# Patient Record
Sex: FEMALE | Race: WHITE | NOT HISPANIC OR LATINO | Employment: OTHER | URBAN - METROPOLITAN AREA
[De-identification: names, ages, dates, MRNs, and addresses within clinical notes are randomized per-mention and may not be internally consistent; named-entity substitution may affect disease eponyms.]

---

## 2019-10-14 LAB
BUPRENORPHINE GLUCURONIDE URINE: <5 NG/ML
BUPRENORPHINE URINE: <2 NG/ML
NALOXONE URINE: <100 NG/ML
NORBUPRENORPHINE GLUCURONIDE URINE: <5 NG/ML
NORBUPRENORPHINE, URINE: <2 NG/ML

## 2023-04-19 ENCOUNTER — OFFICE VISIT (OUTPATIENT)
Dept: PRIMARY CARE | Facility: CLINIC | Age: 73
End: 2023-04-19
Payer: MEDICARE

## 2023-04-19 VITALS
DIASTOLIC BLOOD PRESSURE: 58 MMHG | BODY MASS INDEX: 43.99 KG/M2 | WEIGHT: 233 LBS | HEIGHT: 61 IN | RESPIRATION RATE: 18 BRPM | OXYGEN SATURATION: 91 % | SYSTOLIC BLOOD PRESSURE: 118 MMHG | TEMPERATURE: 98.1 F | HEART RATE: 72 BPM

## 2023-04-19 DIAGNOSIS — J43.9 PULMONARY EMPHYSEMA, UNSPECIFIED EMPHYSEMA TYPE (MULTI): Primary | ICD-10-CM

## 2023-04-19 DIAGNOSIS — G47.00 INSOMNIA, UNSPECIFIED TYPE: ICD-10-CM

## 2023-04-19 DIAGNOSIS — S39.012A ACUTE MYOFASCIAL STRAIN OF LUMBAR REGION, INITIAL ENCOUNTER: ICD-10-CM

## 2023-04-19 PROBLEM — I25.84 CORONARY ARTERY CALCIFICATION: Status: ACTIVE | Noted: 2023-04-19

## 2023-04-19 PROBLEM — Z86.010 HISTORY OF COLON POLYPS: Status: ACTIVE | Noted: 2023-04-19

## 2023-04-19 PROBLEM — E11.40 DIABETIC NEUROPATHY (MULTI): Status: ACTIVE | Noted: 2023-04-19

## 2023-04-19 PROBLEM — C91.10 CLL (CHRONIC LYMPHOCYTIC LEUKEMIA) (MULTI): Status: ACTIVE | Noted: 2023-04-19

## 2023-04-19 PROBLEM — E66.01 MORBID OBESITY WITH BODY MASS INDEX (BMI) OF 40.0 OR HIGHER (MULTI): Status: ACTIVE | Noted: 2023-04-19

## 2023-04-19 PROBLEM — M53.9 MULTILEVEL DEGENERATIVE DISC DISEASE: Status: ACTIVE | Noted: 2023-04-19

## 2023-04-19 PROBLEM — R13.10 DYSPHAGIA: Status: ACTIVE | Noted: 2023-04-19

## 2023-04-19 PROBLEM — L29.9 PRURITUS: Status: ACTIVE | Noted: 2023-04-19

## 2023-04-19 PROBLEM — M79.602 PAIN OF LEFT UPPER EXTREMITY: Status: ACTIVE | Noted: 2023-04-19

## 2023-04-19 PROBLEM — M54.9 BACK PAIN: Status: ACTIVE | Noted: 2023-04-19

## 2023-04-19 PROBLEM — J44.9 COPD (CHRONIC OBSTRUCTIVE PULMONARY DISEASE) (MULTI): Status: ACTIVE | Noted: 2023-04-19

## 2023-04-19 PROBLEM — I51.9 LV DYSFUNCTION: Status: ACTIVE | Noted: 2023-04-19

## 2023-04-19 PROBLEM — M25.50 POLYARTHRALGIA: Status: ACTIVE | Noted: 2023-04-19

## 2023-04-19 PROBLEM — L98.9 LESION OF FINGER: Status: ACTIVE | Noted: 2023-04-19

## 2023-04-19 PROBLEM — R91.8 LUNG INFILTRATE: Status: ACTIVE | Noted: 2023-04-19

## 2023-04-19 PROBLEM — H40.219 ACUTE ANGLE-CLOSURE GLAUCOMA: Status: ACTIVE | Noted: 2023-04-19

## 2023-04-19 PROBLEM — F17.200 SMOKING: Status: ACTIVE | Noted: 2023-04-19

## 2023-04-19 PROBLEM — R07.81 RIB PAIN: Status: ACTIVE | Noted: 2023-04-19

## 2023-04-19 PROBLEM — M25.551 PAIN OF RIGHT HIP JOINT: Status: ACTIVE | Noted: 2023-04-19

## 2023-04-19 PROBLEM — M19.90 ARTHRITIS: Status: ACTIVE | Noted: 2023-04-19

## 2023-04-19 PROBLEM — K58.9 IRRITABLE BOWEL SYNDROME: Status: ACTIVE | Noted: 2023-04-19

## 2023-04-19 PROBLEM — J45.909 REACTIVE AIRWAY DISEASE (HHS-HCC): Status: ACTIVE | Noted: 2023-04-19

## 2023-04-19 PROBLEM — R10.9 ABDOMINAL PAIN: Status: ACTIVE | Noted: 2023-04-19

## 2023-04-19 PROBLEM — M05.9 RHEUMATOID ARTHRITIS WITH RHEUMATOID FACTOR (MULTI): Status: ACTIVE | Noted: 2023-04-19

## 2023-04-19 PROBLEM — M53.3 PAIN OF BOTH SACROILIAC JOINTS: Status: ACTIVE | Noted: 2023-04-19

## 2023-04-19 PROBLEM — R06.02 SOB (SHORTNESS OF BREATH): Status: ACTIVE | Noted: 2023-04-19

## 2023-04-19 PROBLEM — I10 BENIGN ESSENTIAL HYPERTENSION: Status: ACTIVE | Noted: 2023-04-19

## 2023-04-19 PROBLEM — I50.9 CHF (CONGESTIVE HEART FAILURE) (MULTI): Status: ACTIVE | Noted: 2023-04-19

## 2023-04-19 PROBLEM — M79.605 LEFT LEG PAIN: Status: ACTIVE | Noted: 2023-04-19

## 2023-04-19 PROBLEM — H10.30 CONJUNCTIVITIS, ACUTE: Status: ACTIVE | Noted: 2023-04-19

## 2023-04-19 PROBLEM — K64.9 HEMORRHOIDS: Status: ACTIVE | Noted: 2023-04-19

## 2023-04-19 PROBLEM — G89.29 CHRONIC NECK PAIN: Status: ACTIVE | Noted: 2023-04-19

## 2023-04-19 PROBLEM — R91.8 ABNORMAL CT LUNG SCREENING: Status: ACTIVE | Noted: 2023-04-19

## 2023-04-19 PROBLEM — K21.9 GERD WITHOUT ESOPHAGITIS: Status: ACTIVE | Noted: 2023-04-19

## 2023-04-19 PROBLEM — I25.10 CORONARY ARTERY CALCIFICATION: Status: ACTIVE | Noted: 2023-04-19

## 2023-04-19 PROBLEM — M17.0 OSTEOARTHRITIS OF BOTH KNEES: Status: ACTIVE | Noted: 2023-04-19

## 2023-04-19 PROBLEM — K63.5 COLON POLYPS: Status: ACTIVE | Noted: 2023-04-19

## 2023-04-19 PROBLEM — G25.81 RESTLESS LEGS SYNDROME: Status: ACTIVE | Noted: 2023-04-19

## 2023-04-19 PROBLEM — M54.2 CHRONIC NECK PAIN: Status: ACTIVE | Noted: 2023-04-19

## 2023-04-19 PROBLEM — M79.18 MYOFASCIAL PAIN SYNDROME: Status: ACTIVE | Noted: 2023-04-19

## 2023-04-19 PROBLEM — R91.8 LUNG NODULES: Status: ACTIVE | Noted: 2023-04-19

## 2023-04-19 PROBLEM — I48.91 ATRIAL FIBRILLATION BY ELECTROCARDIOGRAM (MULTI): Status: ACTIVE | Noted: 2023-04-19

## 2023-04-19 PROBLEM — R10.11 RUQ PAIN: Status: ACTIVE | Noted: 2023-04-19

## 2023-04-19 PROBLEM — R52 PAIN MANAGEMENT: Status: ACTIVE | Noted: 2023-04-19

## 2023-04-19 PROBLEM — I48.0 PAF (PAROXYSMAL ATRIAL FIBRILLATION) (MULTI): Status: ACTIVE | Noted: 2023-04-19

## 2023-04-19 PROBLEM — F32.A DEPRESSION: Status: ACTIVE | Noted: 2023-04-19

## 2023-04-19 PROBLEM — J02.9 PHARYNGITIS: Status: ACTIVE | Noted: 2023-04-19

## 2023-04-19 PROBLEM — R73.9 HYPERGLYCEMIA: Status: ACTIVE | Noted: 2023-04-19

## 2023-04-19 PROBLEM — K58.0 IRRITABLE BOWEL SYNDROME WITH DIARRHEA: Status: ACTIVE | Noted: 2023-04-19

## 2023-04-19 PROBLEM — G89.29 CHRONIC PAIN: Status: ACTIVE | Noted: 2023-04-19

## 2023-04-19 PROBLEM — M15.9 POLYARTICULAR OSTEOARTHRITIS: Status: ACTIVE | Noted: 2023-04-19

## 2023-04-19 PROBLEM — K86.81 EXOCRINE PANCREATIC INSUFFICIENCY (HHS-HCC): Status: ACTIVE | Noted: 2023-04-19

## 2023-04-19 PROBLEM — L23.9 ALLERGIC DERMATITIS: Status: ACTIVE | Noted: 2023-04-19

## 2023-04-19 PROBLEM — M16.11 OSTEOARTHRITIS OF RIGHT HIP: Status: ACTIVE | Noted: 2023-04-19

## 2023-04-19 PROBLEM — E78.5 HYPERLIPIDEMIA: Status: ACTIVE | Noted: 2023-04-19

## 2023-04-19 PROBLEM — H92.03 OTALGIA OF BOTH EARS: Status: ACTIVE | Noted: 2023-04-19

## 2023-04-19 PROBLEM — S16.1XXA CERVICAL MYOFASCIAL STRAIN: Status: ACTIVE | Noted: 2023-04-19

## 2023-04-19 PROBLEM — I50.32 CHRONIC DIASTOLIC HEART FAILURE (MULTI): Status: ACTIVE | Noted: 2023-04-19

## 2023-04-19 PROBLEM — K59.04 CHRONIC IDIOPATHIC CONSTIPATION: Status: ACTIVE | Noted: 2023-04-19

## 2023-04-19 PROBLEM — K04.7 DENTAL INFECTION: Status: ACTIVE | Noted: 2023-04-19

## 2023-04-19 PROBLEM — G47.30 BREATHING-RELATED SLEEP DISORDER: Status: ACTIVE | Noted: 2023-04-19

## 2023-04-19 PROBLEM — Z86.0100 HISTORY OF COLON POLYPS: Status: ACTIVE | Noted: 2023-04-19

## 2023-04-19 PROBLEM — M43.10 ACQUIRED SPONDYLOLISTHESIS: Status: ACTIVE | Noted: 2023-04-19

## 2023-04-19 PROBLEM — L98.9 CHANGING SKIN LESION: Status: ACTIVE | Noted: 2023-04-19

## 2023-04-19 PROBLEM — I48.3 TYPICAL ATRIAL FLUTTER (MULTI): Status: ACTIVE | Noted: 2023-04-19

## 2023-04-19 PROCEDURE — 1036F TOBACCO NON-USER: CPT | Performed by: FAMILY MEDICINE

## 2023-04-19 PROCEDURE — 3078F DIAST BP <80 MM HG: CPT | Performed by: FAMILY MEDICINE

## 2023-04-19 PROCEDURE — 1159F MED LIST DOCD IN RCRD: CPT | Performed by: FAMILY MEDICINE

## 2023-04-19 PROCEDURE — 99214 OFFICE O/P EST MOD 30 MIN: CPT | Performed by: FAMILY MEDICINE

## 2023-04-19 PROCEDURE — 3074F SYST BP LT 130 MM HG: CPT | Performed by: FAMILY MEDICINE

## 2023-04-19 RX ORDER — METHOTREXATE 2.5 MG/1
6 TABLET ORAL
COMMUNITY
Start: 2019-09-26

## 2023-04-19 RX ORDER — NALOXONE HYDROCHLORIDE 4 MG/.1ML
4 SPRAY NASAL AS NEEDED
COMMUNITY
Start: 2019-02-14 | End: 2024-05-03 | Stop reason: WASHOUT

## 2023-04-19 RX ORDER — GUAIFENESIN 600 MG/1
1200 TABLET, EXTENDED RELEASE ORAL DAILY
COMMUNITY
End: 2024-02-19 | Stop reason: ALTCHOICE

## 2023-04-19 RX ORDER — FOLIC ACID 1 MG/1
1 TABLET ORAL DAILY
COMMUNITY
Start: 2019-09-26

## 2023-04-19 RX ORDER — PREDNISONE 20 MG/1
TABLET ORAL
Qty: 18 TABLET | Refills: 0 | Status: SHIPPED | OUTPATIENT
Start: 2023-04-19 | End: 2023-06-27 | Stop reason: ALTCHOICE

## 2023-04-19 RX ORDER — MONTELUKAST SODIUM 10 MG/1
1 TABLET ORAL DAILY
COMMUNITY
Start: 2016-11-07 | End: 2024-01-30 | Stop reason: ALTCHOICE

## 2023-04-19 RX ORDER — ROFLUMILAST 500 UG/1
1 TABLET ORAL DAILY
COMMUNITY
Start: 2018-05-22 | End: 2023-06-26 | Stop reason: SDUPTHER

## 2023-04-19 RX ORDER — PROPAFENONE HYDROCHLORIDE 225 MG/1
225 TABLET, COATED ORAL EVERY 8 HOURS
COMMUNITY
Start: 2020-02-17 | End: 2024-02-27 | Stop reason: SDUPTHER

## 2023-04-19 RX ORDER — CLONIDINE HYDROCHLORIDE 0.1 MG/1
1 TABLET ORAL EVERY 8 HOURS
COMMUNITY
Start: 2016-06-20 | End: 2023-06-26 | Stop reason: SDUPTHER

## 2023-04-19 RX ORDER — HYDROXYZINE HYDROCHLORIDE 50 MG/1
50-100 TABLET, FILM COATED ORAL 2 TIMES DAILY PRN
Qty: 30 TABLET | Refills: 0 | Status: SHIPPED | OUTPATIENT
Start: 2023-04-19 | End: 2023-05-24

## 2023-04-19 RX ORDER — BUDESONIDE, GLYCOPYRROLATE, AND FORMOTEROL FUMARATE 160; 9; 4.8 UG/1; UG/1; UG/1
2 AEROSOL, METERED RESPIRATORY (INHALATION) 2 TIMES DAILY
COMMUNITY
Start: 2023-02-17 | End: 2024-01-30 | Stop reason: ALTCHOICE

## 2023-04-19 RX ORDER — SULFASALAZINE 500 MG/1
500 TABLET ORAL 2 TIMES DAILY
COMMUNITY
End: 2024-01-22 | Stop reason: SDUPTHER

## 2023-04-19 RX ORDER — ATORVASTATIN CALCIUM 20 MG/1
1 TABLET, FILM COATED ORAL NIGHTLY
COMMUNITY
Start: 2022-09-15 | End: 2024-02-06 | Stop reason: SDUPTHER

## 2023-04-19 RX ORDER — LINACLOTIDE 72 UG/1
1 CAPSULE, GELATIN COATED ORAL DAILY
COMMUNITY
Start: 2022-03-09

## 2023-04-19 RX ORDER — TAPENTADOL HYDROCHLORIDE 75 MG/1
75 TABLET, FILM COATED ORAL
COMMUNITY
Start: 2023-04-11 | End: 2023-06-27

## 2023-04-19 RX ORDER — CREAM BASE NO.31
1 CREAM (GRAM) MISCELLANEOUS SEE ADMIN INSTRUCTIONS
COMMUNITY
End: 2024-01-30 | Stop reason: ALTCHOICE

## 2023-04-19 RX ORDER — ALBUTEROL SULFATE 90 UG/1
2 AEROSOL, METERED RESPIRATORY (INHALATION)
COMMUNITY
Start: 2016-09-05

## 2023-04-19 RX ORDER — GABAPENTIN 300 MG/1
1 CAPSULE ORAL 3 TIMES DAILY
COMMUNITY
End: 2023-11-13 | Stop reason: SDUPTHER

## 2023-04-19 RX ORDER — LORATADINE 10 MG/1
CAPSULE, LIQUID FILLED ORAL
COMMUNITY
End: 2023-12-11 | Stop reason: WASHOUT

## 2023-04-19 RX ORDER — FAMOTIDINE 40 MG/1
40 TABLET, FILM COATED ORAL DAILY
COMMUNITY
End: 2023-06-19

## 2023-04-19 RX ORDER — CIPROFLOXACIN HYDROCHLORIDE 3 MG/ML
SOLUTION/ DROPS OPHTHALMIC
COMMUNITY
Start: 2022-04-25 | End: 2023-06-27 | Stop reason: ALTCHOICE

## 2023-04-19 RX ORDER — DEXLANSOPRAZOLE 60 MG/1
20 CAPSULE, DELAYED RELEASE ORAL
COMMUNITY
Start: 2022-04-28 | End: 2023-05-01 | Stop reason: SDUPTHER

## 2023-04-19 RX ORDER — IPRATROPIUM BROMIDE AND ALBUTEROL SULFATE 2.5; .5 MG/3ML; MG/3ML
3 SOLUTION RESPIRATORY (INHALATION) EVERY 4 HOURS PRN
COMMUNITY
Start: 2019-12-12 | End: 2024-02-06 | Stop reason: WASHOUT

## 2023-04-19 RX ORDER — MUPIROCIN 20 MG/G
OINTMENT TOPICAL
COMMUNITY
Start: 2022-06-22 | End: 2024-02-19 | Stop reason: ALTCHOICE

## 2023-04-19 ASSESSMENT — PATIENT HEALTH QUESTIONNAIRE - PHQ9
2. FEELING DOWN, DEPRESSED OR HOPELESS: NOT AT ALL
1. LITTLE INTEREST OR PLEASURE IN DOING THINGS: NOT AT ALL
SUM OF ALL RESPONSES TO PHQ9 QUESTIONS 1 AND 2: 0

## 2023-04-19 ASSESSMENT — ENCOUNTER SYMPTOMS
LOSS OF SENSATION IN FEET: 1
OCCASIONAL FEELINGS OF UNSTEADINESS: 1
DEPRESSION: 0

## 2023-04-19 NOTE — PROGRESS NOTES
"Subjective   Patient ID: Alysia Magdaleno is a 72 y.o. female who presents for Spasms (Back muscle spasm last week ), Insomnia, and Shortness of Breath.    Difficulty breathing- following with Dr. Solano for pulmonary nodules with PET scan completed 2/14 with findings suggested of low grade neoplasm vs inflammatory/infectious processes, has a CT ordered for follow up 5/2023.    Has difficulty sleeping, has been using OTC sleep aid. Uses oxygen 2 L at night only.    Back pain that started last week after doing a lot of lifting activities is improving but still having pain with increased activity         Review of Systems   All other systems reviewed and are negative.      Objective   /58   Pulse 72   Temp 36.7 °C (98.1 °F)   Resp 18   Ht 1.549 m (5' 1\")   Wt 106 kg (233 lb)   SpO2 91%   BMI 44.02 kg/m²     Physical Exam  Constitutional:       Appearance: Normal appearance. She is morbidly obese.   HENT:      Head: Normocephalic and atraumatic.   Cardiovascular:      Rate and Rhythm: Normal rate and regular rhythm.      Heart sounds: No murmur heard.     No gallop.   Pulmonary:      Effort: Pulmonary effort is normal. No respiratory distress.      Breath sounds: Normal breath sounds.   Abdominal:      General: Bowel sounds are normal. There is no distension.      Tenderness: There is no abdominal tenderness.   Musculoskeletal:      Lumbar back: Tenderness present. Decreased range of motion. Negative right straight leg raise test and negative left straight leg raise test.   Skin:     General: Skin is warm and dry.      Findings: No lesion or rash.   Neurological:      General: No focal deficit present.      Mental Status: She is alert and oriented to person, place, and time. Mental status is at baseline.   Psychiatric:         Mood and Affect: Mood normal.         Behavior: Behavior normal.         Assessment/Plan   Problem List Items Addressed This Visit          Nervous    Insomnia    Relevant Medications "    hydrOXYzine HCL (Atarax) 50 mg tablet       Respiratory    COPD (chronic obstructive pulmonary disease) (CMS/HCC) - Primary    Relevant Medications    predniSONE (Deltasone) 20 mg tablet     Other Visit Diagnoses       Acute myofascial strain of lumbar region, initial encounter

## 2023-05-01 DIAGNOSIS — K21.9 GERD WITHOUT ESOPHAGITIS: Primary | ICD-10-CM

## 2023-05-01 RX ORDER — DEXLANSOPRAZOLE 60 MG/1
60 CAPSULE, DELAYED RELEASE ORAL
Qty: 90 CAPSULE | Refills: 1 | Status: SHIPPED | OUTPATIENT
Start: 2023-05-01 | End: 2023-08-09 | Stop reason: SDUPTHER

## 2023-05-24 ENCOUNTER — OFFICE VISIT (OUTPATIENT)
Dept: PRIMARY CARE | Facility: CLINIC | Age: 73
End: 2023-05-24
Payer: MEDICARE

## 2023-05-24 VITALS
BODY MASS INDEX: 43.99 KG/M2 | HEART RATE: 74 BPM | OXYGEN SATURATION: 91 % | RESPIRATION RATE: 18 BRPM | HEIGHT: 61 IN | SYSTOLIC BLOOD PRESSURE: 120 MMHG | WEIGHT: 233 LBS | TEMPERATURE: 98.1 F | DIASTOLIC BLOOD PRESSURE: 80 MMHG

## 2023-05-24 DIAGNOSIS — R13.10 DYSPHAGIA, UNSPECIFIED: ICD-10-CM

## 2023-05-24 DIAGNOSIS — G47.00 INSOMNIA, UNSPECIFIED TYPE: ICD-10-CM

## 2023-05-24 DIAGNOSIS — R13.10 DYSPHAGIA, UNSPECIFIED TYPE: Primary | ICD-10-CM

## 2023-05-24 PROCEDURE — 3074F SYST BP LT 130 MM HG: CPT | Performed by: FAMILY MEDICINE

## 2023-05-24 PROCEDURE — 99214 OFFICE O/P EST MOD 30 MIN: CPT | Performed by: FAMILY MEDICINE

## 2023-05-24 PROCEDURE — 1160F RVW MEDS BY RX/DR IN RCRD: CPT | Performed by: FAMILY MEDICINE

## 2023-05-24 PROCEDURE — 1036F TOBACCO NON-USER: CPT | Performed by: FAMILY MEDICINE

## 2023-05-24 PROCEDURE — 3079F DIAST BP 80-89 MM HG: CPT | Performed by: FAMILY MEDICINE

## 2023-05-24 PROCEDURE — 1159F MED LIST DOCD IN RCRD: CPT | Performed by: FAMILY MEDICINE

## 2023-05-24 RX ORDER — TRAZODONE HYDROCHLORIDE 50 MG/1
50 TABLET ORAL NIGHTLY PRN
Qty: 30 TABLET | Refills: 0 | Status: SHIPPED | OUTPATIENT
Start: 2023-05-24 | End: 2023-06-21 | Stop reason: SDUPTHER

## 2023-05-24 ASSESSMENT — ENCOUNTER SYMPTOMS
DEPRESSION: 0
LOSS OF SENSATION IN FEET: 0
OCCASIONAL FEELINGS OF UNSTEADINESS: 0

## 2023-05-24 ASSESSMENT — PATIENT HEALTH QUESTIONNAIRE - PHQ9
SUM OF ALL RESPONSES TO PHQ9 QUESTIONS 1 AND 2: 0
1. LITTLE INTEREST OR PLEASURE IN DOING THINGS: NOT AT ALL
2. FEELING DOWN, DEPRESSED OR HOPELESS: NOT AT ALL

## 2023-05-24 NOTE — PROGRESS NOTES
"Subjective   Patient ID: Alysia Magdaleno is a 72 y.o. female who presents for Follow-up (Patient present today for complaint of trouble swallowing. ).    Presents with multiple concerns    Has been having trouble swallowing, reports some coughing after eating and drinking. Denies fever, chills, chest pain.    Has been having difficulty sleeping, has taken OTC sleep aides that help some. Declines sleep study today. Had tried hydroxyzine 100 mg which did not help any more then the OTC medications.          Review of Systems   All other systems reviewed and are negative.      Objective   /80   Pulse 74   Temp 36.7 °C (98.1 °F)   Resp 18   Ht 1.549 m (5' 1\")   Wt 106 kg (233 lb)   SpO2 91%   BMI 44.02 kg/m²     Physical Exam  Constitutional:       Appearance: Normal appearance.   HENT:      Head: Normocephalic and atraumatic.   Neck:      Thyroid: No thyroid mass, thyromegaly or thyroid tenderness.      Trachea: Trachea normal.   Cardiovascular:      Rate and Rhythm: Normal rate and regular rhythm.      Heart sounds: No murmur heard.     No gallop.   Pulmonary:      Effort: Pulmonary effort is normal. No respiratory distress.      Breath sounds: Normal breath sounds.   Abdominal:      General: Bowel sounds are normal. There is no distension.      Tenderness: There is no abdominal tenderness.   Musculoskeletal:         General: Normal range of motion.      Cervical back: Full passive range of motion without pain.   Skin:     General: Skin is warm and dry.      Findings: No lesion or rash.   Neurological:      General: No focal deficit present.      Mental Status: She is alert and oriented to person, place, and time. Mental status is at baseline.   Psychiatric:         Mood and Affect: Mood normal.         Behavior: Behavior normal.         Assessment/Plan   Problem List Items Addressed This Visit          Nervous    Insomnia    Relevant Medications    traZODone (Desyrel) 50 mg tablet       Digestive    " Dysphagia - Primary    Relevant Orders    FL esophagus barium swallow

## 2023-06-05 ENCOUNTER — TELEPHONE (OUTPATIENT)
Dept: PRIMARY CARE | Facility: CLINIC | Age: 73
End: 2023-06-05
Payer: MEDICARE

## 2023-06-15 ENCOUNTER — OFFICE VISIT (OUTPATIENT)
Dept: PRIMARY CARE | Facility: CLINIC | Age: 73
End: 2023-06-15
Payer: MEDICARE

## 2023-06-15 VITALS
BODY MASS INDEX: 44.12 KG/M2 | SYSTOLIC BLOOD PRESSURE: 138 MMHG | DIASTOLIC BLOOD PRESSURE: 82 MMHG | WEIGHT: 233.5 LBS | HEART RATE: 78 BPM

## 2023-06-15 DIAGNOSIS — Z13.220 LIPID SCREENING: ICD-10-CM

## 2023-06-15 DIAGNOSIS — R06.02 SHORTNESS OF BREATH: ICD-10-CM

## 2023-06-15 DIAGNOSIS — D50.9 IRON DEFICIENCY ANEMIA, UNSPECIFIED IRON DEFICIENCY ANEMIA TYPE: ICD-10-CM

## 2023-06-15 DIAGNOSIS — L03.116 CELLULITIS OF LEFT LOWER EXTREMITY: ICD-10-CM

## 2023-06-15 DIAGNOSIS — R06.02 SOB (SHORTNESS OF BREATH): ICD-10-CM

## 2023-06-15 DIAGNOSIS — R60.0 PEDAL EDEMA: Primary | ICD-10-CM

## 2023-06-15 PROCEDURE — 99214 OFFICE O/P EST MOD 30 MIN: CPT | Performed by: FAMILY MEDICINE

## 2023-06-15 PROCEDURE — 3079F DIAST BP 80-89 MM HG: CPT | Performed by: FAMILY MEDICINE

## 2023-06-15 PROCEDURE — 1159F MED LIST DOCD IN RCRD: CPT | Performed by: FAMILY MEDICINE

## 2023-06-15 PROCEDURE — 1160F RVW MEDS BY RX/DR IN RCRD: CPT | Performed by: FAMILY MEDICINE

## 2023-06-15 PROCEDURE — 3075F SYST BP GE 130 - 139MM HG: CPT | Performed by: FAMILY MEDICINE

## 2023-06-15 PROCEDURE — 1036F TOBACCO NON-USER: CPT | Performed by: FAMILY MEDICINE

## 2023-06-15 RX ORDER — FUROSEMIDE 20 MG/1
20 TABLET ORAL DAILY
Qty: 7 TABLET | Refills: 0 | Status: SHIPPED | OUTPATIENT
Start: 2023-06-15 | End: 2023-06-27 | Stop reason: ALTCHOICE

## 2023-06-15 RX ORDER — CEPHALEXIN 250 MG/1
250 CAPSULE ORAL 4 TIMES DAILY
Qty: 40 CAPSULE | Refills: 0 | Status: SHIPPED | OUTPATIENT
Start: 2023-06-15 | End: 2023-06-27 | Stop reason: ALTCHOICE

## 2023-06-15 ASSESSMENT — ENCOUNTER SYMPTOMS
NAUSEA: 0
HEMATOLOGIC/LYMPHATIC NEGATIVE: 1
EYES NEGATIVE: 1
NEUROLOGICAL NEGATIVE: 1
SHORTNESS OF BREATH: 1
DIAPHORESIS: 0
ENDOCRINE NEGATIVE: 1
VOMITING: 0
PALPITATIONS: 0
COLOR CHANGE: 1
ALLERGIC/IMMUNOLOGIC NEGATIVE: 1
MUSCULOSKELETAL NEGATIVE: 1
GASTROINTESTINAL NEGATIVE: 1
PSYCHIATRIC NEGATIVE: 1
ABDOMINAL PAIN: 0
CHILLS: 0
ACTIVITY CHANGE: 1
FATIGUE: 0
FEVER: 0
APPETITE CHANGE: 0
UNEXPECTED WEIGHT CHANGE: 0

## 2023-06-15 NOTE — PROGRESS NOTES
Subjective   Patient ID: Alysia Magdaleno is a 72 y.o. female who presents for Leg Swelling (Right leg redness ).    Patient presents for bilateral leg swelling and left leg redness that started 2 weeks ago with increased shortness of breath on exertion. She denies eating any added salt. She said her leg swelling has made it difficult for her to walk far. She said that she was on a diuretic at one time. She says that she cannot wear compression socks because they hurt and she does elevate her feet when she is able to. She follows with  Dr. Livingston for a fib and will see on 6/21. Also follows with Dr. Rose for CHF and was last seen 4/11/23. Is also following with pulmonology for suspicious lung nodule and is awaiting biopsy.     Nanette Cm P student acting as scribe for Sadie Rao CNP           Review of Systems   Constitutional:  Positive for activity change. Negative for appetite change, chills, diaphoresis, fatigue, fever and unexpected weight change.   HENT: Negative.     Eyes: Negative.    Respiratory:  Positive for shortness of breath.    Cardiovascular:  Positive for leg swelling. Negative for chest pain and palpitations.   Gastrointestinal: Negative.  Negative for abdominal pain, nausea and vomiting.   Endocrine: Negative.    Genitourinary: Negative.    Musculoskeletal: Negative.    Skin:  Positive for color change.   Allergic/Immunologic: Negative.    Neurological: Negative.    Hematological: Negative.    Psychiatric/Behavioral: Negative.         Objective   /82   Pulse 78   Wt 106 kg (233 lb 8 oz)   BMI 44.12 kg/m²     Physical Exam  Constitutional:       Appearance: She is obese.   HENT:      Head: Normocephalic and atraumatic.      Nose: Nose normal.   Cardiovascular:      Rate and Rhythm: Normal rate and regular rhythm.      Pulses: Normal pulses.      Heart sounds: Normal heart sounds.   Pulmonary:      Effort: Accessory muscle usage present.      Breath sounds: Normal  breath sounds.   Abdominal:      General: Bowel sounds are normal.   Musculoskeletal:         General: Tenderness present.      Cervical back: Normal range of motion and neck supple.      Right lower leg: Edema present.      Left lower leg: Edema present.   Skin:     Capillary Refill: Capillary refill takes less than 2 seconds.      Findings: Erythema present.      Comments: Left leg   Neurological:      General: No focal deficit present.      Mental Status: She is alert and oriented to person, place, and time.   Psychiatric:         Mood and Affect: Mood normal.         Behavior: Behavior normal.         Thought Content: Thought content normal.         Judgment: Judgment normal.         Assessment/Plan   Problem List Items Addressed This Visit       SOB (shortness of breath)    Relevant Orders    Comprehensive Metabolic Panel     Other Visit Diagnoses       Pedal edema    -  Primary    Relevant Medications    furosemide (Lasix) 20 mg tablet    Other Relevant Orders    B-type natriuretic peptide    TSH with reflex to Free T4 if abnormal    Shortness of breath        Relevant Orders    B-type natriuretic peptide    Lipid screening        Relevant Orders    Lipid Panel    Iron deficiency anemia, unspecified iron deficiency anemia type        Relevant Orders    CBC and Auto Differential    Iron and TIBC    Vitamin B12    Cellulitis of left lower extremity        Relevant Medications    cephalexin (Keflex) 250 mg capsule

## 2023-06-15 NOTE — PATIENT INSTRUCTIONS
I ordering lab work that I would like you to get done at your earliest convenience.    I am starting you on an antibiotic for cellulitis and a water pill daily for 7 days to help with the swelling in your legs.     Make sure to elevate your legs above the level of your heart for 20 minutes 3-4 times a day. I recommend using compression stockings applying first thing in the morning and taking off before bed. I want you to call me with an update in 1 week or sooner with any worsening symptoms.

## 2023-06-19 ENCOUNTER — TELEPHONE (OUTPATIENT)
Dept: PRIMARY CARE | Facility: CLINIC | Age: 73
End: 2023-06-19

## 2023-06-19 ENCOUNTER — LAB (OUTPATIENT)
Dept: LAB | Facility: LAB | Age: 73
End: 2023-06-19
Payer: MEDICARE

## 2023-06-19 DIAGNOSIS — R60.0 PEDAL EDEMA: ICD-10-CM

## 2023-06-19 DIAGNOSIS — I99.8 VASCULAR INSUFFICIENCY: Primary | ICD-10-CM

## 2023-06-19 DIAGNOSIS — R06.02 SHORTNESS OF BREATH: ICD-10-CM

## 2023-06-19 DIAGNOSIS — Z13.220 LIPID SCREENING: ICD-10-CM

## 2023-06-19 DIAGNOSIS — R06.02 SOB (SHORTNESS OF BREATH): ICD-10-CM

## 2023-06-19 DIAGNOSIS — D50.9 IRON DEFICIENCY ANEMIA, UNSPECIFIED IRON DEFICIENCY ANEMIA TYPE: ICD-10-CM

## 2023-06-19 DIAGNOSIS — D64.9 ANEMIA, UNSPECIFIED TYPE: ICD-10-CM

## 2023-06-19 LAB
ALANINE AMINOTRANSFERASE (SGPT) (U/L) IN SER/PLAS: 11 U/L (ref 7–45)
ALBUMIN (G/DL) IN SER/PLAS: 4.1 G/DL (ref 3.4–5)
ALKALINE PHOSPHATASE (U/L) IN SER/PLAS: 71 U/L (ref 33–136)
ANION GAP IN SER/PLAS: 12 MMOL/L (ref 10–20)
ASPARTATE AMINOTRANSFERASE (SGOT) (U/L) IN SER/PLAS: 14 U/L (ref 9–39)
BASOPHILS (10*3/UL) IN BLOOD BY MANUAL COUNT - WAM: 0 X10E9/L (ref 0–0.1)
BASOPHILS/100 LEUKOCYTES IN BLOOD BY MANUAL COUNT - WAM: 0 %
BILIRUBIN TOTAL (MG/DL) IN SER/PLAS: 0.3 MG/DL (ref 0–1.2)
CALCIUM (MG/DL) IN SER/PLAS: 9.4 MG/DL (ref 8.6–10.3)
CARBON DIOXIDE, TOTAL (MMOL/L) IN SER/PLAS: 31 MMOL/L (ref 21–32)
CHLORIDE (MMOL/L) IN SER/PLAS: 99 MMOL/L (ref 98–107)
CHOLESTEROL (MG/DL) IN SER/PLAS: 151 MG/DL (ref 0–199)
CHOLESTEROL IN HDL (MG/DL) IN SER/PLAS: 71.7 MG/DL
CHOLESTEROL/HDL RATIO: 2.1
COBALAMIN (VITAMIN B12) (PG/ML) IN SER/PLAS: 499 PG/ML (ref 211–911)
CREATININE (MG/DL) IN SER/PLAS: 0.95 MG/DL (ref 0.5–1.05)
EOSINOPHILS (10*3/UL) IN BLOOD BY MANUAL COUNT - WAM: 0.53 X10E9/L (ref 0–0.7)
EOSINOPHILS/100 LEUKOCYTES IN BLOOD BY MANUAL COUNT - WAM: 2 % (ref 0–6)
ERYTHROCYTE DISTRIBUTION WIDTH (RATIO) BY AUTOMATED COUNT: 17 % (ref 11.5–14.5)
ERYTHROCYTE MEAN CORPUSCULAR HEMOGLOBIN CONCENTRATION (G/DL) BY AUTOMATED: 28.8 G/DL (ref 32–36)
ERYTHROCYTE MEAN CORPUSCULAR VOLUME (FL) BY AUTOMATED COUNT: 101 FL (ref 80–100)
ERYTHROCYTES (10*6/UL) IN BLOOD BY AUTOMATED COUNT: 3.59 X10E12/L (ref 4–5.2)
GFR FEMALE: 64 ML/MIN/1.73M2
GLUCOSE (MG/DL) IN SER/PLAS: 136 MG/DL (ref 74–99)
HEMATOCRIT (%) IN BLOOD BY AUTOMATED COUNT: 36.4 % (ref 36–46)
HEMOGLOBIN (G/DL) IN BLOOD: 10.5 G/DL (ref 12–16)
IMMATURE GRANULOCYTES/100 LEUKOCYTES IN BLOOD BY AUTOMATED COUNT: 0.2 % (ref 0–0.9)
IRON (UG/DL) IN SER/PLAS: 38 UG/DL (ref 35–150)
IRON BINDING CAPACITY (UG/DL) IN SER/PLAS: 444 UG/DL (ref 240–445)
IRON SATURATION (%) IN SER/PLAS: 9 % (ref 25–45)
LDL: 57 MG/DL (ref 0–99)
LEUKOCYTES (10*3/UL) IN BLOOD BY AUTOMATED COUNT: 26.5 X10E9/L (ref 4.4–11.3)
LYMPHOCYTES (10*3/UL) IN BLOOD BY MANUAL COUNT - WAM: 23.06 X10E9/L (ref 1.2–4.8)
LYMPHOCYTES/100 LEUKOCYTES IN BLOOD BY MANUAL COUNT - WAM: 87 % (ref 13–44)
MANUAL DIFFERENTIAL Y/N: ABNORMAL
MONOCYTES (10*3/UL) IN BLOOD BY MANUAL COUNT - WAM: 0 X10E9/L (ref 0.1–1)
MONOCYTES/100 LEUKOCYTES IN BLOOD BY MANUAL COUNT - WAM: 0 % (ref 2–10)
NATRIURETIC PEPTIDE B (PG/ML) IN SER/PLAS: 66 PG/ML (ref 0–99)
NEUTROPHILS (SEGS+BANDS) (10*3/UL) MANUAL COUNT - WAM: 2.92 X10E9/L (ref 1.2–7.7)
PLATELETS (10*3/UL) IN BLOOD AUTOMATED COUNT: 275 X10E9/L (ref 150–450)
POTASSIUM (MMOL/L) IN SER/PLAS: 4.2 MMOL/L (ref 3.5–5.3)
PROTEIN TOTAL: 6.5 G/DL (ref 6.4–8.2)
RBC MORPHOLOGY IN BLOOD: NORMAL
SEGMENTED NEUTROPHILS (10*3/UL) BLOOD MANUAL - WAM: 2.92 X10E9/L (ref 1.2–7)
SEGMENTED NEUTROPHILS/100 LEUKOCYTES BY MANUAL COUNT -: 11 % (ref 40–80)
SODIUM (MMOL/L) IN SER/PLAS: 138 MMOL/L (ref 136–145)
THYROTROPIN (MIU/L) IN SER/PLAS BY DETECTION LIMIT <= 0.05 MIU/L: 5.14 MIU/L (ref 0.44–3.98)
THYROXINE (T4) FREE (NG/DL) IN SER/PLAS: 0.9 NG/DL (ref 0.61–1.12)
TRIGLYCERIDE (MG/DL) IN SER/PLAS: 113 MG/DL (ref 0–149)
UREA NITROGEN (MG/DL) IN SER/PLAS: 14 MG/DL (ref 6–23)
VLDL: 23 MG/DL (ref 0–40)

## 2023-06-19 PROCEDURE — 83880 ASSAY OF NATRIURETIC PEPTIDE: CPT

## 2023-06-19 PROCEDURE — 36415 COLL VENOUS BLD VENIPUNCTURE: CPT

## 2023-06-19 PROCEDURE — 84443 ASSAY THYROID STIM HORMONE: CPT

## 2023-06-19 PROCEDURE — 83550 IRON BINDING TEST: CPT

## 2023-06-19 PROCEDURE — 80061 LIPID PANEL: CPT

## 2023-06-19 PROCEDURE — 84439 ASSAY OF FREE THYROXINE: CPT

## 2023-06-19 PROCEDURE — 82607 VITAMIN B-12: CPT

## 2023-06-19 PROCEDURE — 83540 ASSAY OF IRON: CPT

## 2023-06-19 PROCEDURE — 80053 COMPREHEN METABOLIC PANEL: CPT

## 2023-06-19 PROCEDURE — 85025 COMPLETE CBC W/AUTO DIFF WBC: CPT

## 2023-06-20 NOTE — RESULT ENCOUNTER NOTE
Anemia a bit worse, ordering fecal occult to rule out GI source of bleeding. BNP normals level, will order ultrasound of legs for vascular etiology of pedal edema

## 2023-06-21 DIAGNOSIS — G47.00 INSOMNIA, UNSPECIFIED TYPE: ICD-10-CM

## 2023-06-21 RX ORDER — TRAZODONE HYDROCHLORIDE 50 MG/1
50 TABLET ORAL NIGHTLY PRN
Qty: 90 TABLET | Refills: 3 | Status: SHIPPED | OUTPATIENT
Start: 2023-06-21 | End: 2024-06-03

## 2023-06-26 ENCOUNTER — TELEPHONE (OUTPATIENT)
Dept: PRIMARY CARE | Facility: CLINIC | Age: 73
End: 2023-06-26
Payer: MEDICARE

## 2023-06-26 DIAGNOSIS — J44.9 CHRONIC OBSTRUCTIVE PULMONARY DISEASE, UNSPECIFIED COPD TYPE (MULTI): ICD-10-CM

## 2023-06-26 DIAGNOSIS — I50.32 CHRONIC DIASTOLIC HEART FAILURE (MULTI): Primary | ICD-10-CM

## 2023-06-26 DIAGNOSIS — I10 BENIGN ESSENTIAL HYPERTENSION: ICD-10-CM

## 2023-06-26 RX ORDER — ROFLUMILAST 500 UG/1
500 TABLET ORAL DAILY
Qty: 90 TABLET | Refills: 3 | Status: SHIPPED | OUTPATIENT
Start: 2023-06-26 | End: 2023-12-11 | Stop reason: SDUPTHER

## 2023-06-26 RX ORDER — CLONIDINE HYDROCHLORIDE 0.1 MG/1
0.1 TABLET ORAL EVERY 8 HOURS
Qty: 270 TABLET | Refills: 3 | Status: SHIPPED | OUTPATIENT
Start: 2023-06-26 | End: 2024-06-25

## 2023-06-27 ENCOUNTER — OFFICE VISIT (OUTPATIENT)
Dept: PRIMARY CARE | Facility: CLINIC | Age: 73
End: 2023-06-27
Payer: MEDICARE

## 2023-06-27 ENCOUNTER — TELEPHONE (OUTPATIENT)
Dept: PRIMARY CARE | Facility: CLINIC | Age: 73
End: 2023-06-27

## 2023-06-27 VITALS
HEART RATE: 74 BPM | HEIGHT: 60 IN | DIASTOLIC BLOOD PRESSURE: 60 MMHG | BODY MASS INDEX: 44.76 KG/M2 | TEMPERATURE: 98 F | RESPIRATION RATE: 20 BRPM | WEIGHT: 228 LBS | SYSTOLIC BLOOD PRESSURE: 124 MMHG | OXYGEN SATURATION: 92 %

## 2023-06-27 DIAGNOSIS — E66.01 MORBID OBESITY WITH BODY MASS INDEX (BMI) OF 40.0 OR HIGHER (MULTI): ICD-10-CM

## 2023-06-27 DIAGNOSIS — F33.9 DEPRESSION, RECURRENT (CMS-HCC): ICD-10-CM

## 2023-06-27 DIAGNOSIS — E78.2 MIXED HYPERLIPIDEMIA: ICD-10-CM

## 2023-06-27 DIAGNOSIS — R91.8 ABNORMAL CT LUNG SCREENING: ICD-10-CM

## 2023-06-27 DIAGNOSIS — C91.10 CLL (CHRONIC LYMPHOCYTIC LEUKEMIA) (MULTI): ICD-10-CM

## 2023-06-27 DIAGNOSIS — I50.9 CONGESTIVE HEART FAILURE, UNSPECIFIED HF CHRONICITY, UNSPECIFIED HEART FAILURE TYPE (MULTI): ICD-10-CM

## 2023-06-27 DIAGNOSIS — M05.9 RHEUMATOID ARTHRITIS WITH POSITIVE RHEUMATOID FACTOR, INVOLVING UNSPECIFIED SITE (MULTI): ICD-10-CM

## 2023-06-27 DIAGNOSIS — L81.9 DISCOLORATION OF SKIN OF FOOT: ICD-10-CM

## 2023-06-27 DIAGNOSIS — R09.89 DECREASED PULSES IN FEET: ICD-10-CM

## 2023-06-27 DIAGNOSIS — R60.0 BILATERAL LOWER EXTREMITY EDEMA: ICD-10-CM

## 2023-06-27 DIAGNOSIS — R73.9 HYPERGLYCEMIA: ICD-10-CM

## 2023-06-27 DIAGNOSIS — I48.91 ATRIAL FIBRILLATION BY ELECTROCARDIOGRAM (MULTI): Primary | ICD-10-CM

## 2023-06-27 PROBLEM — E11.40 DIABETIC NEUROPATHY (MULTI): Status: RESOLVED | Noted: 2023-04-19 | Resolved: 2023-06-27

## 2023-06-27 PROBLEM — E03.9 ACQUIRED HYPOTHYROIDISM: Status: ACTIVE | Noted: 2023-06-27

## 2023-06-27 LAB — POC HEMOGLOBIN A1C: 7.4 % (ref 4.2–6.5)

## 2023-06-27 PROCEDURE — 3078F DIAST BP <80 MM HG: CPT | Performed by: FAMILY MEDICINE

## 2023-06-27 PROCEDURE — 1036F TOBACCO NON-USER: CPT | Performed by: FAMILY MEDICINE

## 2023-06-27 PROCEDURE — 3074F SYST BP LT 130 MM HG: CPT | Performed by: FAMILY MEDICINE

## 2023-06-27 PROCEDURE — 1159F MED LIST DOCD IN RCRD: CPT | Performed by: FAMILY MEDICINE

## 2023-06-27 PROCEDURE — 99214 OFFICE O/P EST MOD 30 MIN: CPT | Performed by: FAMILY MEDICINE

## 2023-06-27 PROCEDURE — 1160F RVW MEDS BY RX/DR IN RCRD: CPT | Performed by: FAMILY MEDICINE

## 2023-06-27 PROCEDURE — 83036 HEMOGLOBIN GLYCOSYLATED A1C: CPT | Performed by: FAMILY MEDICINE

## 2023-06-27 ASSESSMENT — PATIENT HEALTH QUESTIONNAIRE - PHQ9
5. POOR APPETITE OR OVEREATING: NEARLY EVERY DAY
2. FEELING DOWN, DEPRESSED OR HOPELESS: NEARLY EVERY DAY
4. FEELING TIRED OR HAVING LITTLE ENERGY: NEARLY EVERY DAY
3. TROUBLE FALLING OR STAYING ASLEEP: NEARLY EVERY DAY
1. LITTLE INTEREST OR PLEASURE IN DOING THINGS: NOT AT ALL
7. TROUBLE CONCENTRATING ON THINGS, SUCH AS READING THE NEWSPAPER OR WATCHING TELEVISION: SEVERAL DAYS
SUM OF ALL RESPONSES TO PHQ QUESTIONS 1-9: 16
10. IF YOU CHECKED OFF ANY PROBLEMS, HOW DIFFICULT HAVE THESE PROBLEMS MADE IT FOR YOU TO DO YOUR WORK, TAKE CARE OF THINGS AT HOME, OR GET ALONG WITH OTHER PEOPLE: VERY DIFFICULT
6. FEELING BAD ABOUT YOURSELF - OR THAT YOU ARE A FAILURE OR HAVE LET YOURSELF OR YOUR FAMILY DOWN: NOT AT ALL
9. THOUGHTS THAT YOU WOULD BE BETTER OFF DEAD, OR OF HURTING YOURSELF: NOT AT ALL
8. MOVING OR SPEAKING SO SLOWLY THAT OTHER PEOPLE COULD HAVE NOTICED. OR THE OPPOSITE, BEING SO FIGETY OR RESTLESS THAT YOU HAVE BEEN MOVING AROUND A LOT MORE THAN USUAL: NEARLY EVERY DAY
SUM OF ALL RESPONSES TO PHQ9 QUESTIONS 1 & 2: 3

## 2023-06-27 ASSESSMENT — ENCOUNTER SYMPTOMS
LOSS OF SENSATION IN FEET: 1
LEG PAIN: 1
DEPRESSION: 1
OCCASIONAL FEELINGS OF UNSTEADINESS: 1
COLOR CHANGE: 1

## 2023-06-27 ASSESSMENT — LIFESTYLE VARIABLES
SKIP TO QUESTIONS 9-10: 1
HOW OFTEN DO YOU HAVE A DRINK CONTAINING ALCOHOL: NEVER
HOW MANY STANDARD DRINKS CONTAINING ALCOHOL DO YOU HAVE ON A TYPICAL DAY: PATIENT DOES NOT DRINK
AUDIT-C TOTAL SCORE: 0
HOW OFTEN DO YOU HAVE SIX OR MORE DRINKS ON ONE OCCASION: NEVER

## 2023-06-27 NOTE — PATIENT INSTRUCTIONS
Diagnoses and all orders for this visit:  Atrial fibrillation by electrocardiogram (CMS/[facility])- has seen Miki. But now back with just Rose  -     apixaban (Eliquis) 5 mg tablet; Take 1 tablet (5 mg) by mouth 2 times a day.  CLL (chronic lymphocytic leukemia) (CMS/[facility])- saw Dr. Smallwood, will need referral to new oncologist.   Rheumatoid arthritis with positive rheumatoid factor, involving unspecified site (CMS/[facility])- stable.   Congestive heart failure, unspecified HF chronicity, unspecified heart failure type (CMS/[facility])- sees Rose annually.   Depression, recurrent (CMS/[facility])  Morbid obesity with body mass index (BMI) of 40.0 or higher (CMS/[facility])  Abnormal CT lung screening- multinodular lung mass, to have CT guided biopsy later this month. Will hopefully give answer.   Mixed hyperlipidemia- atorvastatin and lipid profile is excellent. Continue please.   Hyperglycemia  -     POCT glycosylated hemoglobin (Hb A1C) manually resulted  Bilateral lower extremity edema- treated for cellulitis and water pill did not really help.   -     Vascular US lower extremity venous insufficiency bilateral; Future  -     OLIVER without exercise; Future  Decreased pulses in feet- concern for peripheral vascular disease. If postiive testing. Will refer to vascular surgeon (Prefers [facility])   -     Vascular US lower extremity venous insufficiency bilateral; Future  -     OLIVER without exercise; Future  Discoloration of skin of foot  -     Vascular US lower extremity venous insufficiency bilateral; Future  -     OLIVER without exercise; Future

## 2023-06-27 NOTE — PROGRESS NOTES
Subjective   Patient ID: Alysia Magdaleno is a 72 y.o. female.    Leg Pain     72 year old female for follwoup in leg swelling redness and pain, still swollen and in a lot of pain, left is worse  Review of Systems   Cardiovascular:  Positive for leg swelling.   Skin:  Positive for color change and rash.        Redness bilateral lower extremeties, and swelling. Discoloration.    All other systems reviewed and are negative.      Objective   Physical Exam  Constitutional:       Appearance: Normal appearance.   HENT:      Head: Normocephalic and atraumatic.      Right Ear: Tympanic membrane normal.      Left Ear: Tympanic membrane normal.      Nose: Nose normal.      Mouth/Throat:      Mouth: Mucous membranes are moist.      Pharynx: Oropharynx is clear.   Eyes:      Extraocular Movements: Extraocular movements intact.      Conjunctiva/sclera: Conjunctivae normal.      Pupils: Pupils are equal, round, and reactive to light.   Cardiovascular:      Rate and Rhythm: Normal rate and regular rhythm.      Heart sounds: Normal heart sounds.      Comments: Pulse LE 1+ pitting edema bilateral lower extremeties  Pulmonary:      Effort: Pulmonary effort is normal.      Breath sounds: Normal breath sounds.   Abdominal:      General: Abdomen is flat. Bowel sounds are normal.      Palpations: Abdomen is soft.   Musculoskeletal:         General: Swelling present. Normal range of motion.      Cervical back: Normal range of motion and neck supple.      Right lower leg: Edema present.      Left lower leg: Edema present.   Skin:     General: Skin is warm and dry.      Capillary Refill: Capillary refill takes less than 2 seconds.      Findings: Erythema present.      Comments: Bilateral lower extremities     Neurological:      General: No focal deficit present.      Mental Status: She is alert and oriented to person, place, and time.   Psychiatric:         Mood and Affect: Mood normal.         Behavior: Behavior normal.       Assessment/Plan      Diagnoses and all orders for this visit:  Atrial fibrillation by electrocardiogram (CMS/[facility])- has seen Miki. But now back with just Rose  -     apixaban (Eliquis) 5 mg tablet; Take 1 tablet (5 mg) by mouth 2 times a day.  CLL (chronic lymphocytic leukemia) (CMS/[facility])- saw Dr. Smallwood, will need referral to new oncologist.   Rheumatoid arthritis with positive rheumatoid factor, involving unspecified site (CMS/[facility])- stable.   Congestive heart failure, unspecified HF chronicity, unspecified heart failure type (CMS/[facility])- sees Rose annually.   Depression, recurrent (CMS/[facility])  Morbid obesity with body mass index (BMI) of 40.0 or higher (CMS/[facility])  Abnormal CT lung screening- multinodular lung mass, to have CT guided biopsy later this month. Will hopefully give answer.   Mixed hyperlipidemia- atorvastatin and lipid profile is excellent. Continue please.

## 2023-06-27 NOTE — TELEPHONE ENCOUNTER
Pt asking if she should start Eliquis, it arrived today but she is concerned about taking because of her legs.

## 2023-07-10 ENCOUNTER — TELEPHONE (OUTPATIENT)
Dept: PRIMARY CARE | Facility: CLINIC | Age: 73
End: 2023-07-10
Payer: MEDICARE

## 2023-07-10 DIAGNOSIS — J01.00 ACUTE NON-RECURRENT MAXILLARY SINUSITIS: ICD-10-CM

## 2023-07-10 DIAGNOSIS — B37.31 VAGINAL CANDIDIASIS: Primary | ICD-10-CM

## 2023-07-11 RX ORDER — FLUCONAZOLE 150 MG/1
150 TABLET ORAL ONCE
Qty: 2 TABLET | Refills: 0 | Status: SHIPPED | OUTPATIENT
Start: 2023-07-11 | End: 2023-07-11

## 2023-07-13 LAB
C REACTIVE PROTEIN (MG/L) IN SER/PLAS: 1.12 MG/DL
INR IN PPP BY COAGULATION ASSAY: 1.1 (ref 0.9–1.1)
PROTHROMBIN TIME (PT) IN PPP BY COAGULATION ASSAY: 12.5 SEC (ref 9.8–12.8)
SEDIMENTATION RATE, ERYTHROCYTE: 15 MM/H (ref 0–30)
URATE (MG/DL) IN SER/PLAS: 6 MG/DL (ref 2.3–6.7)

## 2023-07-17 ENCOUNTER — HOSPITAL ENCOUNTER (OUTPATIENT)
Dept: DATA CONVERSION | Facility: HOSPITAL | Age: 73
End: 2023-07-17
Attending: THORACIC SURGERY (CARDIOTHORACIC VASCULAR SURGERY)
Payer: MEDICARE

## 2023-07-17 DIAGNOSIS — R91.1 SOLITARY PULMONARY NODULE: ICD-10-CM

## 2023-07-17 DIAGNOSIS — Z53.8 PROCEDURE AND TREATMENT NOT CARRIED OUT FOR OTHER REASONS: ICD-10-CM

## 2023-07-18 LAB
ERYTHROCYTE DISTRIBUTION WIDTH (RATIO) BY AUTOMATED COUNT: 18.3 % (ref 11.5–14.5)
ERYTHROCYTE MEAN CORPUSCULAR HEMOGLOBIN CONCENTRATION (G/DL) BY AUTOMATED: 28.7 G/DL (ref 32–36)
ERYTHROCYTE MEAN CORPUSCULAR VOLUME (FL) BY AUTOMATED COUNT: 100 FL (ref 80–100)
ERYTHROCYTES (10*6/UL) IN BLOOD BY AUTOMATED COUNT: 3.44 X10E12/L (ref 4–5.2)
HEMATOCRIT (%) IN BLOOD BY AUTOMATED COUNT: 34.5 % (ref 36–46)
HEMOGLOBIN (G/DL) IN BLOOD: 9.9 G/DL (ref 12–16)
LEUKOCYTES (10*3/UL) IN BLOOD BY AUTOMATED COUNT: 23.4 X10E9/L (ref 4.4–11.3)
PLATELETS (10*3/UL) IN BLOOD AUTOMATED COUNT: 236 X10E9/L (ref 150–450)

## 2023-07-19 ENCOUNTER — HOSPITAL ENCOUNTER (OUTPATIENT)
Dept: DATA CONVERSION | Facility: HOSPITAL | Age: 73
End: 2023-07-19
Attending: THORACIC SURGERY (CARDIOTHORACIC VASCULAR SURGERY) | Admitting: THORACIC SURGERY (CARDIOTHORACIC VASCULAR SURGERY)
Payer: MEDICARE

## 2023-07-19 DIAGNOSIS — C34.32 MALIGNANT NEOPLASM OF LOWER LOBE, LEFT BRONCHUS OR LUNG (MULTI): ICD-10-CM

## 2023-07-19 DIAGNOSIS — R91.1 SOLITARY PULMONARY NODULE: ICD-10-CM

## 2023-07-21 LAB
COMPLETE PATHOLOGY REPORT: NORMAL
CONVERTED ADDENDUM DIAGNOSIS 2: NORMAL
CONVERTED ADDENDUM DIAGNOSIS 3: NORMAL
CONVERTED CLINICAL DIAGNOSIS-HISTORY: NORMAL
CONVERTED FINAL DIAGNOSIS: NORMAL
CONVERTED FINAL REPORT PDF LINK TO COPY AND PASTE: NORMAL
CONVERTED GROSS DESCRIPTION: NORMAL

## 2023-08-09 DIAGNOSIS — K21.9 GERD WITHOUT ESOPHAGITIS: ICD-10-CM

## 2023-08-09 RX ORDER — DEXLANSOPRAZOLE 60 MG/1
60 CAPSULE, DELAYED RELEASE ORAL
Qty: 90 CAPSULE | Refills: 1 | Status: SHIPPED | OUTPATIENT
Start: 2023-08-09 | End: 2024-04-26

## 2023-09-01 ENCOUNTER — TELEPHONE (OUTPATIENT)
Dept: PRIMARY CARE | Facility: CLINIC | Age: 73
End: 2023-09-01

## 2023-09-01 NOTE — TELEPHONE ENCOUNTER
Patient wants to know if you can send something in for her throat  Says its been itchy about 1 week now  No other symptoms

## 2023-09-07 PROBLEM — R04.0 ANTERIOR EPISTAXIS: Status: ACTIVE | Noted: 2021-03-31

## 2023-09-07 PROBLEM — I10 HYPERTENSION: Status: ACTIVE | Noted: 2023-09-07

## 2023-09-07 PROBLEM — C44.92 SCC (SQUAMOUS CELL CARCINOMA): Status: ACTIVE | Noted: 2023-09-07

## 2023-09-07 PROBLEM — J34.89 PERFORATION OF NASAL SEPTUM: Status: ACTIVE | Noted: 2021-03-31

## 2023-09-07 PROBLEM — I01.1 RHEUMATOID AORTITIS: Status: ACTIVE | Noted: 2023-09-07

## 2023-09-07 PROBLEM — C34.90 ADENOCARCINOMA, LUNG (MULTI): Status: ACTIVE | Noted: 2023-09-07

## 2023-09-07 PROBLEM — E66.01 MORBID OBESITY (MULTI): Status: ACTIVE | Noted: 2021-08-13

## 2023-09-07 PROBLEM — I89.0 LYMPHEDEMA: Status: ACTIVE | Noted: 2023-09-07

## 2023-09-07 PROBLEM — H40.033 ANATOMICAL NARROW ANGLE BORDERLINE GLAUCOMA OF BOTH EYES: Status: ACTIVE | Noted: 2019-06-18

## 2023-09-07 PROBLEM — L40.50 PSORIATIC ARTHRITIS (MULTI): Status: ACTIVE | Noted: 2023-09-07

## 2023-09-07 PROBLEM — Z99.81 ON HOME O2: Status: ACTIVE | Noted: 2021-08-13

## 2023-09-07 PROBLEM — J31.0 CHRONIC RHINITIS: Status: ACTIVE | Noted: 2021-03-31

## 2023-09-07 RX ORDER — DICYCLOMINE HYDROCHLORIDE 10 MG/1
CAPSULE ORAL
COMMUNITY
End: 2024-01-30 | Stop reason: ALTCHOICE

## 2023-09-07 RX ORDER — BROMFENAC SODIUM 0.7 MG/ML
SOLUTION/ DROPS OPHTHALMIC
COMMUNITY
Start: 2021-08-16 | End: 2024-02-06 | Stop reason: WASHOUT

## 2023-09-07 RX ORDER — BESIFLOXACIN 6 MG/ML
SUSPENSION OPHTHALMIC
COMMUNITY
Start: 2021-08-16 | End: 2024-02-06 | Stop reason: WASHOUT

## 2023-09-07 RX ORDER — PREDNISONE 5 MG/1
5 TABLET ORAL DAILY
COMMUNITY
Start: 2019-09-26 | End: 2023-12-11 | Stop reason: WASHOUT

## 2023-09-07 RX ORDER — TIOTROPIUM BROMIDE 18 UG/1
CAPSULE ORAL; RESPIRATORY (INHALATION)
COMMUNITY
End: 2024-01-30 | Stop reason: ALTCHOICE

## 2023-09-07 RX ORDER — METHOCARBAMOL 500 MG/1
500 TABLET, FILM COATED ORAL 2 TIMES DAILY
COMMUNITY
Start: 2019-12-30 | End: 2024-01-30 | Stop reason: ALTCHOICE

## 2023-09-07 RX ORDER — TRIAMCINOLONE ACETONIDE 1 MG/G
PASTE DENTAL
COMMUNITY
Start: 2020-04-30 | End: 2024-02-06 | Stop reason: WASHOUT

## 2023-09-07 RX ORDER — FLUTICASONE PROPIONATE AND SALMETEROL 250; 50 UG/1; UG/1
POWDER RESPIRATORY (INHALATION)
COMMUNITY
Start: 2019-05-14 | End: 2024-01-30 | Stop reason: ALTCHOICE

## 2023-09-07 RX ORDER — LOTEPREDNOL ETABONATE 3.8 MG/G
GEL OPHTHALMIC
COMMUNITY
Start: 2021-08-16 | End: 2024-02-06 | Stop reason: WASHOUT

## 2023-10-02 ENCOUNTER — EVALUATION (OUTPATIENT)
Dept: OCCUPATIONAL THERAPY | Facility: HOSPITAL | Age: 73
End: 2023-10-02
Payer: MEDICARE

## 2023-10-02 DIAGNOSIS — I89.0 LYMPHEDEMA: Primary | ICD-10-CM

## 2023-10-02 PROCEDURE — 97535 SELF CARE MNGMENT TRAINING: CPT | Mod: GO | Performed by: OCCUPATIONAL THERAPIST

## 2023-10-02 PROCEDURE — 97165 OT EVAL LOW COMPLEX 30 MIN: CPT | Mod: GO | Performed by: OCCUPATIONAL THERAPIST

## 2023-10-02 ASSESSMENT — PAIN SCALES - GENERAL: PAINLEVEL_OUTOF10: 3

## 2023-10-02 ASSESSMENT — ENCOUNTER SYMPTOMS
LOSS OF SENSATION IN FEET: 0
DEPRESSION: 0
OCCASIONAL FEELINGS OF UNSTEADINESS: 0

## 2023-10-02 ASSESSMENT — PAIN - FUNCTIONAL ASSESSMENT: PAIN_FUNCTIONAL_ASSESSMENT: 0-10

## 2023-10-02 ASSESSMENT — ACTIVITIES OF DAILY LIVING (ADL): HOME_MANAGEMENT_TIME_ENTRY: 20

## 2023-10-02 NOTE — PROGRESS NOTES
Occupational Therapy    Evaluation/Treatment    Patient Name: Alysia Magdaleno  MRN: [id number]  : 1950  Today's Date: 10/02/23          Assessment: Pt has lymphedema in CYN Les with mild swelling. Pt would benefit from skilled OT to decrease size of LEs and for patient to be independent with home management of the chronic condition of lymphedema.     Prognosis: Good  Evaluation/Treatment Tolerance: Patient tolerated treatment well  Prognosis: Good  Evaluation/Treatment Tolerance: Patient tolerated treatment well  Plan:      Frequency: Pt reporting she does not want to schedule any follow-up visits at this time    Subjective   Current Problem:  1. Lymphedema  Referral to Occupational Therapy        General: Pt states she has been bitten by a cat twice, once in the left leg and once in the right.  Pt state this was 1-1.5 years ago.  Pt had some swelling.  Four months ago both legs were very large.  She does not know why.  She states she hit her leg with a small wagon and caused a wound.  States it took several months to heal.  Pt states she can not wear compression.    Pt lives alone.  She sleeps in a bed.  She is independent.    OT Received On: 10/02/23  General  Reason for Referral: CYN LE lymphedema  Referred By: Dr. Rose  Preferred Learning Style: kinesthetic, verbal, visual, written  Precautions:  STEADI Fall Risk Score (The score of 4 or more indicates an increased risk of falling): 0     Pain:  Pain Assessment  Pain Assessment: 0-10  Pain Score: 3  Pain Location: Leg    Objective     Prior Function: Independent             ADLs:  Pt was fitted with double size E tg  for the right and  left LEs.  She was provided with education on use, care and application and precautions. She was given handouts regarding the lymphatic system, lymphedema and treatment.  She feels her legs are close to normal in size.  She did want to schedule a follow up at this time. Pt said she would call.          Extremities:    Lymphedema Assessment         Right Lower Extremity:  R Metatarsal (cm): 20.5 cm  R Heel Y Angle: 28 cm  R Ankle (cm): 24.5 cm  R 10 cm Above Ankle (cm): 22 cm  R 20 cm Above Ankle (cm): 32 cm  R 30 cm Above Ankle (cm): 34 cm  Left Lower Extremity:  L Metatarsal (cm): 21.5 cm  L Heel Y Angle: 29 cm  L Ankle (cm): 24 cm  L 10 cm Above Ankle (cm): 21.5 cm  L 20 cm Above Ankle (cm): 32.5 cm  L 30 cm Above Ankle (cm): 35 cm  LE Skin Appearance/Condition and Girth:  Dryness: yes  Edema - Pitting: yes  Hemosiderin Staining: yes  +Stemmer Sign: yes       ADL:     Prior Function:     Pain Assessment:  Pain Assessment: 0-10  Pain Score: 3  Pain Location: Leg  Therapy Precautions:  STEADI Fall Risk Score (The score of 4 or more indicates an increased risk of falling): 0      Outcome Measures: LLIS score of 7 which is 10% impaired.         OP EDUCATION:  Education  Individual(s) Educated: Patient  Education Provided: Lymphedema, Edema control  Home Program: Edema control  Equipment: Stockinette  Risk and Benefits Discussed with Patient/Caregiver/Other: yes  Patient/Caregiver Demonstrated Understanding: yes  Plan of Care Discussed and Agreed Upon: yes  Patient Response to Education: Patient/Caregiver Verbalized Understanding of Information    Goals:  STG: Decrease girth in bilateral LEs by 10 cm each for improved mobility and better clothing/shoe fit.    STG: Pt will be independent with self management, including use of garments, pump if indicated, self MLD, ability to recognize signs of infection/cellulitis and exercise/HEP to minimize risk of progression of symptoms and skin infections/cellulitis.   LTG: Pt will show improvement on the LLIS impairment scale to no greater than 3% impaired.

## 2023-10-02 NOTE — LETTER
October 2, 2023     Patient: Alysia Magdaleno   YOB: 1950   Date of Visit: 10/2/2023       To Whom it May Concern:    Alysia Magdaleno was seen in my clinic on 10/2/2023. She {Return to school/sport:18897}.    If you have any questions or concerns, please don't hesitate to call.         Sincerely,          María Osorio, OT        CC: No Recipients

## 2023-10-02 NOTE — Clinical Note
October 2, 2023     Patient: Alysia Magdaleno   YOB: 1950   Date of Visit: 10/2/2023       To Whom It May Concern:    It is my medical opinion that Alysia Magdaleno {Work release (duty restriction):60220}.    If you have any questions or concerns, please don't hesitate to call.         Sincerely,        María Osorio, OT    CC: No Recipients

## 2023-10-12 ENCOUNTER — OFFICE VISIT (OUTPATIENT)
Dept: RHEUMATOLOGY | Facility: CLINIC | Age: 73
End: 2023-10-12
Payer: MEDICARE

## 2023-10-12 DIAGNOSIS — M05.79 RHEUMATOID ARTHRITIS INVOLVING MULTIPLE SITES WITH POSITIVE RHEUMATOID FACTOR (MULTI): Primary | ICD-10-CM

## 2023-10-12 PROCEDURE — 1160F RVW MEDS BY RX/DR IN RCRD: CPT | Performed by: INTERNAL MEDICINE

## 2023-10-12 PROCEDURE — 1159F MED LIST DOCD IN RCRD: CPT | Performed by: INTERNAL MEDICINE

## 2023-10-12 PROCEDURE — 99212 OFFICE O/P EST SF 10 MIN: CPT | Performed by: INTERNAL MEDICINE

## 2023-10-12 PROCEDURE — 1036F TOBACCO NON-USER: CPT | Performed by: INTERNAL MEDICINE

## 2023-10-12 PROCEDURE — 1125F AMNT PAIN NOTED PAIN PRSNT: CPT | Performed by: INTERNAL MEDICINE

## 2023-10-12 NOTE — PATIENT INSTRUCTIONS
Resume methotrexate 6 pills/week next week.  Continue sulfasalazine.  Call me if any question.  Follow-up in 3 months.

## 2023-10-12 NOTE — PROGRESS NOTES
"Subjective   Patient ID: Alysia Magdaleno is a 73 y.o. female who presents for Follow-up and Arthritis.    Arthritis      73-year-old female with history of seropositive RA, psoriasis, chronic back pain, OA s/p bilateral TKR, s/p bilateral THR, CLL, COPD, PAF, GERD, diabetes, mandibular osteomyelitis s/p graft surgery presented for follow-up.    She was diagnosed couple months ago with adenocarcinoma of the lung.  She underwent radiation therapy.  Last radiation was given on September 20.  Methotrexate was put on hold prior to radiation therapy.  She continues to take sulfasalazine.    She stated that she is doing well.  She has not noticed swelling or pain of the joints.  She states she received couple IV iron infusions in August.    She reports scattered small psoriatic rashes involving the upper and lower extremities.    Immunosuppression: Methotrexate and sulfasalazine(9/2022).    Past immunosuppression: Prednisone.  Tapered to discontinued in July 2022.      Review of Systems   Musculoskeletal:  Positive for arthritis.   All other systems reviewed and are negative.    Objective .      5/30/2023     2:13 PM 6/14/2023     2:08 PM 6/15/2023     9:40 AM 6/21/2023     3:37 PM 6/27/2023     1:15 PM 7/13/2023     4:15 PM 10/12/2023     4:22 PM   Vitals   Systolic 104 113 138 130 124 123    Diastolic 66 73 82 72 60 68    Heart Rate 64 78 78 78 74     Temp 36.3 °C (97.3 °F)    36.7 °C (98 °F)     Resp 18 16   20     Height (in) 1.549 m (5' 0.98\") 1.524 m (5')  1.524 m (5') 1.524 m (5') 1.524 m (5')    Weight (lb) 235.01 232 233.5 232 228 231    BMI 44.43 kg/m2 45.31 kg/m2 45.6 kg/m2 45.31 kg/m2 44.53 kg/m2 45.11 kg/m2    BSA (m2) 2.15 m2 2.11 m2 2.12 m2 2.11 m2 2.09 m2 2.11 m2    Visit Report   Report  Report  Report      Physical Exam  Gen. AAO x3, NAD.  HEENT: No pallor or icterus, PERRLA, EOMI.   Skin: Scattered small scaly rashes involving the upper and lower extremities.    Heart: S1, S2/ RRR.   Lungs: CTA " B.  Abdomen: Soft, NT/ND,  MSK: No swollen or tender joint.  Bilateral CMC squaring.  Right CMC with positive grinding test.  Bilateral first MTP hypertrophy.  Neuro: Sensation to touch intact.Strength 5/5 throughout.   Psych:Appropriate mood and behavior  EXT: 2+ ankle edema.    Assessment/Plan .  73-year-old female with history of seropositive RA, psoriasis, chronic back pain, OA s/p bilateral TKR, s/p bilateral THR, CLL, COPD, PAF, GERD, diabetes, mandibular osteomyelitis s/p graft surgery presented for follow-up.    #1: Seropositive rheumatoid arthritis.  She is doing well.  -Resume methotrexate at 15 mg/week next week.  -Continue sulfasalazine twice a day.  -Labs reviewed.    Follow-up in 3 months.     This note was partially generated using the Dragon Voice recognition system. There may be some incorrect wording, spelling and/or spelling errors or punctuation errors that were not corrected prior to committing the note to the medical record.    Patient Active Problem List   Diagnosis    Abdominal pain    Back pain    Abnormal CT lung screening    Acquired spondylolisthesis    Allergic dermatitis    Arthritis    Pharyngitis    Atrial fibrillation by electrocardiogram (CMS/HCC)    Benign essential hypertension    Breathing-related sleep disorder    Cervical myofascial strain    Changing skin lesion    CHF (congestive heart failure) (CMS/HCC)    Chronic diastolic heart failure (CMS/HCC)    Chronic idiopathic constipation    Chronic neck pain    Chronic pain    CLL (chronic lymphocytic leukemia) (CMS/HCC)    Colon polyps    Acute angle-closure glaucoma    Conjunctivitis, acute    Coronary artery calcification    Dental infection    Depression    Dysphagia    COPD (chronic obstructive pulmonary disease) (CMS/HCC)    Emphysema/COPD (CMS/HCC)    Exocrine pancreatic insufficiency    GERD without esophagitis    Hemorrhoids    History of colon polyps    Hyperglycemia    Hyperlipidemia    Insomnia    Irritable bowel  syndrome with diarrhea    Irritable bowel syndrome    Left leg pain    Lesion of finger    Lung infiltrate    Lung nodules    LV dysfunction    Morbid obesity with body mass index (BMI) of 40.0 or higher (CMS/HCC)    Multilevel degenerative disc disease    Myofascial pain syndrome    Osteoarthritis of both knees    Osteoarthritis of right hip    Otalgia of both ears    PAF (paroxysmal atrial fibrillation) (CMS/HCC)    Pain management    Pain of both sacroiliac joints    Pain of left upper extremity    Pain of right hip joint    Polyarthralgia    Polyarticular osteoarthritis    Pruritus    Reactive airway disease    Restless legs syndrome    Rheumatoid arthritis with rheumatoid factor (CMS/HCC)    Rib pain    RUQ pain    Smoking    SOB (shortness of breath)    Typical atrial flutter (CMS/HCC)    Acquired hypothyroidism    Bilateral lower extremity edema    Decreased pulses in feet    SCC (squamous cell carcinoma)    Adenocarcinoma, lung (CMS/HCC)    Anatomical narrow angle borderline glaucoma of both eyes    Anterior epistaxis    Chronic rhinitis    Hypertension    Lymphedema    Morbid obesity (CMS/HCC)    On home O2    Perforation of nasal septum    Psoriatic arthritis (CMS/HCC)    Rheumatoid aortitis      Past Surgical History:   Procedure Laterality Date    CT GUIDED PERCUTANEOUS BIOPSY LUNG  7/19/2023    CT GUIDED PERCUTANEOUS BIOPSY LUNG [place] [facility] [place]    GALLBLADDER SURGERY  01/12/2016    Gallbladder Surgery    HIP SURGERY  01/12/2016    Hip Surgery    KNEE SURGERY  01/12/2016    Knee Surgery    OTHER SURGICAL HISTORY  04/22/2019    Throat surgery    OTHER SURGICAL HISTORY  09/18/2019    Surgery    OTHER SURGICAL HISTORY  09/18/2019    Iridotomy peripheral  laser    OTHER SURGICAL HISTORY  09/18/2019    Jaw surgery    OTHER SURGICAL HISTORY  09/18/2019    Cholecystectomy    OTHER SURGICAL HISTORY  09/18/2019    Hysterectomy      Social History     Tobacco Use    Smoking status: Former     Types: Cigarettes      Passive exposure: Past    Smokeless tobacco: Never   Substance Use Topics    Alcohol use: Not Currently      Allergies   Allergen Reactions    Adhesive Unknown    Adhesive Tape-Silicones Itching    Aspirin Unknown and Hives    Grass Pollen Unknown    Hydrocodone-Acetaminophen Unknown     nausea    Morphine Hives    Naproxen Unknown    Niacin Unknown    Nsaids (Non-Steroidal Anti-Inflammatory Drug) Unknown    Opioids-Meperidine And Related Other    Tizanidine Unknown    Other Rash     gold plated metal      Current Outpatient Medications   Medication Instructions    albuterol (ProAir HFA) 90 mcg/actuation inhaler 2 puffs, inhalation, 4 times daily RT    apixaban (ELIQUIS) 5 mg, oral, 2 times daily    atorvastatin (Lipitor) 20 mg tablet 1 tablet, oral, Nightly    besifloxacin (Besivance) 0.6 % drops,suspension     Breztri Aerosphere 160-9-4.8 mcg/actuation HFA aerosol inhaler 2 puffs, inhalation, 2 times daily    bromfenac (Prolensa) 0.07 % drops     cloNIDine (CATAPRES) 0.1 mg, oral, Every 8 hours    cream base no.31, bulk, (Transdermal Pain Base) cream 1 Application, Topical, See admin instructions, Apply as directed by physician    dexlansoprazole (DEXILANT) 60 mg, oral, Daily before breakfast    dicyclomine (Bentyl) 10 mg capsule take 1 capsule by mouth twice a day or up to four times a day if needed for ABDOMINAL CRAMPING    famotidine (Pepcid) 40 mg tablet take 1 tablet by mouth once daily    fluticasone propion-salmeteroL (Advair Diskus) 250-50 mcg/dose diskus inhaler Advair Diskus 250 mcg-50 mcg/dose powder for inhalation    fluticasone-umeclidin-vilanter (Trelegy Ellipta) 200-62.5-25 mcg blister with device INHALE 1 PUFF INTO THE LUNGS ONCE DAILY, RINSE MOUTH AFTER EACH USE WITH WATER AND THEN SPIT IT OUT    folic acid (Folvite) 1 mg tablet 1 tablet, oral, Daily    gabapentin (Neurontin) 300 mg capsule 1 capsule, oral, 3 times daily    guaiFENesin (Mucinex) 600 mg 12 hr tablet oral    ipratropium-albuteroL  (Duo-Neb) 0.5-2.5 mg/3 mL nebulizer solution 3 mL, inhalation, Every 4 hours PRN    Linzess 72 mcg capsule 1 capsule, oral, Daily    loratadine (Claritin Liqui-Gel) 10 mg capsule oral    loteprednol etabonate (Lotemax SM) 0.38 % drops,gel     methocarbamol (ROBAXIN) 500 mg, oral, 2 times daily    methotrexate (Trexall) 2.5 mg tablet oral, Weekly    montelukast (Singulair) 10 mg tablet 1 tablet, oral, Daily    mupirocin (Bactroban) 2 % ointment Topical, As needed    naloxone (NARCAN) 4 mg, nasal, As needed    predniSONE (DELTASONE) 5 mg, oral, Daily    propafenone (RYTHMOL) 225 mg, oral, Every 8 hours    roflumilast (DALIRESP) 500 mcg, oral, Daily    sulfaSALAzine (AZULFIDINE) 500 mg, oral, 2 times daily    tapentadol (Nucynta) 75 mg tablet 1 tablet, oral, Daily    tiotropium (Spiriva with HandiHaler) 18 mcg inhalation capsule Spiriva with HandiHaler 18 mcg and inhalation capsules    traZODone (DESYREL) 50 mg, oral, Nightly PRN    triamcinolone (Kenalog) 0.1 % oral paste apply sparingly to affected area three times a day

## 2023-11-13 DIAGNOSIS — M79.2 NEUROPATHIC PAIN: Primary | ICD-10-CM

## 2023-11-13 DIAGNOSIS — M15.9 POLYARTICULAR OSTEOARTHRITIS: ICD-10-CM

## 2023-11-13 NOTE — TELEPHONE ENCOUNTER
Pt is requesting refill of  gabapentin 300 mg 1 capsule TID [facility]                                                         LV:  6/14/23                 NV:  12/18/23               Pended RX to LEONARDO Trinh for transmission to pharmacy.

## 2023-11-14 RX ORDER — GABAPENTIN 300 MG/1
300 CAPSULE ORAL 3 TIMES DAILY
Qty: 90 CAPSULE | Refills: 2 | Status: SHIPPED | OUTPATIENT
Start: 2023-11-14 | End: 2024-01-30 | Stop reason: SDUPTHER

## 2023-12-11 ENCOUNTER — OFFICE VISIT (OUTPATIENT)
Dept: PRIMARY CARE | Facility: CLINIC | Age: 73
End: 2023-12-11
Payer: MEDICARE

## 2023-12-11 ENCOUNTER — LAB (OUTPATIENT)
Dept: LAB | Facility: LAB | Age: 73
End: 2023-12-11
Payer: MEDICARE

## 2023-12-11 VITALS
DIASTOLIC BLOOD PRESSURE: 78 MMHG | WEIGHT: 222.4 LBS | HEART RATE: 66 BPM | BODY MASS INDEX: 43.66 KG/M2 | SYSTOLIC BLOOD PRESSURE: 132 MMHG | OXYGEN SATURATION: 92 % | HEIGHT: 60 IN

## 2023-12-11 DIAGNOSIS — E03.9 ACQUIRED HYPOTHYROIDISM: ICD-10-CM

## 2023-12-11 DIAGNOSIS — I48.3 TYPICAL ATRIAL FLUTTER (MULTI): ICD-10-CM

## 2023-12-11 DIAGNOSIS — F32.1 CURRENT MODERATE EPISODE OF MAJOR DEPRESSIVE DISORDER WITHOUT PRIOR EPISODE (MULTI): ICD-10-CM

## 2023-12-11 DIAGNOSIS — C34.90 ADENOCARCINOMA OF LUNG, UNSPECIFIED LATERALITY (MULTI): ICD-10-CM

## 2023-12-11 DIAGNOSIS — J44.9 CHRONIC OBSTRUCTIVE PULMONARY DISEASE, UNSPECIFIED COPD TYPE (MULTI): ICD-10-CM

## 2023-12-11 DIAGNOSIS — R73.02 IMPAIRED GLUCOSE TOLERANCE: ICD-10-CM

## 2023-12-11 DIAGNOSIS — Z99.81 ON HOME O2: ICD-10-CM

## 2023-12-11 DIAGNOSIS — L40.50 PSORIATIC ARTHRITIS (MULTI): ICD-10-CM

## 2023-12-11 DIAGNOSIS — I50.22 CHRONIC SYSTOLIC CONGESTIVE HEART FAILURE (MULTI): Primary | ICD-10-CM

## 2023-12-11 DIAGNOSIS — Z00.00 ROUTINE GENERAL MEDICAL EXAMINATION AT HEALTH CARE FACILITY: ICD-10-CM

## 2023-12-11 DIAGNOSIS — Z00.00 MEDICARE ANNUAL WELLNESS VISIT, SUBSEQUENT: ICD-10-CM

## 2023-12-11 DIAGNOSIS — I10 BENIGN ESSENTIAL HYPERTENSION: ICD-10-CM

## 2023-12-11 DIAGNOSIS — M05.79 RHEUMATOID ARTHRITIS INVOLVING MULTIPLE SITES WITH POSITIVE RHEUMATOID FACTOR (MULTI): ICD-10-CM

## 2023-12-11 DIAGNOSIS — I48.91 ATRIAL FIBRILLATION BY ELECTROCARDIOGRAM (MULTI): ICD-10-CM

## 2023-12-11 DIAGNOSIS — G89.4 CHRONIC PAIN SYNDROME: ICD-10-CM

## 2023-12-11 DIAGNOSIS — I50.32 CHRONIC DIASTOLIC HEART FAILURE (MULTI): ICD-10-CM

## 2023-12-11 DIAGNOSIS — J41.8 MIXED SIMPLE AND MUCOPURULENT CHRONIC BRONCHITIS (MULTI): ICD-10-CM

## 2023-12-11 DIAGNOSIS — C91.10 CLL (CHRONIC LYMPHOCYTIC LEUKEMIA) (MULTI): ICD-10-CM

## 2023-12-11 DIAGNOSIS — K21.9 GERD WITHOUT ESOPHAGITIS: ICD-10-CM

## 2023-12-11 DIAGNOSIS — E66.01 CLASS 3 SEVERE OBESITY DUE TO EXCESS CALORIES WITH SERIOUS COMORBIDITY AND BODY MASS INDEX (BMI) OF 40.0 TO 44.9 IN ADULT (MULTI): ICD-10-CM

## 2023-12-11 DIAGNOSIS — K59.04 CHRONIC IDIOPATHIC CONSTIPATION: ICD-10-CM

## 2023-12-11 DIAGNOSIS — E78.2 MIXED HYPERLIPIDEMIA: ICD-10-CM

## 2023-12-11 DIAGNOSIS — K58.0 IRRITABLE BOWEL SYNDROME WITH DIARRHEA: ICD-10-CM

## 2023-12-11 DIAGNOSIS — I48.0 PAF (PAROXYSMAL ATRIAL FIBRILLATION) (MULTI): ICD-10-CM

## 2023-12-11 DIAGNOSIS — I10 PRIMARY HYPERTENSION: ICD-10-CM

## 2023-12-11 PROBLEM — E66.813 CLASS 3 SEVERE OBESITY DUE TO EXCESS CALORIES WITH SERIOUS COMORBIDITY AND BODY MASS INDEX (BMI) OF 40.0 TO 44.9 IN ADULT: Status: ACTIVE | Noted: 2023-04-19

## 2023-12-11 PROBLEM — C44.92 SCC (SQUAMOUS CELL CARCINOMA): Status: RESOLVED | Noted: 2023-09-07 | Resolved: 2023-12-11

## 2023-12-11 LAB
POC APPEARANCE, URINE: CLEAR
POC BILIRUBIN, URINE: NEGATIVE
POC BLOOD, URINE: NEGATIVE
POC COLOR, URINE: YELLOW
POC GLUCOSE, URINE: NEGATIVE MG/DL
POC KETONES, URINE: NEGATIVE MG/DL
POC LEUKOCYTES, URINE: NEGATIVE
POC NITRITE,URINE: NEGATIVE
POC PH, URINE: 5.5 PH
POC PROTEIN, URINE: NEGATIVE MG/DL
POC SPECIFIC GRAVITY, URINE: 1.01
POC UROBILINOGEN, URINE: 0.2 EU/DL
T4 FREE SERPL-MCNC: 0.78 NG/DL (ref 0.61–1.12)
TSH SERPL-ACNC: 5.74 MIU/L (ref 0.44–3.98)

## 2023-12-11 PROCEDURE — G0439 PPPS, SUBSEQ VISIT: HCPCS | Performed by: FAMILY MEDICINE

## 2023-12-11 PROCEDURE — 84443 ASSAY THYROID STIM HORMONE: CPT

## 2023-12-11 PROCEDURE — 99214 OFFICE O/P EST MOD 30 MIN: CPT | Performed by: FAMILY MEDICINE

## 2023-12-11 PROCEDURE — 1170F FXNL STATUS ASSESSED: CPT | Performed by: FAMILY MEDICINE

## 2023-12-11 PROCEDURE — 84439 ASSAY OF FREE THYROXINE: CPT

## 2023-12-11 PROCEDURE — 3078F DIAST BP <80 MM HG: CPT | Performed by: FAMILY MEDICINE

## 2023-12-11 PROCEDURE — 1036F TOBACCO NON-USER: CPT | Performed by: FAMILY MEDICINE

## 2023-12-11 PROCEDURE — 1159F MED LIST DOCD IN RCRD: CPT | Performed by: FAMILY MEDICINE

## 2023-12-11 PROCEDURE — 3008F BODY MASS INDEX DOCD: CPT | Performed by: FAMILY MEDICINE

## 2023-12-11 PROCEDURE — 83036 HEMOGLOBIN GLYCOSYLATED A1C: CPT

## 2023-12-11 PROCEDURE — 3075F SYST BP GE 130 - 139MM HG: CPT | Performed by: FAMILY MEDICINE

## 2023-12-11 PROCEDURE — 1125F AMNT PAIN NOTED PAIN PRSNT: CPT | Performed by: FAMILY MEDICINE

## 2023-12-11 PROCEDURE — 36415 COLL VENOUS BLD VENIPUNCTURE: CPT

## 2023-12-11 PROCEDURE — 81003 URINALYSIS AUTO W/O SCOPE: CPT | Performed by: FAMILY MEDICINE

## 2023-12-11 PROCEDURE — 1160F RVW MEDS BY RX/DR IN RCRD: CPT | Performed by: FAMILY MEDICINE

## 2023-12-11 RX ORDER — ROFLUMILAST 500 UG/1
500 TABLET ORAL DAILY
Qty: 90 TABLET | Refills: 3 | Status: SHIPPED | OUTPATIENT
Start: 2023-12-11 | End: 2024-12-10

## 2023-12-11 ASSESSMENT — PATIENT HEALTH QUESTIONNAIRE - PHQ9
SUM OF ALL RESPONSES TO PHQ9 QUESTIONS 1 AND 2: 0
2. FEELING DOWN, DEPRESSED OR HOPELESS: NOT AT ALL
1. LITTLE INTEREST OR PLEASURE IN DOING THINGS: NOT AT ALL
SUM OF ALL RESPONSES TO PHQ9 QUESTIONS 1 AND 2: 0
1. LITTLE INTEREST OR PLEASURE IN DOING THINGS: NOT AT ALL
2. FEELING DOWN, DEPRESSED OR HOPELESS: NOT AT ALL

## 2023-12-11 ASSESSMENT — COLUMBIA-SUICIDE SEVERITY RATING SCALE - C-SSRS
1. IN THE PAST MONTH, HAVE YOU WISHED YOU WERE DEAD OR WISHED YOU COULD GO TO SLEEP AND NOT WAKE UP?: NO
6. HAVE YOU EVER DONE ANYTHING, STARTED TO DO ANYTHING, OR PREPARED TO DO ANYTHING TO END YOUR LIFE?: NO
2. HAVE YOU ACTUALLY HAD ANY THOUGHTS OF KILLING YOURSELF?: NO

## 2023-12-11 ASSESSMENT — ENCOUNTER SYMPTOMS
LOSS OF SENSATION IN FEET: 0
DEPRESSION: 0
ABDOMINAL PAIN: 1
ARTHRALGIAS: 1
OCCASIONAL FEELINGS OF UNSTEADINESS: 0
BACK PAIN: 1

## 2023-12-11 ASSESSMENT — ACTIVITIES OF DAILY LIVING (ADL)
TAKING_MEDICATION: INDEPENDENT
DOING_HOUSEWORK: INDEPENDENT
GROCERY_SHOPPING: INDEPENDENT
BATHING: INDEPENDENT
MANAGING_FINANCES: INDEPENDENT
DRESSING: INDEPENDENT

## 2023-12-11 NOTE — PATIENT INSTRUCTIONS
Assessment/Plan   Problem List Items Addressed This Visit       Atrial fibrillation by electrocardiogram (CMS/HCC)    Benign essential hypertension- will follow up with cardiology       CHF (congestive heart failure) (CMS/[facility]) - Primary    Chronic diastolic heart failure (CMS/HCC)    Chronic idiopathic constipation    Chronic pain    CLL (chronic lymphocytic leukemia) (CMS/[facility])    Depression    COPD (chronic obstructive pulmonary disease) (CMS/[facility])    Relevant Medications    roflumilast (Daliresp) 500 mcg tablet    Other Relevant Orders    POCT UA Automated manually resulted    GERD without esophagitis- om famotidine and rabeprazole    Hyperlipidemia- atorvastatin    Irritable bowel syndrome with diarrhea    Class 3 severe obesity due to excess calories with serious comorbidity and body mass index (BMI) of 40.0 to 44.9 in adult (CMS/HCC)- stable.     PAF (paroxysmal atrial fibrillation) (CMS/HCC)    Rheumatoid arthritis with rheumatoid factor (CMS/HCC)- will follow up with Dr. Rivero.     Typical atrial flutter (CMS/HCC)    Acquired hypothyroidism- recheck TSH    Relevant Orders    TSH with reflex to Free T4 if abnormal    Adenocarcinoma, lung (CMS/HCC)    Hypertension    Overview     Last Assessment & Plan: Formatting of this note might be different from the original. Assessment: good to fair control on clonidine, metoprolol         On home O2    Overview     Last Assessment & Plan: Formatting of this note might be different from the original. Assessment: uses 2L nasal cannula O2 at night         Psoriatic arthritis (CMS/[facility])    Overview     Last Assessment & Plan: Formatting of this note might be different from the original. Assessment: follows with rheumatologist; stable on Rx          Other Visit Diagnoses       Routine general medical examination at health care facility        Impaired glucose tolerance        Relevant Orders    Hemoglobin A1C    Medicare annual wellness visit, subsequent            Is in  process of getting medical marijuana card.   GERD- has dexilant and famotidine.   Recheck four months, should have CT chest and abdomen.

## 2023-12-11 NOTE — PROGRESS NOTES
Subjective   Patient ID: Alysia Magdaleno is a 73 y.o. female.Subjective   Reason for Visit: Alysia Magdaleno is an 73 y.o. female here for a Medicare Wellness visit.     Past Medical, Surgical, and Family History reviewed and updated in chart.    Reviewed all medications by prescribing practitioner or clinical pharmacist (such as prescriptions, OTCs, herbal therapies and supplements) and documented in the medical record.    HPI    Patient Care Team:  Xi Dwyer MD as PCP - General (Family Medicine)     Review of Systems   Gastrointestinal:  Positive for abdominal pain.   Musculoskeletal:  Positive for arthralgias and back pain.       Objective   Vitals:  /78   Pulse 66   Ht 1.524 m (5')   Wt 101 kg (222 lb 6.4 oz)   SpO2 92%   BMI 43.43 kg/m²       Physical Exam  Vitals reviewed.   Constitutional:       Appearance: Normal appearance.   HENT:      Head: Normocephalic and atraumatic.      Right Ear: Tympanic membrane normal.      Left Ear: Tympanic membrane normal.      Nose: Nose normal.      Mouth/Throat:      Mouth: Mucous membranes are moist.      Pharynx: Oropharynx is clear.   Eyes:      Extraocular Movements: Extraocular movements intact.      Conjunctiva/sclera: Conjunctivae normal.      Pupils: Pupils are equal, round, and reactive to light.   Cardiovascular:      Rate and Rhythm: Normal rate and regular rhythm.      Pulses: Normal pulses.      Heart sounds: Normal heart sounds.   Pulmonary:      Effort: Pulmonary effort is normal.      Breath sounds: Normal breath sounds.   Abdominal:      General: Abdomen is flat. Bowel sounds are normal.      Palpations: Abdomen is soft.   Musculoskeletal:         General: Tenderness present.      Cervical back: Normal range of motion and neck supple.      Comments: Using rollator  Right flank pain.    Skin:     General: Skin is warm and dry.      Capillary Refill: Capillary refill takes less than 2 seconds.      Comments: No rash or radiation burn on  skin     Neurological:      General: No focal deficit present.      Mental Status: She is alert and oriented to person, place, and time.   Psychiatric:         Mood and Affect: Mood normal.         Behavior: Behavior normal.         Assessment/Plan   Problem List Items Addressed This Visit       Atrial fibrillation by electrocardiogram (CMS/HCC)    Benign essential hypertension- will follow up with cardiology       CHF (congestive heart failure) (CMS/HCC) - Primary    Chronic diastolic heart failure (CMS/[facility])    Chronic idiopathic constipation    Chronic pain    CLL (chronic lymphocytic leukemia) (CMS/HCC)    Depression    COPD (chronic obstructive pulmonary disease) (CMS/HCC)    Relevant Medications    roflumilast (Daliresp) 500 mcg tablet    Other Relevant Orders    POCT UA Automated manually resulted    GERD without esophagitis- om famotidine and rabeprazole    Hyperlipidemia- atorvastatin    Irritable bowel syndrome with diarrhea    Class 3 severe obesity due to excess calories with serious comorbidity and body mass index (BMI) of 40.0 to 44.9 in adult (CMS/HCC)- stable.     PAF (paroxysmal atrial fibrillation) (CMS/HCC)    Rheumatoid arthritis with rheumatoid factor (CMS/HCC)- will follow up with Dr. Rivero.     Typical atrial flutter (CMS/[facility])    Acquired hypothyroidism- recheck TSH    Relevant Orders    TSH with reflex to Free T4 if abnormal    Adenocarcinoma, lung (CMS/HCC)    Hypertension    Overview     Last Assessment & Plan: Formatting of this note might be different from the original. Assessment: good to fair control on clonidine, metoprolol         On home O2    Overview     Last Assessment & Plan: Formatting of this note might be different from the original. Assessment: uses 2L nasal cannula O2 at night         Psoriatic arthritis (CMS/[facility])    Overview     Last Assessment & Plan: Formatting of this note might be different from the original. Assessment: follows with rheumatologist; stable on Rx           Other Visit Diagnoses       Routine general medical examination at health care facility        Impaired glucose tolerance        Relevant Orders    Hemoglobin A1C    Medicare annual wellness visit, subsequent            Is in process of getting medical marijuana card.   GERD- has dexilant and famotidine.   Recheck four months, should have CT chest and abdomen.

## 2023-12-12 LAB
EST. AVERAGE GLUCOSE BLD GHB EST-MCNC: 157 MG/DL
HBA1C MFR BLD: 7.1 %

## 2023-12-13 ENCOUNTER — APPOINTMENT (OUTPATIENT)
Dept: RADIATION ONCOLOGY | Facility: CLINIC | Age: 73
End: 2023-12-13
Payer: MEDICARE

## 2023-12-13 ENCOUNTER — HOSPITAL ENCOUNTER (OUTPATIENT)
Dept: RADIATION ONCOLOGY | Facility: CLINIC | Age: 73
Setting detail: RADIATION/ONCOLOGY SERIES
Discharge: HOME | End: 2023-12-13
Payer: MEDICARE

## 2023-12-13 VITALS
OXYGEN SATURATION: 94 % | DIASTOLIC BLOOD PRESSURE: 64 MMHG | TEMPERATURE: 97.7 F | HEART RATE: 64 BPM | WEIGHT: 219.6 LBS | BODY MASS INDEX: 42.89 KG/M2 | SYSTOLIC BLOOD PRESSURE: 130 MMHG | RESPIRATION RATE: 20 BRPM

## 2023-12-13 DIAGNOSIS — C34.32 MALIGNANT NEOPLASM OF LOWER LOBE OF LEFT LUNG (MULTI): Primary | ICD-10-CM

## 2023-12-13 PROCEDURE — 99214 OFFICE O/P EST MOD 30 MIN: CPT | Performed by: STUDENT IN AN ORGANIZED HEALTH CARE EDUCATION/TRAINING PROGRAM

## 2023-12-13 RX ORDER — MINERAL OIL
180 ENEMA (ML) RECTAL DAILY
COMMUNITY
End: 2024-05-03 | Stop reason: WASHOUT

## 2023-12-13 ASSESSMENT — PATIENT HEALTH QUESTIONNAIRE - PHQ9
SUM OF ALL RESPONSES TO PHQ9 QUESTIONS 1 & 2: 0
1. LITTLE INTEREST OR PLEASURE IN DOING THINGS: NOT AT ALL
2. FEELING DOWN, DEPRESSED OR HOPELESS: NOT AT ALL

## 2023-12-13 ASSESSMENT — ENCOUNTER SYMPTOMS
DEPRESSION: 0
LOSS OF SENSATION IN FEET: 0
OCCASIONAL FEELINGS OF UNSTEADINESS: 1

## 2023-12-13 ASSESSMENT — PAIN SCALES - GENERAL: PAINLEVEL: 2

## 2023-12-13 NOTE — PROGRESS NOTES
Radiation Oncology Follow-Up    Patient Name:  Alysia Magdaleno  MRN:  [id number]  :  1950    Referring Provider: No ref. provider found  Primary Care Provider: Xi Dwyer MD  Care Team: Patient Care Team:  Xi Dwyer MD as PCP - General (Family Medicine)    Date of Service: 2023     SUBJECTIVE  History of Present Illness:   Alysia Magdaleno is a 73 y.o. female who was previously seen at the [facility] Department of Radiation Oncology for stage IA3 [id number] left lower lobe adenocarcinoma s/p definitive SBRT 55 Gy/5 fractions completed 23.    She has a history of seropositive rheumatoid arthritis and psoriasis on  methotrexate and sulfasalazine. Her methotrexate was held before and after her lung SBRT, and it was restarted in 10/2023 with Dr. Matthews.    Today, she feels her breathing is a little better than before her lung radiation. She completes most ADLs at home, and she uses a walker for walking long distances outside her home. She uses oxygen 2L NC at night, which is her baseline. She coughs up occasional mucus. She has had a few episodes where she noticed a burning sensation over her left lower chest, but these were minor and self-resolving, and it has not happened for about a month. She otherwise denies chest pain, new headaches, new vision changes, nausea/vomiting, or loss of appetite. She noticed a rash flared up on her arms when her methotrexate was stopped, but this is improving. She continues to have chronic joint pains and she is seeing pain management next Monday.    Treatment Rendered:   Left lung SBRT 55 Gy/5 fractions completed 23.      Review of Systems:   Review of Systems   All other systems reviewed and are negative.      Performance Status:   The Karnofsky performance scale today is 70, Cares for self; unable to carry on normal activity or to do active work (ECOG equivalent 1).        OBJECTIVE  Vital Signs:  /64 (BP  Location: Left arm, Patient Position: Sitting, BP Cuff Size: Large adult long)   Pulse 64   Temp 36.5 °C (97.7 °F) (Temporal)   Resp 20   Wt 99.6 kg (219 lb 9.6 oz)   SpO2 94%   BMI 42.89 kg/m²    Physical Exam:  Physical Exam  Constitutional:       General: She is not in acute distress.     Appearance: Normal appearance. She is obese.   HENT:      Head: Normocephalic and atraumatic.      Mouth/Throat:      Mouth: Mucous membranes are moist.      Pharynx: Oropharynx is clear. No oropharyngeal exudate or posterior oropharyngeal erythema.   Eyes:      General: No scleral icterus.     Extraocular Movements: Extraocular movements intact.      Conjunctiva/sclera: Conjunctivae normal.      Pupils: Pupils are equal, round, and reactive to light.   Cardiovascular:      Rate and Rhythm: Normal rate and regular rhythm.   Pulmonary:      Effort: Pulmonary effort is normal. No respiratory distress.      Breath sounds: No stridor. Rales present. No wheezing or rhonchi.      Comments: Mild bibasilar rales. Lungs otherwise clear to auscultation.  Chest:      Chest wall: No tenderness.   Musculoskeletal:         General: Normal range of motion.      Cervical back: Normal range of motion and neck supple.      Right lower leg: No edema.      Left lower leg: No edema.   Skin:     General: Skin is warm and dry.      Coloration: Skin is not jaundiced.      Findings: No lesion or rash.   Neurological:      General: No focal deficit present.      Mental Status: She is alert and oriented to person, place, and time.      Cranial Nerves: No cranial nerve deficit.      Sensory: No sensory deficit.      Motor: No weakness.      Coordination: Coordination normal.      Gait: Gait normal.   Psychiatric:         Mood and Affect: Mood normal.         Behavior: Behavior normal.             ASSESSMENT:  Alysia Magdaleno is a 73 y.o. female with stage IA3 [id number] left lower lobe adenocarcinoma s/p definitive SBRT 55 Gy/5 fractions completed  9/20/23.    Her breathing has been stable and she maintains fair performance status (ECOG 2, KPS 70). She continues to use oxygen at night (2L NC at baseline) and is following with heme/onc, rheumatology, and pulmonology.    She is scheduled for CT c/a/p with and without contrast on 1/22/24. I will see her for follow up in 4/2024.    NCCN Guidelines were applicable to guide this patients treatment plan.    Bakari Mead MD

## 2023-12-18 ENCOUNTER — OFFICE VISIT (OUTPATIENT)
Dept: PAIN MEDICINE | Facility: HOSPITAL | Age: 73
End: 2023-12-18
Payer: MEDICARE

## 2023-12-18 VITALS
RESPIRATION RATE: 16 BRPM | SYSTOLIC BLOOD PRESSURE: 130 MMHG | DIASTOLIC BLOOD PRESSURE: 54 MMHG | BODY MASS INDEX: 37.65 KG/M2 | HEART RATE: 72 BPM | HEIGHT: 64 IN | WEIGHT: 220.5 LBS

## 2023-12-18 DIAGNOSIS — M79.7 FIBROMYALGIA: Primary | ICD-10-CM

## 2023-12-18 PROCEDURE — 1036F TOBACCO NON-USER: CPT | Performed by: PHYSICAL MEDICINE & REHABILITATION

## 2023-12-18 PROCEDURE — 1160F RVW MEDS BY RX/DR IN RCRD: CPT | Performed by: PHYSICAL MEDICINE & REHABILITATION

## 2023-12-18 PROCEDURE — 3008F BODY MASS INDEX DOCD: CPT | Performed by: PHYSICAL MEDICINE & REHABILITATION

## 2023-12-18 PROCEDURE — 99214 OFFICE O/P EST MOD 30 MIN: CPT | Performed by: PHYSICAL MEDICINE & REHABILITATION

## 2023-12-18 PROCEDURE — 3078F DIAST BP <80 MM HG: CPT | Performed by: PHYSICAL MEDICINE & REHABILITATION

## 2023-12-18 PROCEDURE — 99204 OFFICE O/P NEW MOD 45 MIN: CPT | Performed by: PHYSICAL MEDICINE & REHABILITATION

## 2023-12-18 PROCEDURE — 1125F AMNT PAIN NOTED PAIN PRSNT: CPT | Performed by: PHYSICAL MEDICINE & REHABILITATION

## 2023-12-18 PROCEDURE — 3075F SYST BP GE 130 - 139MM HG: CPT | Performed by: PHYSICAL MEDICINE & REHABILITATION

## 2023-12-18 PROCEDURE — 1159F MED LIST DOCD IN RCRD: CPT | Performed by: PHYSICAL MEDICINE & REHABILITATION

## 2023-12-18 RX ORDER — DULOXETIN HYDROCHLORIDE 30 MG/1
30 CAPSULE, DELAYED RELEASE ORAL DAILY
Qty: 7 CAPSULE | Refills: 0 | Status: SHIPPED | OUTPATIENT
Start: 2023-12-18 | End: 2024-01-30 | Stop reason: ALTCHOICE

## 2023-12-18 RX ORDER — DULOXETIN HYDROCHLORIDE 60 MG/1
60 CAPSULE, DELAYED RELEASE ORAL DAILY
Qty: 30 CAPSULE | Refills: 2 | Status: SHIPPED | OUTPATIENT
Start: 2023-12-18

## 2023-12-18 SDOH — SOCIAL STABILITY: SOCIAL NETWORK: SOCIAL ACTIVITY:: 1

## 2023-12-18 ASSESSMENT — ENCOUNTER SYMPTOMS
LOSS OF SENSATION IN FEET: 1
OCCASIONAL FEELINGS OF UNSTEADINESS: 1
DEPRESSION: 0

## 2023-12-18 ASSESSMENT — PATIENT HEALTH QUESTIONNAIRE - PHQ9
1. LITTLE INTEREST OR PLEASURE IN DOING THINGS: NOT AT ALL
SUM OF ALL RESPONSES TO PHQ9 QUESTIONS 1 AND 2: 0
2. FEELING DOWN, DEPRESSED OR HOPELESS: NOT AT ALL

## 2023-12-18 ASSESSMENT — COLUMBIA-SUICIDE SEVERITY RATING SCALE - C-SSRS
2. HAVE YOU ACTUALLY HAD ANY THOUGHTS OF KILLING YOURSELF?: NO
1. IN THE PAST MONTH, HAVE YOU WISHED YOU WERE DEAD OR WISHED YOU COULD GO TO SLEEP AND NOT WAKE UP?: NO
6. HAVE YOU EVER DONE ANYTHING, STARTED TO DO ANYTHING, OR PREPARED TO DO ANYTHING TO END YOUR LIFE?: NO

## 2023-12-18 NOTE — PROGRESS NOTES
"Subjective   Patient ID: Alysia Magdaleno is a 73 y.o. female who presents for Back Pain.  HPI    Location of Pain: pt is here for interval follow up and med count. pt states she has low back pain that does not radiate except for occasional sciatic pain- reports occasional muscle spasms due to back pain. pt also has right hip, knee, and bilateral ankle pain. She has psoriatic arthritis, rheumatoid arthritis, and osteoarthritis. She also has neuropathy in feet. Patient reports that she's had generalized pain and a new lung CA diagnosis.The pain is \"constant\" but varies in severity and continues to be worsened with activities of daily living while relieved by prescription medication.       Pain Score: 9/10     Side Effects:denies     Other pain medication/neuromodulator: gabapentin 300mg 1cap tid, has consult for medical marijuana          Pain Disability Index  Family/Home Responsibilities:: 10  Recreation:: 10  Social Activity:: 1  Occupation:: 1  Sexual Behavior:: 1  Self Care:: 8  Life-Support Activities:: 10     Patient endorses diffuse body aches all over.  She said the pain has been constant for many years.  She says she previously was enrolled in aqua therapy but did not get any relief.        Monitoring and compliance:    ORT:    PDUQ:    Office Agreement:      Review of Systems   All other systems reviewed and are negative.       Current Outpatient Medications   Medication Instructions    albuterol (ProAir HFA) 90 mcg/actuation inhaler 2 puffs, inhalation, 4 times daily RT    apixaban (ELIQUIS) 5 mg, oral, 2 times daily    atorvastatin (Lipitor) 20 mg tablet 1 tablet, oral, Nightly    besifloxacin (Besivance) 0.6 % drops,suspension     Breztri Aerosphere 160-9-4.8 mcg/actuation HFA aerosol inhaler 2 puffs, inhalation, 2 times daily    bromfenac (Prolensa) 0.07 % drops     cloNIDine (CATAPRES) 0.1 mg, oral, Every 8 hours    cream base no.31, bulk, (Transdermal Pain Base) cream 1 Application, Topical, See admin " instructions, Apply as directed by physician    dexlansoprazole (DEXILANT) 60 mg, oral, Daily before breakfast    dicyclomine (Bentyl) 10 mg capsule take 1 capsule by mouth twice a day or up to four times a day if needed for ABDOMINAL CRAMPING    DULoxetine (CYMBALTA) 30 mg, oral, Daily, Do not crush or chew.    DULoxetine (CYMBALTA) 60 mg, oral, Daily, Do not crush or chew.    famotidine (Pepcid) 40 mg tablet take 1 tablet by mouth once daily    fexofenadine (ALLEGRA) 180 mg, oral, Daily    fluticasone propion-salmeteroL (Advair Diskus) 250-50 mcg/dose diskus inhaler Advair Diskus 250 mcg-50 mcg/dose powder for inhalation    fluticasone-umeclidin-vilanter (Trelegy Ellipta) 200-62.5-25 mcg blister with device INHALE 1 PUFF INTO THE LUNGS ONCE DAILY, RINSE MOUTH AFTER EACH USE WITH WATER AND THEN SPIT IT OUT    folic acid (Folvite) 1 mg tablet 1 tablet, oral, Daily    gabapentin (NEURONTIN) 300 mg, oral, 3 times daily    guaiFENesin (Mucinex) 600 mg 12 hr tablet oral    ipratropium-albuteroL (Duo-Neb) 0.5-2.5 mg/3 mL nebulizer solution 3 mL, inhalation, Every 4 hours PRN    Linzess 72 mcg capsule 1 capsule, oral, Daily    loteprednol etabonate (Lotemax SM) 0.38 % drops,gel     methocarbamol (ROBAXIN) 500 mg, oral, 2 times daily    methotrexate (Trexall) 2.5 mg tablet oral, Weekly    montelukast (Singulair) 10 mg tablet 1 tablet, oral, Daily    mupirocin (Bactroban) 2 % ointment Topical, As needed    naloxone (NARCAN) 4 mg, nasal, As needed    propafenone (RYTHMOL) 225 mg, oral, Every 8 hours    roflumilast (DALIRESP) 500 mcg, oral, Daily    sulfaSALAzine (AZULFIDINE) 500 mg, oral, 2 times daily    tapentadol (Nucynta) 75 mg tablet 1 tablet, oral, Daily    tiotropium (Spiriva with HandiHaler) 18 mcg inhalation capsule Spiriva with HandiHaler 18 mcg and inhalation capsules    traZODone (DESYREL) 50 mg, oral, Nightly PRN    triamcinolone (Kenalog) 0.1 % oral paste apply sparingly to affected area three times a day         Past Medical History:   Diagnosis Date    Hemorrhage of anus and rectum 09/19/2016    Rectal bleeding    Hemorrhage of anus and rectum 04/10/2017    Rectal bleeding    Other specified disorders of gingiva and edentulous alveolar ridge 04/10/2017    Pain in gums    Pain in left foot 03/22/2017    Left foot pain    Personal history of (healed) traumatic fracture     History of fracture of nasal bone    Personal history of leukemia     History of leukemia    Personal history of Methicillin resistant Staphylococcus aureus infection     History of methicillin resistant Staphylococcus aureus infection    Personal history of other diseases of the circulatory system     History of atrial fibrillation    Personal history of other diseases of the circulatory system     History of hypertension    Personal history of other diseases of the digestive system     History of irritable bowel syndrome    Personal history of other diseases of the digestive system     History of constipation    Personal history of other diseases of the musculoskeletal system and connective tissue     History of rheumatoid arthritis    Personal history of other diseases of the musculoskeletal system and connective tissue     History of chronic back pain    Personal history of other diseases of the musculoskeletal system and connective tissue 06/17/2019    History of arthritis    Personal history of other diseases of the nervous system and sense organs     History of glaucoma    Personal history of other diseases of the respiratory system     History of chronic obstructive lung disease    Personal history of other diseases of the respiratory system     H/O emphysema    Personal history of other diseases of the respiratory system     History of asthma    Personal history of other endocrine, nutritional and metabolic disease     History of hyperlipidemia    Personal history of other endocrine, nutritional and metabolic disease     History of  hyperthyroidism    Personal history of other endocrine, nutritional and metabolic disease     History of type 2 diabetes mellitus    Unspecified injury of right ankle, initial encounter 04/10/2017    Right ankle injury        Past Surgical History:   Procedure Laterality Date    CT GUIDED PERCUTANEOUS BIOPSY LUNG  7/19/2023    CT GUIDED PERCUTANEOUS BIOPSY LUNG 7/19/2023 [facility] CT    GALLBLADDER SURGERY  01/12/2016    Gallbladder Surgery    HIP SURGERY  01/12/2016    Hip Surgery    KNEE SURGERY  01/12/2016    Knee Surgery    OTHER SURGICAL HISTORY  04/22/2019    Throat surgery    OTHER SURGICAL HISTORY  09/18/2019    Surgery    OTHER SURGICAL HISTORY  09/18/2019    Iridotomy peripheral  laser    OTHER SURGICAL HISTORY  09/18/2019    Jaw surgery    OTHER SURGICAL HISTORY  09/18/2019    Cholecystectomy    OTHER SURGICAL HISTORY  09/18/2019    Hysterectomy        Family History   Problem Relation Name Age of Onset    Cancer Mother      Drug abuse Mother      Mental illness Mother      No Known Problems Father      Cancer Sister          Allergies   Allergen Reactions    Adhesive Unknown    Adhesive Tape-Silicones Itching    Aspirin Unknown and Hives    Hydrocodone-Acetaminophen Unknown     nausea    Morphine Hives    Naproxen Unknown    Niacin Unknown    Nsaids (Non-Steroidal Anti-Inflammatory Drug) Unknown    Opioids-Meperidine And Related Other    Tizanidine Unknown    Other Rash     gold plated metal        Objective     Vitals:    12/18/23 1512   BP: 130/54   Pulse: 72   Resp: 16        Physical Exam    PHYSICAL EXAM  Vitals signs reviewed  Constitutional:    General: Morbidly obese, not in acute distress   Appearance: Normal appearance. Not ill-appearing.  HENT:   Head: Normocephalic and atraumatic  Eyes:   Conjunctiva/sclera normal  Cardiovascular:  No jugular venous distention bilaterally  No gross edema in lower extremities  Pulmonary:   Effort: No respiratory distress  Abdominal:  Abdomen appears  nondistended  Musculoskeletal:   Pain to palpation in multiple body areas in the head and neck, trunk, proximal bilateral upper and lower extremities in accordance with ACR criteria.  Skin:   General: Skin is warm and dry  Neurological:   General: No focal deficit present  Ambulates with walker  Psychiatric:    Mood and Affect: Mood normal    Behavior: Behavior normal        Assessment/Plan   Problem List Items Addressed This Visit             ICD-10-CM    Fibromyalgia - Primary M79.7    Relevant Medications    DULoxetine (Cymbalta) 30 mg DR capsule    DULoxetine (Cymbalta) 60 mg DR capsule          Patient is a 73-year-old female the past medical history significant for morbid obesity, status post bilateral TKA and MARCO, CHF, COPD, Stage 1 adenocarcinoma of the lung, presenting with diffuse body aches.  Pain to palpation in multiple body areas in the head and neck, trunk, proximal bilateral upper and lower extremities in accordance with ACR criteria consistent with fibromyalgia diagnosis.  Counseled patient on the best treatment course for her fibromyalgia as below.  Will start duloxetine titration, follow-up in 6 weeks.  Consider increasing gabapentin dose in the future, as well as left genicular nerve block.    Plan:  - GFR 84.  Start duloxetine 30 mg - 60 mg nightly  - The patient was explained that the mainstay of managing fibromyalgia is to participate in low-impact aerobic exercising of 1 hour per day, 6 days a week. Examples of low impact aerobic exercising include swimming, riding a stationary bicycle, or exercising on an elliptical machine. Low impact aerobic exercising at that rate results in much better pain relief than any of the medications that could be prescribed for fibromyalgia and without side effects. The patient must be compliant, however, and must participate in such therapy for 12 weeks consecutively before noticing remarkable response. The patient was receptive to counseling and agrees to  continue to exercise toward this goal.      This note was generated with the aid of dictation software, there may be typos despite my attempts at proofreading.     I saw and evaluated the patient. I personally obtained the key and critical portions of the history and physical exam or was physically present for key and critical portions performed by the resident/fellow. I reviewed the resident/fellow's documentation and discussed the patient with the resident/fellow. I agree with the resident/fellow's medical decision making as documented in the note.    Jaden Randall MD

## 2023-12-21 DIAGNOSIS — M79.7 FIBROMYALGIA: ICD-10-CM

## 2023-12-21 RX ORDER — DULOXETIN HYDROCHLORIDE 30 MG/1
CAPSULE, DELAYED RELEASE ORAL
Qty: 7 CAPSULE | Refills: 0 | OUTPATIENT
Start: 2023-12-21

## 2023-12-26 ENCOUNTER — TELEPHONE (OUTPATIENT)
Dept: PRIMARY CARE | Facility: CLINIC | Age: 73
End: 2023-12-26
Payer: MEDICARE

## 2023-12-26 DIAGNOSIS — I48.91 ATRIAL FIBRILLATION BY ELECTROCARDIOGRAM (MULTI): ICD-10-CM

## 2023-12-26 NOTE — TELEPHONE ENCOUNTER
Rx Refill Request Telephone Encounter    Name:  Alysia Magdaleno  :  [id number]  Medication Name:  Eliquis  5 mg        Take 1 tablet by mouth 2 times a day  Specific Pharmacy location:  [facility]

## 2023-12-29 NOTE — RESULT ENCOUNTER NOTE
Thank you for getting your blood work- A1C showed you do have diabetes, which is diet controlled at 7.1%, and your thyroid is low on the second measurement. May I send medication for you to take for that? We should check again in 6 months.

## 2024-01-04 ENCOUNTER — TELEPHONE (OUTPATIENT)
Dept: PRIMARY CARE | Facility: CLINIC | Age: 74
End: 2024-01-04
Payer: MEDICARE

## 2024-01-04 DIAGNOSIS — J43.9 PULMONARY EMPHYSEMA, UNSPECIFIED EMPHYSEMA TYPE (MULTI): ICD-10-CM

## 2024-01-04 NOTE — TELEPHONE ENCOUNTER
Med refill     fluticasone-umeclidin-vilanter (Trelegy Ellipta) 200-62.5-25 mcg blister with device [[id number]]     [id number] - [place], [place] - [address] [address] [address]  Phone: [phone]  Fax: [phone]

## 2024-01-05 RX ORDER — FLUTICASONE FUROATE, UMECLIDINIUM BROMIDE AND VILANTEROL TRIFENATATE 200; 62.5; 25 UG/1; UG/1; UG/1
1 POWDER RESPIRATORY (INHALATION) DAILY
Qty: 28 EACH | Refills: 11 | Status: SHIPPED | OUTPATIENT
Start: 2024-01-05 | End: 2025-01-04

## 2024-01-08 ENCOUNTER — TELEPHONE (OUTPATIENT)
Dept: PRIMARY CARE | Facility: CLINIC | Age: 74
End: 2024-01-08
Payer: MEDICARE

## 2024-01-08 NOTE — TELEPHONE ENCOUNTER
----- Message from Xi Dwyer MD sent at 12/28/2023  9:58 PM EST -----  Thank you for getting your blood work- A1C showed you do have diabetes, which is diet controlled at 7.1%, and your thyroid is low on the second measurement. May I send medication for you to take for that? We should check again in 6 months.

## 2024-01-11 ENCOUNTER — APPOINTMENT (OUTPATIENT)
Dept: RHEUMATOLOGY | Facility: CLINIC | Age: 74
End: 2024-01-11
Payer: MEDICARE

## 2024-01-19 DIAGNOSIS — I48.91 ATRIAL FIBRILLATION BY ELECTROCARDIOGRAM (MULTI): ICD-10-CM

## 2024-01-19 NOTE — TELEPHONE ENCOUNTER
----- Message from Debo Schmidt sent at 1/19/2024  3:28 PM EST -----  Regarding: Med Refill  Patient called for refill of Eliquis 5 mg Twice Daily and sulfasalazine 500mg twice daily.  Please send to [facility].

## 2024-01-19 NOTE — TELEPHONE ENCOUNTER
Last Appointment: 8/7/23 with Dr. Rose  Next Appointment: 2/6/24 with Mary Gonzalez  Last Labs: 9/28/23 CBC/CMP  Last Refilled: 12/26/23 30 days 0 refills by PCP  6/26/23 90 days 3 refills sent--I called pharmacy and they never received this. No refills left    Age: 73  Weight: 100 kg on 12/18/23  Creatinine: 0.76 on 9/28/23      I called and spoke with patient and she has enough Eliquis to last to Monday. I let her know the Sulfasalazine isn't a cardiac medication and unable to refill.

## 2024-01-22 ENCOUNTER — APPOINTMENT (OUTPATIENT)
Dept: RADIOLOGY | Facility: HOSPITAL | Age: 74
End: 2024-01-22
Payer: MEDICARE

## 2024-01-22 ENCOUNTER — TELEPHONE (OUTPATIENT)
Dept: RHEUMATOLOGY | Facility: CLINIC | Age: 74
End: 2024-01-22
Payer: MEDICARE

## 2024-01-22 DIAGNOSIS — M05.79 RHEUMATOID ARTHRITIS INVOLVING MULTIPLE SITES WITH POSITIVE RHEUMATOID FACTOR (MULTI): Primary | ICD-10-CM

## 2024-01-22 DIAGNOSIS — M05.79 RHEUMATOID ARTHRITIS INVOLVING MULTIPLE SITES WITH POSITIVE RHEUMATOID FACTOR (MULTI): ICD-10-CM

## 2024-01-22 RX ORDER — SULFASALAZINE 500 MG/1
500 TABLET ORAL 2 TIMES DAILY
Qty: 60 TABLET | Refills: 0 | Status: SHIPPED | OUTPATIENT
Start: 2024-01-22 | End: 2024-03-18 | Stop reason: SDUPTHER

## 2024-01-22 NOTE — TELEPHONE ENCOUNTER
Patient called for a refill on     Sulfasalazine 500 mg #60 one tablet twice a day    [facility] [phone]

## 2024-01-23 RX ORDER — SULFASALAZINE 500 MG/1
500 TABLET ORAL 2 TIMES DAILY
Qty: 60 TABLET | Refills: 0 | OUTPATIENT
Start: 2024-01-23 | End: 2024-02-22

## 2024-01-29 ENCOUNTER — APPOINTMENT (OUTPATIENT)
Dept: HEMATOLOGY/ONCOLOGY | Facility: CLINIC | Age: 74
End: 2024-01-29
Payer: MEDICARE

## 2024-01-30 ENCOUNTER — OFFICE VISIT (OUTPATIENT)
Dept: PAIN MEDICINE | Facility: HOSPITAL | Age: 74
End: 2024-01-30
Payer: MEDICARE

## 2024-01-30 VITALS
HEART RATE: 72 BPM | RESPIRATION RATE: 16 BRPM | DIASTOLIC BLOOD PRESSURE: 70 MMHG | BODY MASS INDEX: 39.36 KG/M2 | SYSTOLIC BLOOD PRESSURE: 110 MMHG | HEIGHT: 62 IN | WEIGHT: 213.9 LBS

## 2024-01-30 DIAGNOSIS — M25.50 POLYARTHRALGIA: ICD-10-CM

## 2024-01-30 DIAGNOSIS — M79.7 FIBROMYALGIA: ICD-10-CM

## 2024-01-30 DIAGNOSIS — M79.2 NEUROPATHIC PAIN: ICD-10-CM

## 2024-01-30 DIAGNOSIS — M53.9 MULTILEVEL DEGENERATIVE DISC DISEASE: ICD-10-CM

## 2024-01-30 DIAGNOSIS — M15.9 POLYARTICULAR OSTEOARTHRITIS: ICD-10-CM

## 2024-01-30 DIAGNOSIS — M79.18 MYOFASCIAL PAIN SYNDROME: Primary | ICD-10-CM

## 2024-01-30 PROCEDURE — 1160F RVW MEDS BY RX/DR IN RCRD: CPT | Performed by: CLINICAL NURSE SPECIALIST

## 2024-01-30 PROCEDURE — 1159F MED LIST DOCD IN RCRD: CPT | Performed by: CLINICAL NURSE SPECIALIST

## 2024-01-30 PROCEDURE — 3074F SYST BP LT 130 MM HG: CPT | Performed by: CLINICAL NURSE SPECIALIST

## 2024-01-30 PROCEDURE — 3078F DIAST BP <80 MM HG: CPT | Performed by: CLINICAL NURSE SPECIALIST

## 2024-01-30 PROCEDURE — 99214 OFFICE O/P EST MOD 30 MIN: CPT | Performed by: CLINICAL NURSE SPECIALIST

## 2024-01-30 PROCEDURE — 1125F AMNT PAIN NOTED PAIN PRSNT: CPT | Performed by: CLINICAL NURSE SPECIALIST

## 2024-01-30 PROCEDURE — 3008F BODY MASS INDEX DOCD: CPT | Performed by: CLINICAL NURSE SPECIALIST

## 2024-01-30 PROCEDURE — 1036F TOBACCO NON-USER: CPT | Performed by: CLINICAL NURSE SPECIALIST

## 2024-01-30 RX ORDER — GABAPENTIN 300 MG/1
300 CAPSULE ORAL 3 TIMES DAILY
Qty: 90 CAPSULE | Refills: 5 | Status: SHIPPED | OUTPATIENT
Start: 2024-01-30 | End: 2024-05-14

## 2024-01-30 ASSESSMENT — ENCOUNTER SYMPTOMS
LOSS OF SENSATION IN FEET: 0
OCCASIONAL FEELINGS OF UNSTEADINESS: 0
DEPRESSION: 0

## 2024-01-30 NOTE — ASSESSMENT & PLAN NOTE
73-year-old female with past medical history significant for morbid obesity, status post bilateral TKA and MARCO, diabetes, A-fib, COPD, Stage 1 adenocarcinoma of the lung, presenting for follow-up.  Patient had diffuse bodyaches and polyarticular pain as well as low back pain at her last office visit.  She has noted significant improvement in the symptoms with the addition of duloxetine and continuing gabapentin.  She feels that her pain is well-managed with this regimen.  She has been able to increase her activity and increase her exercise regimen.  She has been able to make better progress with weight loss.  Reminded patient that the best treatment course for fibromyalgia is consistent exercise.  We will continue duloxetine 60 mg daily and gabapentin 300 mg 3 times daily.  She feels that the gabapentin has been very helpful for neuropathic pain to bilateral feet.  We have room to increase this dose in the future.  May consider left genicular nerve block in the future if needed.  Plan reviewed with patient at today's office visit.  -Continue duloxetine 60 mg daily.  Currently tolerating without adverse effects.  -Continue gabapentin 300 mg 3 times daily.  May increase this dose in the future if needed.  .The patient was counseled on the risks and potential side effects of gabapentin as well as its importance for dosage titration. Side effects included but were not limited to, drowsiness, sedation, cognitive decline, peripheral edema, weight gain, seizures, with abrupt withdrawal.  Patient was instructed to call the office with any concerns or side effects before abruptly stopping medication.   -Given the patient's report of fibromyalgia pain  I discussed with her in detail the recommendations most current with the [employer] in the treatment of fibromyalgia.  This includes most recent research showing that the most effective treatment for fibromyalgia is physical exercise.  Cognitive behavioral  therapy has also been shown to have positive effects towards pain and other symptoms related to fibromyalgia.  Adjuvant treatments including acupuncture chiropractic and massage therapy have also been beneficial.  Specifically related to medication the FDA has approved few medications directly for the treatment of fibromyalgia and these include Cymbalta and Savella as well as older medications including Elavil.  Medications outside of the serotonin and norepinephrine realms include muscle relaxants as well as Lyrica and gabapentin.  Most importantly the College's recommendation is to avoid opiate narcotic medication for treating fibromyalgia the evidence shows at these drugs are not helpful to most people with fibromyalgia and in fact will cause greater pain sensitivity or make pain persistent.  -Patient will follow-up in our office in 6 months or sooner if needed.

## 2024-01-30 NOTE — PROGRESS NOTES
Subjective   Patient ID: Alysia Magdaleno is a 73 y.o. female who presents for back pain and polyarticular pain/fibromyalgia    HPI  73-year-old female with history of chronic low back pain nonradiating and polyarticular pain presents for follow-up.  Polyarticular pain related to history of psoriatic arthritis rheumatoid arthritis and osteoarthritis.  She also has chronic neuropathy in bilateral feet related to history of diabetes.  Medical history also positive for new diagnosis of lung cancer, CLL, diabetes, atrial fibrillation, GERD, and COPD.  Patient is also status post multiple joint replacements including bilateral TKAs and MARCO.  Imaging from 2020 consistent with degenerative disc disease cervical through lumbar spine.  Previously maintained on opiates and weaned off secondary to ineffectiveness and persistent constipation.  Maintained on gabapentin 300 mg 3 times daily and added duloxetine at her last office visit.  Patient continued working on weight loss.  Previous physical therapy with consistent daily exercise.  Patient presents at today's office visit with significant improvement in total body/polyarticular pain as well as low back pain.  When she does have pain she states that it is aching and throbbing.  She feels that the addition of duloxetine has provided significant pain relief.  Continued benefit with gabapentin 300 mg 3 times daily.  She has noted improvement in neuropathy bilateral feet with both gabapentin and duloxetine.  She has been able to increase her activity level secondary to improvement in pain relief.  She has also noted improvement in constipation without opiate medication.  She has occasional constipation which she treats with Linzess.  Continues to work on weight loss.  Continues home exercises.    Location of Pain: pt is here for interval follow up and med count. pt states she has low back pain that does not radiate except for occasional sciatic pain- reports occasional muscle  spasms due to back pain.She has psoriatic arthritis, rheumatoid arthritis, and osteoarthritis. She also has neuropathy in feet. Patient reports that she's had generalized pain and a new lung CA diagnosis- had radiation- stated they got it all.       Pain Score: 0/10     Side Effects:denies     Other pain medication/neuromodulator: Duloxetine- works really well/Gabapentin-needs refill    OA 6/14/23    Currently on Ozempill for weight loss  OARRS:  No data recorded  I have personally reviewed the OARRS report for Alysia Magdaleno. I have considered the risks of abuse, dependence, addiction and diversion    Is the patient prescribed a combination of a benzodiazepine and opioid?  No    Last Urine Drug Screen / ordered today: No  Recent Results (from the past 8760 hour(s))   OPIATE/OPIOID/BENZO PRESCRIPTION COMPLIANCE    Collection Time: 02/23/23  3:48 PM   Result Value Ref Range    DRUG SCREEN COMMENT URINE SEE BELOW     Creatine, Urine 49.8 mg/dL    Amphetamine Screen, Urine PRESUMPTIVE NEGATIVE NEGATIVE    Barbiturate Screen, Urine PRESUMPTIVE NEGATIVE NEGATIVE    Cannabinoid Screen, Urine PRESUMPTIVE NEGATIVE NEGATIVE    Cocaine Screen, Urine PRESUMPTIVE NEGATIVE NEGATIVE    PCP Screen, Urine PRESUMPTIVE NEGATIVE NEGATIVE    7-Aminoclonazepam <25 Cutoff <25 ng/mL    Alpha-Hydroxyalprazolam <25 Cutoff <25 ng/mL    Alpha-Hydroxymidazolam <25 Cutoff <25 ng/mL    Alprazolam <25 Cutoff <25 ng/mL    Chlordiazepoxide <25 Cutoff <25 ng/mL    Clonazepam <25 Cutoff <25 ng/mL    Diazepam <25 Cutoff <25 ng/mL    Lorazepam <25 Cutoff <25 ng/mL    Midazolam <25 Cutoff <25 ng/mL    Nordiazepam <25 Cutoff <25 ng/mL    Oxazepam <25 Cutoff <25 ng/mL    Temazepam <25 Cutoff <25 ng/mL    Zolpidem <25 Cutoff <25 ng/mL    Zolpidem Metabolite (ZCA) <25 Cutoff <25 ng/mL    6-Acetylmorphine <25 Cutoff <25 ng/mL    Codeine <50 Cutoff <50 ng/mL    Hydrocodone <25 Cutoff <25 ng/mL    Hydromorphone <25 Cutoff <25 ng/mL    Morphine Urine <50 Cutoff  <50 ng/mL    Norhydrocodone <25 Cutoff <25 ng/mL    Noroxycodone <25 Cutoff <25 ng/mL    Oxycodone <25 Cutoff <25 ng/mL    Oxymorphone <25 Cutoff <25 ng/mL    Tramadol <50 Cutoff <50 ng/mL    O-Desmethyltramadol <50 Cutoff <50 ng/mL    Fentanyl <2.5 Cutoff<2.5 ng/mL    Norfentanyl <2.5 Cutoff<2.5 ng/mL    METHADONE CONFIRMATION,URINE <25 Cutoff <25 ng/mL    EDDP <25 Cutoff <25 ng/mL   Tapentadol Confirmation, Urine    Collection Time: 02/23/23  3:48 PM   Result Value Ref Range    Tapentadol >1000 (A) Cutoff <25 ng/mL    N-Desmethyltapentadol >1000 (A) Cutoff <25 ng/mL   Buprenorphine Confirm,Urine    Collection Time: 02/23/23  3:48 PM   Result Value Ref Range    BUPRENORPHINE GLUC, URINE <5 ng/mL    BUPRENORPHINE ,URINE <2 ng/mL    NALOXONE, URINE <100 ng/mL    NORBUPRENORPHINE GLUC,URINE <5 ng/mL    NORBUPRENORPHINE, URINE <2 ng/mL     N/A    Controlled Substance Agreement:  Date of the Last Agreement:       Monitoring and compliance:    ORT: NA    PDUQ: NA    Office Agreement:      Review of Systems    ROS:   General: No fevers, chills, weight loss  Skin: Negative for lesions  Eyes: No acute vision changes  Ears: No vertigo  Nose, mouth, throat: No difficulty swallowing or speaking  Respiratory: No cough, shortness of breath, cyanosis  Cardiovascular: Negative for chest pain syncope or palpitation  Gastrointestinal: No constipation, nausea, vomiting  Neurological: Negative for headache, positive for: Paresthesia bilateral feet  Psychological: Negative for severe or debilitating anxiety, depression. Negative memory loss  Musculoskeletal: Positive for arthralgia, myalgia and pain  Endocrine: Negative for weight gain, appetite changes, excessive sweating  Allergy/immune: Negative    All 13 systems were reviewed and are within normal levels except as noted or in the history of present illness.  Positive or pertinent negative responses are noted or were in the history of present illness. As noted, the patient denies  significant or impairing weakness in the bilateral upper and lower extremities, medication induced constipation, and bowel or bladder incontinence.     Current Outpatient Medications:     albuterol (ProAir HFA) 90 mcg/actuation inhaler, Inhale 2 puffs 4 times a day., Disp: , Rfl:     apixaban (Eliquis) 5 mg tablet, Take 1 tablet (5 mg) by mouth 2 times a day., Disp: 180 tablet, Rfl: 1    atorvastatin (Lipitor) 20 mg tablet, Take 1 tablet (20 mg) by mouth once daily at bedtime., Disp: , Rfl:     besifloxacin (Besivance) 0.6 % drops,suspension, , Disp: , Rfl:     Breztri Aerosphere 160-9-4.8 mcg/actuation HFA aerosol inhaler, Inhale 2 puffs 2 times a day., Disp: , Rfl:     bromfenac (Prolensa) 0.07 % drops, , Disp: , Rfl:     cloNIDine (Catapres) 0.1 mg tablet, Take 1 tablet (0.1 mg) by mouth every 8 hours., Disp: 270 tablet, Rfl: 3    cream base no.31, bulk, (Transdermal Pain Base) cream, Apply 1 Application topically see administration instructions. Apply as directed by physician, Disp: , Rfl:     dexlansoprazole (Dexilant) 60 mg DR capsule, Take 1 capsule (60 mg) by mouth once daily in the morning. Take before meals. (Patient not taking: Reported on 12/18/2023), Disp: 90 capsule, Rfl: 1    dicyclomine (Bentyl) 10 mg capsule, take 1 capsule by mouth twice a day or up to four times a day if needed for ABDOMINAL CRAMPING, Disp: , Rfl:     DULoxetine (Cymbalta) 30 mg DR capsule, Take 1 capsule (30 mg) by mouth once daily for 7 days. Do not crush or chew., Disp: 7 capsule, Rfl: 0    DULoxetine (Cymbalta) 60 mg DR capsule, Take 1 capsule (60 mg) by mouth once daily. Do not crush or chew., Disp: 30 capsule, Rfl: 2    famotidine (Pepcid) 40 mg tablet, take 1 tablet by mouth once daily (Patient not taking: Reported on 12/18/2023), Disp: 30 tablet, Rfl: 0    fexofenadine (Allegra) 180 mg tablet, Take 1 tablet (180 mg) by mouth once daily., Disp: , Rfl:     fluticasone propion-salmeteroL (Advair Diskus) 250-50 mcg/dose diskus  inhaler, Advair Diskus 250 mcg-50 mcg/dose powder for inhalation, Disp: , Rfl:     folic acid (Folvite) 1 mg tablet, Take 1 tablet (1 mg) by mouth once daily., Disp: , Rfl:     gabapentin (Neurontin) 300 mg capsule, Take 1 capsule (300 mg) by mouth 3 times a day., Disp: 90 capsule, Rfl: 2    guaiFENesin (Mucinex) 600 mg 12 hr tablet, Take by mouth., Disp: , Rfl:     ipratropium-albuteroL (Duo-Neb) 0.5-2.5 mg/3 mL nebulizer solution, Inhale 3 mL every 4 hours if needed., Disp: , Rfl:     Linzess 72 mcg capsule, Take 1 capsule (72 mcg) by mouth once daily., Disp: , Rfl:     loteprednol etabonate (Lotemax SM) 0.38 % drops,gel, , Disp: , Rfl:     methocarbamol (Robaxin) 500 mg tablet, Take 1 tablet (500 mg) by mouth 2 times a day., Disp: , Rfl:     methotrexate (Trexall) 2.5 mg tablet, Take by mouth 1 (one) time per week., Disp: , Rfl:     montelukast (Singulair) 10 mg tablet, Take 1 tablet (10 mg) by mouth once daily., Disp: , Rfl:     mupirocin (Bactroban) 2 % ointment, Apply topically. As needed, Disp: , Rfl:     naloxone (Narcan) 4 mg/0.1 mL nasal spray, Administer 1 spray (4 mg) into affected nostril(s) if needed., Disp: , Rfl:     propafenone (Rythmol) 225 mg tablet, Take 1 tablet (225 mg) by mouth every 8 hours., Disp: , Rfl:     roflumilast (Daliresp) 500 mcg tablet, Take 1 tablet (500 mcg) by mouth once daily. (Patient not taking: Reported on 12/18/2023), Disp: 90 tablet, Rfl: 3    sulfaSALAzine (Azulfidine) 500 mg tablet, Take 1 tablet (500 mg) by mouth 2 times a day., Disp: 60 tablet, Rfl: 0    tapentadol (Nucynta) 75 mg tablet, Take 1 tablet (75 mg) by mouth once daily., Disp: , Rfl:     tiotropium (Spiriva with HandiHaler) 18 mcg inhalation capsule, Spiriva with HandiHaler 18 mcg and inhalation capsules, Disp: , Rfl:     traZODone (Desyrel) 50 mg tablet, Take 1 tablet (50 mg) by mouth as needed at bedtime for sleep., Disp: 90 tablet, Rfl: 3    Trelegy Ellipta 200-62.5-25 mcg blister with device, Inhale 1  puff early in the morning.., Disp: 28 each, Rfl: 11    triamcinolone (Kenalog) 0.1 % oral paste, apply sparingly to affected area three times a day, Disp: , Rfl:      Past Medical History:   Diagnosis Date    Hemorrhage of anus and rectum 09/19/2016    Rectal bleeding    Hemorrhage of anus and rectum 04/10/2017    Rectal bleeding    Other specified disorders of gingiva and edentulous alveolar ridge 04/10/2017    Pain in gums    Pain in left foot 03/22/2017    Left foot pain    Personal history of (healed) traumatic fracture     History of fracture of nasal bone    Personal history of leukemia     History of leukemia    Personal history of Methicillin resistant Staphylococcus aureus infection     History of methicillin resistant Staphylococcus aureus infection    Personal history of other diseases of the circulatory system     History of atrial fibrillation    Personal history of other diseases of the circulatory system     History of hypertension    Personal history of other diseases of the digestive system     History of irritable bowel syndrome    Personal history of other diseases of the digestive system     History of constipation    Personal history of other diseases of the musculoskeletal system and connective tissue     History of rheumatoid arthritis    Personal history of other diseases of the musculoskeletal system and connective tissue     History of chronic back pain    Personal history of other diseases of the musculoskeletal system and connective tissue 06/17/2019    History of arthritis    Personal history of other diseases of the nervous system and sense organs     History of glaucoma    Personal history of other diseases of the respiratory system     History of chronic obstructive lung disease    Personal history of other diseases of the respiratory system     H/O emphysema    Personal history of other diseases of the respiratory system     History of asthma    Personal history of other endocrine,  nutritional and metabolic disease     History of hyperlipidemia    Personal history of other endocrine, nutritional and metabolic disease     History of hyperthyroidism    Personal history of other endocrine, nutritional and metabolic disease     History of type 2 diabetes mellitus    Unspecified injury of right ankle, initial encounter 04/10/2017    Right ankle injury        Past Surgical History:   Procedure Laterality Date    CT GUIDED PERCUTANEOUS BIOPSY LUNG  7/19/2023    CT GUIDED PERCUTANEOUS BIOPSY LUNG 7/19/2023 [facility] CT    GALLBLADDER SURGERY  01/12/2016    Gallbladder Surgery    HIP SURGERY  01/12/2016    Hip Surgery    KNEE SURGERY  01/12/2016    Knee Surgery    OTHER SURGICAL HISTORY  04/22/2019    Throat surgery    OTHER SURGICAL HISTORY  09/18/2019    Surgery    OTHER SURGICAL HISTORY  09/18/2019    Iridotomy peripheral  laser    OTHER SURGICAL HISTORY  09/18/2019    Jaw surgery    OTHER SURGICAL HISTORY  09/18/2019    Cholecystectomy    OTHER SURGICAL HISTORY  09/18/2019    Hysterectomy        Family History   Problem Relation Name Age of Onset    Cancer Mother      Drug abuse Mother      Mental illness Mother      No Known Problems Father      Cancer Sister          Allergies   Allergen Reactions    Adhesive Unknown    Adhesive Tape-Silicones Itching    Aspirin Unknown and Hives    Hydrocodone-Acetaminophen Unknown     nausea    Morphine Hives    Naproxen Unknown    Niacin Unknown    Nsaids (Non-Steroidal Anti-Inflammatory Drug) Unknown    Opioids-Meperidine And Related Other    Tizanidine Unknown    Other Rash     gold plated metal        Objective     Visit Vitals  Smoking Status Former        Physical Exam    PE:  General: Well-developed, well-nourished, no acute distress. The patient demonstrates no pain behavior, symptom magnification or overt drug-seeking behavior.  Eye: Pupils appropriate for room lighting  Neck/thyroid: No obvious goiter or enlargement of neck noted  Respiratory exam: Normal  respiratory effort, unlabored respiration. No accessory muscle use noted  Cardiac exam: Bilateral radial pulses intact  Abdominal: Nondistended  Spine, lumbar: The patient is able to rise from a seated to standing position without hesitancy, push off, or delay. Gait is grossly nonantalgic. Tenderness to paraspinous musculature is noted lower lumbar spine.  Myofascial tender points upper and lower extremities as well as neck/trunk improved.  Ortho: Minimal joint pain  Neurologic exam: Muscle strength is antigravity in all 4 extremities.  Psychiatric exam: Judgment and insight normal, affect normal, speech is fluent, affect appropriate, demonstrating no signs of hypersomnolence, sedation, or confusion          Assessment/Plan   Problem List Items Addressed This Visit             ICD-10-CM    Multilevel degenerative disc disease M53.9    Myofascial pain syndrome - Primary M79.18    Polyarthralgia M25.50    Polyarticular osteoarthritis M15.9    Fibromyalgia M79.7     73-year-old female with past medical history significant for morbid obesity, status post bilateral TKA and MARCO, diabetes, A-fib, COPD, Stage 1 adenocarcinoma of the lung, presenting for follow-up.  Patient had diffuse bodyaches and polyarticular pain as well as low back pain at her last office visit.  She has noted significant improvement in the symptoms with the addition of duloxetine and continuing gabapentin.  She feels that her pain is well-managed with this regimen.  She has been able to increase her activity and increase her exercise regimen.  She has been able to make better progress with weight loss.  Reminded patient that the best treatment course for fibromyalgia is consistent exercise.  We will continue duloxetine 60 mg daily and gabapentin 300 mg 3 times daily.  She feels that the gabapentin has been very helpful for neuropathic pain to bilateral feet.  We have room to increase this dose in the future.  May consider left genicular nerve block in  the future if needed.  Plan reviewed with patient at today's office visit.  -Continue duloxetine 60 mg daily.  Currently tolerating without adverse effects.  -Continue gabapentin 300 mg 3 times daily.  May increase this dose in the future if needed.  .The patient was counseled on the risks and potential side effects of gabapentin as well as its importance for dosage titration. Side effects included but were not limited to, drowsiness, sedation, cognitive decline, peripheral edema, weight gain, seizures, with abrupt withdrawal.  Patient was instructed to call the office with any concerns or side effects before abruptly stopping medication.   -Given the patient's report of fibromyalgia pain  I discussed with her in detail the recommendations most current with the [employer] in the treatment of fibromyalgia.  This includes most recent research showing that the most effective treatment for fibromyalgia is physical exercise.  Cognitive behavioral therapy has also been shown to have positive effects towards pain and other symptoms related to fibromyalgia.  Adjuvant treatments including acupuncture chiropractic and massage therapy have also been beneficial.  Specifically related to medication the FDA has approved few medications directly for the treatment of fibromyalgia and these include Cymbalta and Savella as well as older medications including Elavil.  Medications outside of the serotonin and norepinephrine realms include muscle relaxants as well as Lyrica and gabapentin.  Most importantly the College's recommendation is to avoid opiate narcotic medication for treating fibromyalgia the evidence shows at these drugs are not helpful to most people with fibromyalgia and in fact will cause greater pain sensitivity or make pain persistent.  -Patient will follow-up in our office in 6 months or sooner if needed.

## 2024-02-01 PROBLEM — J44.1 COPD EXACERBATION (MULTI): Status: ACTIVE | Noted: 2023-04-19

## 2024-02-05 ASSESSMENT — ENCOUNTER SYMPTOMS
PALPITATIONS: 0
HEMATOCHEZIA: 0
CHILLS: 0
ORTHOPNEA: 0
SHORTNESS OF BREATH: 0
FEVER: 0
ALTERED MENTAL STATUS: 0
WHEEZING: 0
HEMATURIA: 0
VOMITING: 0
NAUSEA: 0
NEAR-SYNCOPE: 0
COUGH: 0
DYSPNEA ON EXERTION: 1
SYNCOPE: 0
IRREGULAR HEARTBEAT: 0

## 2024-02-05 NOTE — PATIENT INSTRUCTIONS
Recommend Mediterranean style of eating  Continue current medications  Keep appointment with TYLOR Meneses in June 2024  Check fasting lab work June 2024  Follow-up with Dr. Rose in 6 months  If you have any questions or cardiac concerns, please call our office at [phone].

## 2024-02-05 NOTE — PROGRESS NOTES
Chief Complaint/Reason for Visit:  Follow-up 6 month cardiovascular follow up    History Of Present Illness:    Alysia Magdaleno is a 73 y.o. female that presents to the office for 6 month follow up.    Taking medications as prescribed.     PMH is significant for a-fib, HTN, CHF, CLL, COPD, depression, DM with diabetic neuropathy, emphysema/COPD (nocturnal O2), GERD, hyperlipidemia, LV dysfunction and reactive airway disease.     +chronic NORTH with minimal exertion, chronic intermittent BLE edema.     Past Medical History:  She has a past medical history of Adenocarcinoma, lung (CMS/HCC), Atrial fibrillation (CMS/HCC), Chronic diastolic (congestive) heart failure (CMS/HCC), CLL (chronic lymphocytic leukemia) (CMS/HCC), COPD (chronic obstructive pulmonary disease) (CMS/HCC), Hemorrhage of anus and rectum (09/19/2016), Hemorrhage of anus and rectum (04/10/2017), Hyperlipidemia, Neuropathy, Other specified disorders of gingiva and edentulous alveolar ridge (04/10/2017), Pain in left foot (03/22/2017), Personal history of (healed) traumatic fracture, Personal history of leukemia, Personal history of Methicillin resistant Staphylococcus aureus infection, Personal history of other diseases of the circulatory system, Personal history of other diseases of the circulatory system, Personal history of other diseases of the digestive system, Personal history of other diseases of the digestive system, Personal history of other diseases of the musculoskeletal system and connective tissue, Personal history of other diseases of the musculoskeletal system and connective tissue, Personal history of other diseases of the musculoskeletal system and connective tissue (06/17/2019), Personal history of other diseases of the nervous system and sense organs, Personal history of other diseases of the respiratory system, Personal history of other diseases of the respiratory system, Personal history of other diseases of the respiratory system,  Personal history of other endocrine, nutritional and metabolic disease, Personal history of other endocrine, nutritional and metabolic disease, Personal history of other endocrine, nutritional and metabolic disease, and Unspecified injury of right ankle, initial encounter (04/10/2017).    Past Surgical History:  She has a past surgical history that includes Hip surgery (01/12/2016); Gallbladder surgery (01/12/2016); Knee surgery (01/12/2016); Other surgical history (04/22/2019); Other surgical history (09/18/2019); Other surgical history (09/18/2019); Other surgical history (09/18/2019); Other surgical history (09/18/2019); Other surgical history (09/18/2019); and CT guided percutaneous biopsy lung (7/19/2023).      Social History:  She reports that she quit smoking about 8 years ago. Her smoking use included cigarettes. She has a 40.00 pack-year smoking history. She has been exposed to tobacco smoke. She has never used smokeless tobacco. She reports that she does not currently use alcohol. She reports that she does not use drugs.    Family History:  Family History   Problem Relation Name Age of Onset    Cancer Mother      Drug abuse Mother      Mental illness Mother      No Known Problems Father      Cancer Sister          Allergies:  Adhesive, Adhesive tape-silicones, Aspirin, Hydrocodone-acetaminophen, Morphine, Naproxen, Niacin, Nsaids (non-steroidal anti-inflammatory drug), Opioids-meperidine and related, Tizanidine, and Other    Review of Systems   Constitutional: Negative for chills and fever.   Cardiovascular:  Positive for dyspnea on exertion (chronic) and leg swelling (intermittent). Negative for chest pain, irregular heartbeat, near-syncope, orthopnea, palpitations and syncope.   Respiratory:  Negative for cough, shortness of breath and wheezing.    Gastrointestinal:  Positive for constipation (alternating constipation and diarrhea - states this is chronic and she has IBS), diarrhea and hemorrhoids  (intermittent bleeding). Negative for hematochezia, melena, nausea and vomiting.   Genitourinary:  Negative for hematuria.   Psychiatric/Behavioral:  Negative for altered mental status.        Objective      Vitals reviewed.   Constitutional:       Appearance: Not in distress.   Neck:      Vascular: No carotid bruit.   Pulmonary:      Effort: Pulmonary effort is normal.      Breath sounds: Normal breath sounds.   Cardiovascular:      PMI at left midclavicular line. Normal rate. Regular rhythm. S1 with normal intensity. S2 with normal intensity.       Murmurs: There is no murmur.   Edema:     Peripheral edema absent.   Abdominal:      General: Bowel sounds are normal.   Neurological:      Mental Status: Alert and oriented to person, place and time.         Current Outpatient Medications   Medication Instructions    albuterol (ProAir HFA) 90 mcg/actuation inhaler 2 puffs, inhalation, 4 times daily RT    apixaban (ELIQUIS) 5 mg, oral, 2 times daily    atorvastatin (LIPITOR) 20 mg, oral, Nightly    cloNIDine (CATAPRES) 0.1 mg, oral, Every 8 hours    dexlansoprazole (DEXILANT) 60 mg, oral, Daily before breakfast    DULoxetine (CYMBALTA) 60 mg, oral, Daily, Do not crush or chew.    famotidine (Pepcid) 40 mg tablet take 1 tablet by mouth once daily    fexofenadine (ALLEGRA) 180 mg, oral, Daily    folic acid (Folvite) 1 mg tablet 1 tablet, oral, Daily    gabapentin (NEURONTIN) 300 mg, oral, 3 times daily    guaiFENesin (MUCINEX) 1,200 mg, oral, Daily    Linzess 72 mcg capsule 1 capsule, oral, Daily    methotrexate (Trexall) 2.5 mg tablet 6 tablets, oral, Weekly    metoprolol succinate XL (TOPROL-XL) 50 mg, oral, Daily, Do not crush or chew.    mupirocin (Bactroban) 2 % ointment Topical, As needed    naloxone (NARCAN) 4 mg, nasal, As needed    NON FORMULARY 1 each, oral, Daily, Ozempill 800 mg daily    propafenone (RYTHMOL) 225 mg, oral, Every 8 hours    roflumilast (DALIRESP) 500 mcg, oral, Daily    simethicone (MYLICON)  "125 mg, oral, Every 6 hours PRN    sulfaSALAzine (AZULFIDINE) 500 mg, oral, 2 times daily    traZODone (DESYREL) 50 mg, oral, Nightly PRN    Trelegy Ellipta 200-62.5-25 mcg blister with device 1 puff, inhalation, Daily        Last Labs:  CBC -  Lab Results   Component Value Date    WBC 22.4 (H) 09/28/2023    HGB 11.6 (L) 09/28/2023    HCT 39.6 09/28/2023     (H) 09/28/2023     09/28/2023       RENAL FUNCTION PANEL -   Lab Results   Component Value Date    GLUCOSE 129 (H) 09/28/2023     (L) 09/28/2023    K 4.1 09/28/2023     09/28/2023    CO2 27 09/28/2023    ANIONGAP 11 09/28/2023    BUN 13 09/28/2023    CREATININE 0.76 09/28/2023    CALCIUM 8.9 09/28/2023    ALBUMIN 3.7 09/28/2023        CMP -  Lab Results   Component Value Date    CALCIUM 8.9 09/28/2023    PROT 6.2 (L) 09/28/2023    ALBUMIN 3.7 09/28/2023    AST 11 09/28/2023    ALT 9 09/28/2023    ALKPHOS 54 09/28/2023    BILITOT 0.4 09/28/2023       LIPID PANEL -   Lab Results   Component Value Date    CHOL 151 06/19/2023    TRIG 113 06/19/2023    HDL 71.7 06/19/2023    CHHDL 2.1 06/19/2023    LDLF 57 06/19/2023    VLDL 23 06/19/2023     No results found for: \"LDLCALC\"    Lab Results   Component Value Date    BNP 66 06/19/2023    HGBA1C 7.1 (H) 12/11/2023    HGBA1C 7.4 (A) 06/27/2023       Lab Results   Component Value Date    TSH 5.74 (H) 12/11/2023       No results found for this or any previous visit.     Last Cardiology Tests:    Stress test 10/12/2022 showed no clinical or electrocardiographic evidence for ischemia at maximal infusion. No ECG changes from baseline. Small fixed perfusion defect in the apical wall. No reversible perfusion defect seen. Low probability of ischemia. Calculated EF is 63%.     TTE 4/7/2022 showed LV systolic function is normal with an EF of 60 to 65%. Moderate reduced RV systolic function.    Visit Vitals  /64   Pulse 68   Ht 1.575 m (5' 2\")   Wt 96.6 kg (213 lb)   BMI 38.96 kg/m²   Smoking Status " Former   BSA 2.06 m²       Assessment/Plan   The primary encounter diagnosis was Atrial fibrillation by electrocardiogram (CMS/HCC). Diagnoses of Chronic diastolic heart failure (CMS/HCC), Coronary artery calcification, Hyperlipidemia, unspecified hyperlipidemia type, and Dyslipidemia were also pertinent to this visit.    1. Afib; aflutter s/p AFL RFA  [id number] score is 4  Continue apixaban 5 mg twice daily  Continue metoprolol succinate 50 mg daily   On propafenone 225 mg every 8 hours -  managed by EP, has f/u with TYLOR Meneses June 2024     2. Chronic diastolic heart failure  Compensated on exam  Continue metoprolol succinate 50 mg daily  TTE April 2022 with LVEF 60 to 65%     3. Coronary artery Calcification/Aortic calcification on CT chest  Need to achieve LDL goal <100   Stress test October 2022 with low probability of ischemia     4. Dyslipidemia  Goal LDL <100. She is at goal.   Continue atorvastatin 20 mg daily   Check CMP as ordered by oncology.    Check fasting lipid panel June 2024     5. COPD  Management per pulmonology - Dr. Connelly    6. Chronic lymphocytic leukemia  Management per hematology/oncology    7. Lung adenocarcinoma  Management per hematology/oncology - Dr. Mead - radiation oncologist and ADRIANA Jimenez, TERESE-CNP

## 2024-02-06 ENCOUNTER — OFFICE VISIT (OUTPATIENT)
Dept: CARDIOLOGY | Facility: CLINIC | Age: 74
End: 2024-02-06
Payer: MEDICARE

## 2024-02-06 VITALS
SYSTOLIC BLOOD PRESSURE: 110 MMHG | HEART RATE: 68 BPM | BODY MASS INDEX: 39.2 KG/M2 | DIASTOLIC BLOOD PRESSURE: 64 MMHG | WEIGHT: 213 LBS | HEIGHT: 62 IN

## 2024-02-06 DIAGNOSIS — I25.84 CORONARY ARTERY CALCIFICATION: ICD-10-CM

## 2024-02-06 DIAGNOSIS — I48.91 ATRIAL FIBRILLATION BY ELECTROCARDIOGRAM (MULTI): Primary | ICD-10-CM

## 2024-02-06 DIAGNOSIS — E78.5 HYPERLIPIDEMIA, UNSPECIFIED HYPERLIPIDEMIA TYPE: ICD-10-CM

## 2024-02-06 DIAGNOSIS — E78.5 DYSLIPIDEMIA: ICD-10-CM

## 2024-02-06 DIAGNOSIS — I50.32 CHRONIC DIASTOLIC HEART FAILURE (MULTI): ICD-10-CM

## 2024-02-06 DIAGNOSIS — I25.10 CORONARY ARTERY CALCIFICATION: ICD-10-CM

## 2024-02-06 PROCEDURE — 1160F RVW MEDS BY RX/DR IN RCRD: CPT | Performed by: NURSE PRACTITIONER

## 2024-02-06 PROCEDURE — 1159F MED LIST DOCD IN RCRD: CPT | Performed by: NURSE PRACTITIONER

## 2024-02-06 PROCEDURE — 3074F SYST BP LT 130 MM HG: CPT | Performed by: NURSE PRACTITIONER

## 2024-02-06 PROCEDURE — 3078F DIAST BP <80 MM HG: CPT | Performed by: NURSE PRACTITIONER

## 2024-02-06 PROCEDURE — 1125F AMNT PAIN NOTED PAIN PRSNT: CPT | Performed by: NURSE PRACTITIONER

## 2024-02-06 PROCEDURE — 1036F TOBACCO NON-USER: CPT | Performed by: NURSE PRACTITIONER

## 2024-02-06 PROCEDURE — 3008F BODY MASS INDEX DOCD: CPT | Performed by: NURSE PRACTITIONER

## 2024-02-06 PROCEDURE — 99214 OFFICE O/P EST MOD 30 MIN: CPT | Performed by: NURSE PRACTITIONER

## 2024-02-06 RX ORDER — SIMETHICONE 125 MG
125 TABLET,CHEWABLE ORAL EVERY 6 HOURS PRN
COMMUNITY
End: 2024-05-03 | Stop reason: WASHOUT

## 2024-02-06 RX ORDER — METOPROLOL SUCCINATE 50 MG/1
50 TABLET, EXTENDED RELEASE ORAL DAILY
COMMUNITY
End: 2024-02-06 | Stop reason: SDUPTHER

## 2024-02-06 RX ORDER — ATORVASTATIN CALCIUM 20 MG/1
20 TABLET, FILM COATED ORAL NIGHTLY
Qty: 90 TABLET | Refills: 1 | Status: SHIPPED | OUTPATIENT
Start: 2024-02-06 | End: 2024-08-04

## 2024-02-06 RX ORDER — METOPROLOL SUCCINATE 50 MG/1
50 TABLET, EXTENDED RELEASE ORAL DAILY
Qty: 90 TABLET | Refills: 3 | Status: SHIPPED | OUTPATIENT
Start: 2024-02-06 | End: 2025-02-05

## 2024-02-06 ASSESSMENT — ENCOUNTER SYMPTOMS
CONSTIPATION: 1
DIARRHEA: 1

## 2024-02-08 ENCOUNTER — APPOINTMENT (OUTPATIENT)
Dept: RHEUMATOLOGY | Facility: CLINIC | Age: 74
End: 2024-02-08
Payer: MEDICARE

## 2024-02-12 ENCOUNTER — HOSPITAL ENCOUNTER (OUTPATIENT)
Dept: RADIOLOGY | Facility: HOSPITAL | Age: 74
Discharge: HOME | End: 2024-02-12
Payer: MEDICARE

## 2024-02-12 DIAGNOSIS — K90.9 INTESTINAL MALABSORPTION, UNSPECIFIED (HHS-HCC): ICD-10-CM

## 2024-02-12 DIAGNOSIS — D50.9 IRON DEFICIENCY ANEMIA, UNSPECIFIED: ICD-10-CM

## 2024-02-12 DIAGNOSIS — C91.10 CHRONIC LYMPHOCYTIC LEUKEMIA OF B-CELL TYPE NOT HAVING ACHIEVED REMISSION (MULTI): ICD-10-CM

## 2024-02-12 LAB
CREAT SERPL-MCNC: 0.8 MG/DL (ref 0.6–1.3)
GFR SERPL CREATININE-BSD FRML MDRD: 82 ML/MIN/1.73M*2

## 2024-02-12 PROCEDURE — 74177 CT ABD & PELVIS W/CONTRAST: CPT

## 2024-02-12 PROCEDURE — 71260 CT THORAX DX C+: CPT | Performed by: RADIOLOGY

## 2024-02-12 PROCEDURE — 2550000001 HC RX 255 CONTRASTS: Performed by: PHYSICIAN ASSISTANT

## 2024-02-12 PROCEDURE — 82565 ASSAY OF CREATININE: CPT

## 2024-02-12 PROCEDURE — 74177 CT ABD & PELVIS W/CONTRAST: CPT | Performed by: RADIOLOGY

## 2024-02-12 RX ADMIN — IOHEXOL 75 ML: 350 INJECTION, SOLUTION INTRAVENOUS at 15:44

## 2024-02-19 ENCOUNTER — OFFICE VISIT (OUTPATIENT)
Dept: HEMATOLOGY/ONCOLOGY | Facility: CLINIC | Age: 74
End: 2024-02-19
Payer: MEDICARE

## 2024-02-19 VITALS
DIASTOLIC BLOOD PRESSURE: 68 MMHG | WEIGHT: 209.22 LBS | RESPIRATION RATE: 18 BRPM | SYSTOLIC BLOOD PRESSURE: 136 MMHG | BODY MASS INDEX: 38.27 KG/M2 | OXYGEN SATURATION: 93 % | HEART RATE: 64 BPM | TEMPERATURE: 97 F

## 2024-02-19 DIAGNOSIS — C91.10 CLL (CHRONIC LYMPHOCYTIC LEUKEMIA) (MULTI): ICD-10-CM

## 2024-02-19 LAB
ALBUMIN SERPL BCP-MCNC: 3.8 G/DL (ref 3.4–5)
ALP SERPL-CCNC: 65 U/L (ref 33–136)
ALT SERPL W P-5'-P-CCNC: 15 U/L (ref 7–45)
ANION GAP SERPL CALC-SCNC: 18 MMOL/L (ref 10–20)
AST SERPL W P-5'-P-CCNC: 19 U/L (ref 9–39)
BASOPHILS # BLD MANUAL: 0 X10*3/UL (ref 0–0.1)
BASOPHILS NFR BLD MANUAL: 0 %
BILIRUB SERPL-MCNC: 0.4 MG/DL (ref 0–1.2)
BUN SERPL-MCNC: 8 MG/DL (ref 6–23)
CALCIUM SERPL-MCNC: 8.9 MG/DL (ref 8.6–10.3)
CHLORIDE SERPL-SCNC: 101 MMOL/L (ref 98–107)
CO2 SERPL-SCNC: 24 MMOL/L (ref 21–32)
CREAT SERPL-MCNC: 0.76 MG/DL (ref 0.5–1.05)
EGFRCR SERPLBLD CKD-EPI 2021: 84 ML/MIN/1.73M*2
EOSINOPHIL # BLD MANUAL: 0.7 X10*3/UL (ref 0–0.4)
EOSINOPHIL NFR BLD MANUAL: 3 %
ERYTHROCYTE [DISTWIDTH] IN BLOOD BY AUTOMATED COUNT: 14.4 % (ref 11.5–14.5)
FERRITIN SERPL-MCNC: 161 NG/ML (ref 8–150)
GLUCOSE SERPL-MCNC: 115 MG/DL (ref 74–99)
HCT VFR BLD AUTO: 41.2 % (ref 36–46)
HGB BLD-MCNC: 12.3 G/DL (ref 12–16)
IMM GRANULOCYTES # BLD AUTO: 0.06 X10*3/UL (ref 0–0.5)
IMM GRANULOCYTES NFR BLD AUTO: 0.3 % (ref 0–0.9)
IRON SATN MFR SERPL: 30 % (ref 25–45)
IRON SERPL-MCNC: 79 UG/DL (ref 35–150)
LDH SERPL L TO P-CCNC: 219 U/L (ref 84–246)
LYMPHOCYTES # BLD MANUAL: 12.53 X10*3/UL (ref 0.8–3)
LYMPHOCYTES NFR BLD MANUAL: 54 %
MCH RBC QN AUTO: 34.1 PG (ref 26–34)
MCHC RBC AUTO-ENTMCNC: 29.9 G/DL (ref 32–36)
MCV RBC AUTO: 114 FL (ref 80–100)
MONOCYTES # BLD MANUAL: 0.7 X10*3/UL (ref 0.05–0.8)
MONOCYTES NFR BLD MANUAL: 3 %
NEUTS SEG # BLD MANUAL: 8.82 X10*3/UL (ref 1.6–5)
NEUTS SEG NFR BLD MANUAL: 38 %
NRBC BLD-RTO: ABNORMAL /100{WBCS}
OVALOCYTES BLD QL SMEAR: ABNORMAL
PATH REVIEW-CBC DIFFERENTIAL: NORMAL
PLATELET # BLD AUTO: 164 X10*3/UL (ref 150–450)
POTASSIUM SERPL-SCNC: 4.6 MMOL/L (ref 3.5–5.3)
PROT SERPL-MCNC: 6.2 G/DL (ref 6.4–8.2)
RBC # BLD AUTO: 3.61 X10*6/UL (ref 4–5.2)
RBC MORPH BLD: ABNORMAL
SODIUM SERPL-SCNC: 138 MMOL/L (ref 136–145)
TARGETS BLD QL SMEAR: ABNORMAL
TIBC SERPL-MCNC: 265 UG/DL (ref 240–445)
TOTAL CELLS COUNTED BLD: 100
UIBC SERPL-MCNC: 186 UG/DL (ref 110–370)
VARIANT LYMPHS # BLD MANUAL: 0.46 X10*3/UL (ref 0–0.3)
VARIANT LYMPHS NFR BLD: 2 %
WBC # BLD AUTO: 23.2 X10*3/UL (ref 4.4–11.3)

## 2024-02-19 PROCEDURE — 1036F TOBACCO NON-USER: CPT | Performed by: PHYSICIAN ASSISTANT

## 2024-02-19 PROCEDURE — 36415 COLL VENOUS BLD VENIPUNCTURE: CPT | Performed by: PHYSICIAN ASSISTANT

## 2024-02-19 PROCEDURE — 99214 OFFICE O/P EST MOD 30 MIN: CPT | Performed by: PHYSICIAN ASSISTANT

## 2024-02-19 PROCEDURE — 3008F BODY MASS INDEX DOCD: CPT | Performed by: PHYSICIAN ASSISTANT

## 2024-02-19 PROCEDURE — 1126F AMNT PAIN NOTED NONE PRSNT: CPT | Performed by: PHYSICIAN ASSISTANT

## 2024-02-19 PROCEDURE — 1159F MED LIST DOCD IN RCRD: CPT | Performed by: PHYSICIAN ASSISTANT

## 2024-02-19 PROCEDURE — 1160F RVW MEDS BY RX/DR IN RCRD: CPT | Performed by: PHYSICIAN ASSISTANT

## 2024-02-19 PROCEDURE — 3075F SYST BP GE 130 - 139MM HG: CPT | Performed by: PHYSICIAN ASSISTANT

## 2024-02-19 PROCEDURE — 3078F DIAST BP <80 MM HG: CPT | Performed by: PHYSICIAN ASSISTANT

## 2024-02-19 ASSESSMENT — PAIN SCALES - GENERAL: PAINLEVEL: 0-NO PAIN

## 2024-02-19 NOTE — PROGRESS NOTES
Visit Type: Follow Up Visit      Cancer History:   Treatment Synopsis:    73 year old woman with hx of CLL diagnosed since 2015 on surveillance and has not required treatment, smoking, COPD on oxygen, achalasia  by esophagogram, Afib, HTN, obesity, CHF, depression, DM with neuropathy, GERD, HL, IBS, OA, Rheumatoid arthritis who is referred for recently diagnosed lung adenocarcinoma.   The patient was followed with serial low dose CT scans Lung cancer screening   8/9/22 CT scan low dose showed a LIRADS 4b lesion suspicious in the LLL GGO possibly neoplastic vs inflammatory /infectious , and stable superior segment of LLL GGO, a 3 months follow up scan was recommended   1/14/23 CT low dose, interval increase in mixed solid and GGO in apical segment of the LLL measuring 2.3 x 1 cm increasing from 1.1 cm, interval development of a solid component measuring 0.7 cm, PET recommended, new 1 x 0.5 cm LLL opacity , additional  GGO   2/3/23 PET scan : GGO in the apical segment of LLL and CHELY are not FDG avid, low grade neoplastic process vs inflammatory   5/3/23 CT chest low dose: LIRADS 4X,S (very suspicious) lesion increased density of the apical segment LLL density, measured 2.6 cm from 2.3 cm and increased solid component measured 0.9 cm from 0.7 cm , CHELY GG density measuring up to 1.6 cm stable from  immediate prior but increased from previous could represent adenocarcinoma in situ, subsolid density measuring 1.1 cm in Right lung base, likely inflammatory   Referred to thoracic surgery   7/19/23: biopsy of LLL solid component of the density: invasive well differentiated adenoca, PDL1 < 1%, NGS showed KRAS G12C mutation   PFTs completed FEV1 81% and DLCO 44%, uses O2, deemed not a surgical candidate and referred to rad onc and med onc      Other Labs:   Chronic leukocytosis with lymphocytes predominance flow cytometry from 7/26 showed population CD5+CD23+ CD10- consistent with CLL, FISH, IGHV mutation status and NGS  lymphoid not ordered   Wbc stable 20-30K , hg from  10.2 g/dl, ferritin of 27 and Tsat of 9%         she was suffering from LE edema for months, it had improved finally, received dieretics and antibiotics   no DVT hx   she has chronic NORTH, has occasional cough, denies hemoptysis   she uses oxygen at night around 2 L/min   eating and drinking well   no nausea or vomiting   she gets today her second iron dose   no bleeding, no hematochezia or melena   has fatigue and tiredness   no weight loss   no fever or chills, no night sweats      PAST MEDICAL HISTORY:   CLL diagnosed since  on surveillance and has not required treatment, smoking, COPD on oxygen, achalasia by esophagogram, Afib, HTN, obesity, CHF, depression, DM with neuropathy, GERD,  HL, IBS, OA, Rheumatoid/psoriatic arthritis on MTX      SOCIAL HISTORY:   quit smoking 7 years ago, smoked 1 ppd x 40 yrs   no alcohol   has one daughter and one granddaughter   she worked in a factory/ machine equipment      FAMILY HISTORY:    mother  of colon cancer, sister had thyroid cancer   No other specific history of bleeding, clotting or malignant disorder in the family.     REVIEW OF SYSTEMS:  Pertinent finding as per the history above.  There are no additional specific symptoms pertaining to eyes, ENT, hematologic, lymphatic, neurological, psychiatric, cardiac,  pulmonary, GI, , endocrine, rheumatic, dermatological, or musculoskeletal systems.  All other systems have been reviewed and generally negative and noncontributory.     PHYSICAL EXAMINATION:   Constitutional: alert, awake and oriented, not in acute distress   HEENT: moist mucous membranes, normal nose   Neck: supple, no lymphadenopathy   EYES: PERRL, EOM intact, conjunctiva normal  Skin: no jaundice, rash or erythema  Neurological: AAOx3, no gross focal deficit   Psychiatric: normal mood and behavior   Lymph: no supraclavicular, axillary or inguinal LAD    History of Present Illness:    Patient  presents for follow up.  Overall reports feeling well with good energy. Denies SO/NORTH, cough, hemoptysis. Otherwise doing well.     Allergies and Intolerances:       Allergies:         Norco: Drug, Other, Active         aspirin: Drug, Hives/Urticaria, Bleeding, Active         Zanaflex: Drug, Hives/Urticaria, Active         niacin: Drug, Hives/Urticaria, Active         naproxen: Drug, Hives/Urticaria, Active         OxyContin: Drug, Other, Active         Vicodin: Drug, Other, Active         tramadol: Drug, Other, Active         gold plated metal: Environment, Rash, Active         Pollen: Environment, Unknown, Active     Current Outpatient Medications on File Prior to Visit   Medication Sig Dispense Refill    albuterol (ProAir HFA) 90 mcg/actuation inhaler Inhale 2 puffs 4 times a day.      apixaban (Eliquis) 5 mg tablet Take 1 tablet (5 mg) by mouth 2 times a day. 180 tablet 1    atorvastatin (Lipitor) 20 mg tablet Take 1 tablet (20 mg) by mouth once daily at bedtime. 90 tablet 1    cloNIDine (Catapres) 0.1 mg tablet Take 1 tablet (0.1 mg) by mouth every 8 hours. 270 tablet 3    dexlansoprazole (Dexilant) 60 mg DR capsule Take 1 capsule (60 mg) by mouth once daily in the morning. Take before meals. 90 capsule 1    DULoxetine (Cymbalta) 60 mg DR capsule Take 1 capsule (60 mg) by mouth once daily. Do not crush or chew. 30 capsule 2    fexofenadine (Allegra) 180 mg tablet Take 1 tablet (180 mg) by mouth once daily.      folic acid (Folvite) 1 mg tablet Take 1 tablet (1 mg) by mouth once daily.      gabapentin (Neurontin) 300 mg capsule Take 1 capsule (300 mg) by mouth 3 times a day. 90 capsule 5    Linzess 72 mcg capsule Take 1 capsule (72 mcg) by mouth once daily.      methotrexate (Trexall) 2.5 mg tablet Take 6 tablets (15 mg total) by mouth 1 (one) time per week.      metoprolol succinate XL (Toprol-XL) 50 mg 24 hr tablet Take 1 tablet (50 mg) by mouth once daily. Do not crush or chew. 90 tablet 3    naloxone  (Narcan) 4 mg/0.1 mL nasal spray Administer 1 spray (4 mg) into affected nostril(s) if needed.      NON FORMULARY Take 1 each by mouth early in the morning.. Ozempill 800 mg daily      propafenone (Rythmol) 225 mg tablet Take 1 tablet (225 mg) by mouth every 8 hours.      roflumilast (Daliresp) 500 mcg tablet Take 1 tablet (500 mcg) by mouth once daily. 90 tablet 3    simethicone (Mylicon) 125 mg chewable tablet Chew 1 tablet (125 mg) every 6 hours if needed for flatulence.      sulfaSALAzine (Azulfidine) 500 mg tablet Take 1 tablet (500 mg) by mouth 2 times a day. 60 tablet 0    traZODone (Desyrel) 50 mg tablet Take 1 tablet (50 mg) by mouth as needed at bedtime for sleep. 90 tablet 3    Trelegy Ellipta 200-62.5-25 mcg blister with device Inhale 1 puff early in the morning.. 28 each 11    famotidine (Pepcid) 40 mg tablet take 1 tablet by mouth once daily (Patient not taking: Reported on 2/19/2024) 30 tablet 0    [DISCONTINUED] guaiFENesin (Mucinex) 600 mg 12 hr tablet Take 2 tablets (1,200 mg) by mouth early in the morning..      [DISCONTINUED] mupirocin (Bactroban) 2 % ointment Apply topically. As needed       No current facility-administered medications on file prior to visit.      Medical History:         Iron deficiency anemia: ICD-10: D50.9, Status: Active         Shortness of breath on exertion: ICD-10: R06.02, Status:  Active         Coronary artery disease: ICD-10: I25.10, Status: Active         CLL (chronic lymphocytic leukemia): ICD-10: C91.10, Status:  Active     Family History: No Family History items are recorded  in the problem list.      Social History:   Social Substance History:  ·  Smoking Status never smoker (1)          Visit Vitals  /68 (BP Location: Right arm)   Pulse 64   Temp 36.1 °C (97 °F)   Resp 18   Wt 94.9 kg (209 lb 3.5 oz)   SpO2 93%   BMI 38.27 kg/m²   Smoking Status Former   BSA 2.04 m²     Labs:  Lab Results   Component Value Date    WBC 23.2 (H) 02/19/2024    NEUTROABS 7.66  "(H) 09/28/2023    IGABSOL 0.06 02/19/2024    LYMPHSABS 13.31 (H) 09/28/2023    MONOSABS 1.14 (H) 09/28/2023    EOSABS 0.70 (H) 02/19/2024    BASOSABS 0.00 02/19/2024    RBC 3.61 (L) 02/19/2024     (H) 02/19/2024    MCHC 29.9 (L) 02/19/2024    HGB 12.3 02/19/2024    HCT 41.2 02/19/2024     02/19/2024     No results found for: \"RETICCTPCT\"   Lab Results   Component Value Date    CREATININE 0.76 02/19/2024    BUN 8 02/19/2024    EGFR 84 02/19/2024     02/19/2024    K 4.6 02/19/2024     02/19/2024    CO2 24 02/19/2024      Lab Results   Component Value Date    ALT 15 02/19/2024    AST 19 02/19/2024    ALKPHOS 65 02/19/2024    BILITOT 0.4 02/19/2024      Lab Results   Component Value Date    TSH 5.74 (H) 12/11/2023     Lab Results   Component Value Date    TSH 5.74 (H) 12/11/2023     Lab Results   Component Value Date    IRON 79 02/19/2024    TIBC 265 02/19/2024    FERRITIN 161 (H) 02/19/2024      Lab Results   Component Value Date    OCGKULDU20 450 08/29/2023      Lab Results   Component Value Date    FOLATE >24.0 08/29/2023     Lab Results   Component Value Date    RF 94 (H) 08/08/2019    SEDRATE 15 07/13/2023      Lab Results   Component Value Date    CRP 1.12 (A) 07/13/2023      No results found for: \"BLOSSOM\"  Lab Results   Component Value Date     02/19/2024     No results found for: \"HAPTOGLOBIN\"  Lab Results   Component Value Date    SPEP NORMAL 07/26/2023     Lab Results   Component Value Date     11/28/2017    IGM 84 11/28/2017     11/28/2017       CT chest abdomen pelvis w IV contrast    Result Date: 2/13/2024 pericardial effusion. Atherosclerotic calcifications including the coronary arteries.   LUNGS, PLEURA, LARGE AIRWAYS:  Moderate emphysematous changes. Relatively dense/homogeneous opacity in the superior segment of the left lower lobe measuring 4.8 x 2.6 cm image 71/251 in the area of a vague 2.6 cm density on the previous exam. 1.6 cm glass opacity posteriorly " in the left upper lobe image 46/251 similar to the previous exam.. Mild linear and dependent densities in both lung bases. No significant pleural effusion. The central airways are patent.   BONES:  Degenerative endplate spurring in the thoracic spine.     ABDOMEN/PELVIS:       ABDOMINAL ORGANS:   LIVER: Prominent and mildly hypodense/fatty infiltrated. Slightly lobular contour. No discrete mass lesion   GALL BLADDER AND BILIARY TREE: Presumed cholecystectomy clips at the liver hilum. No significant biliary dilatation   SPLEEN: No focal lesion. Subcentimeter isodense presumed splenule adjacent to the hilum.   PANCREAS: No focal lesion   ADRENALS: No adrenal mass   KIDNEYS AND URETERS: Mild cortical thinning/scarring in the upper and lower poles of the right kidney. No focal renal mass or hydronephrosis within limits of nephrogram phase images.   BOWEL: No abnormally dilated large or small bowel loops. Dots of air in nondilated cecal appendix. Mild fluid distention of a few right lower quadrant small bowel loops. Mild distal colon diverticulosis.   PERITONEUM, RETROPERITONEUM, NODES: Cul-de-sac is partially obscured. No significant free fluid as visualized. No free intraperitoneal air. Scattered lymph nodes, more numerous than typically seen, include subcentimeter delmy hepatis and retroperitoneal nodes, 1 cm left common iliac node image 131/193.   VESSELS:  The abdominal aorta is normal in caliber. Multifocal atherosclerotic calcifications.   PELVIS: Artifact from bilateral hip arthroplasties limits evaluation. Urinary bladder is partially obscured but grossly normal in contour as visualized. The uterus is not definitively seen and may be absent, atrophic, or obscured.   ABDOMINAL WALL: No sizable abdominal wall hernia.   BONES: Bilateral hip arthroplasties in place. Extensive degenerative changes of the lumbar spine. Smooth L4 superior endplate depression with sclerotic margin consistent with Schmorl's node. No  definite focal concerning lytic or blastic osseous lesion.       CHEST:   Dense nearly 5 cm opacity in the superior segment of the left lower lobe significantly enlarged since 05/03/2023, presumably reflects the patient's known lung cancer. Clinical correlation and follow-up recommended.   Emphysematous changes and a small ground-glass nodule in the left upper lobe are similar to the previous exam.   ABDOMEN AND PELVIS:   Mild lymphadenopathy which could reflect patient's known CLL.   Mildly enlarged and likely hypodense/fatty infiltrated liver.   Mild distal colon diverticulosis.   Limited assessment of the pelvis due to artifact from bilateral hip arthroplasties.   Additional findings as described above.   MACRO: None.   Signed by: Yessica Goode 2/13/2024 4:10 PM Dictation workstation:   [id number]     Assessment/Plan:    1. Lung adenocarcinoma      cT1cN0 noting her disease is not FDG avid and she never had a regular CT chest   additional CHELY nodules/GGO changes of unclear significance but possibly inflammatory   not a surgical candidate   Completed SBRT to left lung 9/11/2023 - 9/20/2023  No current indication for chemo     2/19/2024: CT c/a/p c/f  disease progression; plan for PET/CT and rebiopsy as needed.    2. CLL   her anemia is related to iron deficiency and less likely CLL; s/p 3 doses of Venofer with improvement of H/H.  her wbc has been overall stable     3. Anemia   secondary to iron deficiency   s/p IV iron with good response  had colonoscopy last year with polyps detected      RTC with PET/CT imaging and labs  Plan:    Patient verbalized understanding, and all her questions were answered to her satisfaction.   40 min spent with patient greater than 50 % of which was spent in consultation, counselling and coordination of care.

## 2024-02-25 ENCOUNTER — DOCUMENTATION (OUTPATIENT)
Dept: OCCUPATIONAL THERAPY | Facility: HOSPITAL | Age: 74
End: 2024-02-25
Payer: MEDICARE

## 2024-02-25 NOTE — PROGRESS NOTES
Occupational Therapy                 Therapy Communication Note    Patient Name: Alysia Magdaleno  MRN: [id number]  Today's Date: 2/25/2024     Discipline: Occupational Therapy    Pt was seen for the initial evaluation on 10/2/23.  Pt failed to return for follow up.  Refer to the initial evaluation for details.

## 2024-02-27 DIAGNOSIS — I48.91 ATRIAL FIBRILLATION BY ELECTROCARDIOGRAM (MULTI): Primary | ICD-10-CM

## 2024-02-27 RX ORDER — PROPAFENONE HYDROCHLORIDE 225 MG/1
225 TABLET, COATED ORAL EVERY 8 HOURS
Qty: 270 TABLET | Refills: 1 | Status: SHIPPED | OUTPATIENT
Start: 2024-02-27

## 2024-03-09 DIAGNOSIS — M79.7 FIBROMYALGIA: ICD-10-CM

## 2024-03-11 RX ORDER — DULOXETIN HYDROCHLORIDE 60 MG/1
60 CAPSULE, DELAYED RELEASE ORAL DAILY
Qty: 30 CAPSULE | Refills: 2 | OUTPATIENT
Start: 2024-03-11

## 2024-03-14 ENCOUNTER — HOSPITAL ENCOUNTER (OUTPATIENT)
Dept: RADIOLOGY | Facility: HOSPITAL | Age: 74
Discharge: HOME | End: 2024-03-14
Payer: MEDICARE

## 2024-03-14 DIAGNOSIS — C91.10 CLL (CHRONIC LYMPHOCYTIC LEUKEMIA) (MULTI): ICD-10-CM

## 2024-03-14 PROCEDURE — 3430000001 HC RX 343 DIAGNOSTIC RADIOPHARMACEUTICALS: Mod: MUE | Performed by: PHYSICIAN ASSISTANT

## 2024-03-14 PROCEDURE — 78815 PET IMAGE W/CT SKULL-THIGH: CPT | Mod: PET TUMOR SUBSQ TX STRATEGY | Performed by: STUDENT IN AN ORGANIZED HEALTH CARE EDUCATION/TRAINING PROGRAM

## 2024-03-14 PROCEDURE — 78815 PET IMAGE W/CT SKULL-THIGH: CPT | Mod: PS

## 2024-03-14 PROCEDURE — A9552 F18 FDG: HCPCS | Mod: MUE | Performed by: PHYSICIAN ASSISTANT

## 2024-03-14 RX ORDER — FLUDEOXYGLUCOSE F 18 200 MCI/ML
13.9 INJECTION, SOLUTION INTRAVENOUS
Status: COMPLETED | OUTPATIENT
Start: 2024-03-14 | End: 2024-03-14

## 2024-03-14 RX ADMIN — FLUDEOXYGLUCOSE F 18 13.9 MILLICURIE: 200 INJECTION, SOLUTION INTRAVENOUS at 14:29

## 2024-03-18 ENCOUNTER — APPOINTMENT (OUTPATIENT)
Dept: PULMONOLOGY | Facility: CLINIC | Age: 74
End: 2024-03-18
Payer: MEDICARE

## 2024-03-18 DIAGNOSIS — M05.79 RHEUMATOID ARTHRITIS INVOLVING MULTIPLE SITES WITH POSITIVE RHEUMATOID FACTOR (MULTI): ICD-10-CM

## 2024-03-18 RX ORDER — SULFASALAZINE 500 MG/1
500 TABLET ORAL 2 TIMES DAILY
Qty: 14 TABLET | Refills: 0 | Status: SHIPPED | OUTPATIENT
Start: 2024-03-18 | End: 2024-06-06 | Stop reason: WASHOUT

## 2024-03-20 ENCOUNTER — TELEMEDICINE (OUTPATIENT)
Dept: HEMATOLOGY/ONCOLOGY | Facility: CLINIC | Age: 74
End: 2024-03-20
Payer: MEDICARE

## 2024-03-20 DIAGNOSIS — C91.10 CLL (CHRONIC LYMPHOCYTIC LEUKEMIA) (MULTI): ICD-10-CM

## 2024-03-20 PROCEDURE — 1160F RVW MEDS BY RX/DR IN RCRD: CPT | Performed by: PHYSICIAN ASSISTANT

## 2024-03-20 PROCEDURE — 3008F BODY MASS INDEX DOCD: CPT | Performed by: PHYSICIAN ASSISTANT

## 2024-03-20 PROCEDURE — 1036F TOBACCO NON-USER: CPT | Performed by: PHYSICIAN ASSISTANT

## 2024-03-20 PROCEDURE — 99214 OFFICE O/P EST MOD 30 MIN: CPT | Performed by: PHYSICIAN ASSISTANT

## 2024-03-20 PROCEDURE — 1159F MED LIST DOCD IN RCRD: CPT | Performed by: PHYSICIAN ASSISTANT

## 2024-03-20 NOTE — PROGRESS NOTES
Visit Type: Benign Heme Follow-up, Virtual Visit:  A Virtual visit (telephone only) between the patient (at the originating site) and the provider (at the distant site) was utilized to provide this telehealth service.   Verbal Consent for Encounter: Verbal consent was requested and obtained from patient, or from parent/guardian if minor, on this date for a telehealth visit.     Cancer History:   Treatment Synopsis:    73 year old woman with hx of CLL diagnosed since 2015 on surveillance and has not required treatment, smoking, COPD on oxygen, achalasia  by esophagogram, Afib, HTN, obesity, CHF, depression, DM with neuropathy, GERD, HL, IBS, OA, Rheumatoid arthritis who is referred for recently diagnosed lung adenocarcinoma.   The patient was followed with serial low dose CT scans Lung cancer screening   8/9/22 CT scan low dose showed a LIRADS 4b lesion suspicious in the LLL GGO possibly neoplastic vs inflammatory /infectious , and stable superior segment of LLL GGO, a 3 months follow up scan was recommended   1/14/23 CT low dose, interval increase in mixed solid and GGO in apical segment of the LLL measuring 2.3 x 1 cm increasing from 1.1 cm, interval development of a solid component measuring 0.7 cm, PET recommended, new 1 x 0.5 cm LLL opacity , additional  GGO   2/3/23 PET scan : GGO in the apical segment of LLL and CHELY are not FDG avid, low grade neoplastic process vs inflammatory   5/3/23 CT chest low dose: LIRADS 4X,S (very suspicious) lesion increased density of the apical segment LLL density, measured 2.6 cm from 2.3 cm and increased solid component measured 0.9 cm from 0.7 cm , CHELY GG density measuring up to 1.6 cm stable from  immediate prior but increased from previous could represent adenocarcinoma in situ, subsolid density measuring 1.1 cm in Right lung base, likely inflammatory   Referred to thoracic surgery   7/19/23: biopsy of LLL solid component of the density: invasive well differentiated adenoca,  PDL1 < 1%, NGS showed KRAS G12C mutation   PFTs completed FEV1 81% and DLCO 44%, uses O2, deemed not a surgical candidate and referred to rad onc and med onc      Other Labs:   Chronic leukocytosis with lymphocytes predominance flow cytometry from  showed population CD5+CD23+ CD10- consistent with CLL, FISH, IGHV mutation status and NGS lymphoid not ordered   Wbc stable 20-30K , hg from  10.2 g/dl, ferritin of 27 and Tsat of 9%         she was suffering from LE edema for months, it had improved finally, received dieretics and antibiotics   no DVT hx   she has chronic NORTH, has occasional cough, denies hemoptysis   she uses oxygen at night around 2 L/min   eating and drinking well   no nausea or vomiting   she gets today her second iron dose   no bleeding, no hematochezia or melena   has fatigue and tiredness   no weight loss   no fever or chills, no night sweats      PAST MEDICAL HISTORY:   CLL diagnosed since 2015 on surveillance and has not required treatment, smoking, COPD on oxygen, achalasia by esophagogram, Afib, HTN, obesity, CHF, depression, DM with neuropathy, GERD,  HL, IBS, OA, Rheumatoid/psoriatic arthritis on MTX      SOCIAL HISTORY:   quit smoking 7 years ago, smoked 1 ppd x 40 yrs   no alcohol   has one daughter and one granddaughter   she worked in a factory/ machine equipment      FAMILY HISTORY:    mother  of colon cancer, sister had thyroid cancer   No other specific history of bleeding, clotting or malignant disorder in the family.     REVIEW OF SYSTEMS:  Pertinent finding as per the history above.  There are no additional specific symptoms pertaining to eyes, ENT, hematologic, lymphatic, neurological, psychiatric, cardiac,  pulmonary, GI, , endocrine, rheumatic, dermatological, or musculoskeletal systems.  All other systems have been reviewed and generally negative and noncontributory.     PHYSICAL EXAMINATION:   Constitutional: alert, awake and oriented, not in acute distress    HEENT: moist mucous membranes, normal nose   Neck: supple, no lymphadenopathy   EYES: PERRL, EOM intact, conjunctiva normal  Skin: no jaundice, rash or erythema  Neurological: AAOx3, no gross focal deficit   Psychiatric: normal mood and behavior   Lymph: no supraclavicular, axillary or inguinal LAD    History of Present Illness:    Patient presents for follow up.  Overall reports feeling well with good energy. Notes that she is having more constipation notes that she has to double up on her Linzess. Otherwise doing well. Denies SO/NORTH, cough, hemoptysis. Otherwise doing well.     Allergies and Intolerances:       Allergies:         Norco: Drug, Other, Active         aspirin: Drug, Hives/Urticaria, Bleeding, Active         Zanaflex: Drug, Hives/Urticaria, Active         niacin: Drug, Hives/Urticaria, Active         naproxen: Drug, Hives/Urticaria, Active         OxyContin: Drug, Other, Active         Vicodin: Drug, Other, Active         tramadol: Drug, Other, Active         gold plated metal: Environment, Rash, Active         Pollen: Environment, Unknown, Active     Current Outpatient Medications on File Prior to Visit   Medication Sig Dispense Refill    albuterol (ProAir HFA) 90 mcg/actuation inhaler Inhale 2 puffs 4 times a day.      apixaban (Eliquis) 5 mg tablet Take 1 tablet (5 mg) by mouth 2 times a day. 180 tablet 1    atorvastatin (Lipitor) 20 mg tablet Take 1 tablet (20 mg) by mouth once daily at bedtime. 90 tablet 1    cloNIDine (Catapres) 0.1 mg tablet Take 1 tablet (0.1 mg) by mouth every 8 hours. 270 tablet 3    dexlansoprazole (Dexilant) 60 mg DR capsule Take 1 capsule (60 mg) by mouth once daily in the morning. Take before meals. 90 capsule 1    DULoxetine (Cymbalta) 60 mg DR capsule Take 1 capsule (60 mg) by mouth once daily. Do not crush or chew. 30 capsule 2    famotidine (Pepcid) 40 mg tablet take 1 tablet by mouth once daily (Patient not taking: Reported on 2/19/2024) 30 tablet 0    fexofenadine  (Allegra) 180 mg tablet Take 1 tablet (180 mg) by mouth once daily.      folic acid (Folvite) 1 mg tablet Take 1 tablet (1 mg) by mouth once daily.      gabapentin (Neurontin) 300 mg capsule Take 1 capsule (300 mg) by mouth 3 times a day. 90 capsule 5    Linzess 72 mcg capsule Take 1 capsule (72 mcg) by mouth once daily.      methotrexate (Trexall) 2.5 mg tablet Take 6 tablets (15 mg total) by mouth 1 (one) time per week.      metoprolol succinate XL (Toprol-XL) 50 mg 24 hr tablet Take 1 tablet (50 mg) by mouth once daily. Do not crush or chew. 90 tablet 3    naloxone (Narcan) 4 mg/0.1 mL nasal spray Administer 1 spray (4 mg) into affected nostril(s) if needed.      NON FORMULARY Take 1 each by mouth early in the morning.. Ozempill 800 mg daily      propafenone (Rythmol) 225 mg tablet Take 1 tablet (225 mg) by mouth every 8 hours. 270 tablet 1    roflumilast (Daliresp) 500 mcg tablet Take 1 tablet (500 mcg) by mouth once daily. 90 tablet 3    simethicone (Mylicon) 125 mg chewable tablet Chew 1 tablet (125 mg) every 6 hours if needed for flatulence.      sulfaSALAzine (Azulfidine) 500 mg tablet Take 1 tablet (500 mg) by mouth 2 times a day for 7 days. 14 tablet 0    traZODone (Desyrel) 50 mg tablet Take 1 tablet (50 mg) by mouth as needed at bedtime for sleep. 90 tablet 3    Trelegy Ellipta 200-62.5-25 mcg blister with device Inhale 1 puff early in the morning.. 28 each 11    [DISCONTINUED] sulfaSALAzine (Azulfidine) 500 mg tablet Take 1 tablet (500 mg) by mouth 2 times a day. 60 tablet 0     No current facility-administered medications on file prior to visit.      Medical History:         Iron deficiency anemia: ICD-10: D50.9, Status: Active         Shortness of breath on exertion: ICD-10: R06.02, Status:  Active         Coronary artery disease: ICD-10: I25.10, Status: Active         CLL (chronic lymphocytic leukemia): ICD-10: C91.10, Status:  Active     Family History: No Family History items are recorded  in the  "problem list.      Social History:   Social Substance History:  ·  Smoking Status never smoker (1)          Visit Vitals  Smoking Status Former     Labs:  Lab Results   Component Value Date    WBC 23.2 (H) 02/19/2024    NEUTROABS 7.66 (H) 09/28/2023    IGABSOL 0.06 02/19/2024    LYMPHSABS 13.31 (H) 09/28/2023    MONOSABS 1.14 (H) 09/28/2023    EOSABS 0.70 (H) 02/19/2024    BASOSABS 0.00 02/19/2024    RBC 3.61 (L) 02/19/2024     (H) 02/19/2024    MCHC 29.9 (L) 02/19/2024    HGB 12.3 02/19/2024    HCT 41.2 02/19/2024     02/19/2024     No results found for: \"RETICCTPCT\"   Lab Results   Component Value Date    CREATININE 0.76 02/19/2024    BUN 8 02/19/2024    EGFR 84 02/19/2024     02/19/2024    K 4.6 02/19/2024     02/19/2024    CO2 24 02/19/2024      Lab Results   Component Value Date    ALT 15 02/19/2024    AST 19 02/19/2024    ALKPHOS 65 02/19/2024    BILITOT 0.4 02/19/2024      Lab Results   Component Value Date    TSH 5.74 (H) 12/11/2023     Lab Results   Component Value Date    TSH 5.74 (H) 12/11/2023     Lab Results   Component Value Date    IRON 79 02/19/2024    TIBC 265 02/19/2024    FERRITIN 161 (H) 02/19/2024      Lab Results   Component Value Date    CZVDTICB19 450 08/29/2023      Lab Results   Component Value Date    FOLATE >24.0 08/29/2023     Lab Results   Component Value Date    RF 94 (H) 08/08/2019    SEDRATE 15 07/13/2023      Lab Results   Component Value Date    CRP 1.12 (A) 07/13/2023      No results found for: \"BLOSSOM\"  Lab Results   Component Value Date     02/19/2024     No results found for: \"HAPTOGLOBIN\"  Lab Results   Component Value Date    SPEP NORMAL 07/26/2023     Lab Results   Component Value Date     11/28/2017    IGM 84 11/28/2017     11/28/2017       CT chest abdomen pelvis w IV contrast    Result Date: 2/13/2024 pericardial effusion. Atherosclerotic calcifications including the coronary arteries.   LUNGS, PLEURA, LARGE AIRWAYS:  Moderate " emphysematous changes. Relatively dense/homogeneous opacity in the superior segment of the left lower lobe measuring 4.8 x 2.6 cm image 71/251 in the area of a vague 2.6 cm density on the previous exam. 1.6 cm glass opacity posteriorly in the left upper lobe image 46/251 similar to the previous exam.. Mild linear and dependent densities in both lung bases. No significant pleural effusion. The central airways are patent.   BONES:  Degenerative endplate spurring in the thoracic spine.     ABDOMEN/PELVIS:       ABDOMINAL ORGANS:   LIVER: Prominent and mildly hypodense/fatty infiltrated. Slightly lobular contour. No discrete mass lesion   GALL BLADDER AND BILIARY TREE: Presumed cholecystectomy clips at the liver hilum. No significant biliary dilatation   SPLEEN: No focal lesion. Subcentimeter isodense presumed splenule adjacent to the hilum.   PANCREAS: No focal lesion   ADRENALS: No adrenal mass   KIDNEYS AND URETERS: Mild cortical thinning/scarring in the upper and lower poles of the right kidney. No focal renal mass or hydronephrosis within limits of nephrogram phase images.   BOWEL: No abnormally dilated large or small bowel loops. Dots of air in nondilated cecal appendix. Mild fluid distention of a few right lower quadrant small bowel loops. Mild distal colon diverticulosis.   PERITONEUM, RETROPERITONEUM, NODES: Cul-de-sac is partially obscured. No significant free fluid as visualized. No free intraperitoneal air. Scattered lymph nodes, more numerous than typically seen, include subcentimeter delmy hepatis and retroperitoneal nodes, 1 cm left common iliac node image 131/193.   VESSELS:  The abdominal aorta is normal in caliber. Multifocal atherosclerotic calcifications.   PELVIS: Artifact from bilateral hip arthroplasties limits evaluation. Urinary bladder is partially obscured but grossly normal in contour as visualized. The uterus is not definitively seen and may be absent, atrophic, or obscured.   ABDOMINAL  WALL: No sizable abdominal wall hernia.   BONES: Bilateral hip arthroplasties in place. Extensive degenerative changes of the lumbar spine. Smooth L4 superior endplate depression with sclerotic margin consistent with Schmorl's node. No definite focal concerning lytic or blastic osseous lesion.       CHEST:   Dense nearly 5 cm opacity in the superior segment of the left lower lobe significantly enlarged since 05/03/2023, presumably reflects the patient's known lung cancer. Clinical correlation and follow-up recommended.   Emphysematous changes and a small ground-glass nodule in the left upper lobe are similar to the previous exam.   ABDOMEN AND PELVIS:   Mild lymphadenopathy which could reflect patient's known CLL.   Mildly enlarged and likely hypodense/fatty infiltrated liver.   Mild distal colon diverticulosis.   Limited assessment of the pelvis due to artifact from bilateral hip arthroplasties.   Additional findings as described above.   MACRO: None.   Signed by: Yessica Goode 2/13/2024 4:10 PM Dictation workstation:   [id number]     NM PET CT lung CA staging    Result Date: 3/16/2024  Interpreted By:  Annette Garcia and Maltbie Grace STUDY: NM PET CT LUNG CA STAGING;  3/14/2024 3:46 pm   INDICATION: Signs/Symptoms:lung cancer now w/increased LLL nodule on recent CT scan.   Patient is a 73-year-old lady with CLL 1st diagnosed in 2015 on surveillance and adenocarcinoma of the left lower lobe 1st diagnosed July 2023 status post SBRT September 2023, now presenting for restaging scan.   COMPARISON: PET-CT 02/03/2023 CT chest abdomen pelvis 02/12/2024   ACCESSION NUMBER(S): [id number]   ORDERING CLINICIAN: SOREN KING   TECHNIQUE: TECHNIQUE DIVISION OF NUCLEAR MEDICINE POSITRON EMISSION TOMOGRAPHY (PET-CT)   The patient received an intravenous dose of 13.9 mCi of Fluorine-18 fluorodeoxyglucose (FDG). Positron emission tomographic (PET) images from skull base to the mid thighs were then acquired after a one hour delay.  Also acquired was a contemporaneous low dose non-contrast CT scan performed for attenuation correction of PET images and anatomic localization. The PET and CT images were digitally fused for display. All images were acquired on a combined PET-CT scanner unit. Some areas of FDG accumulation may be described in standardized uptake value (SUV) units.   CODING: Subsequent Treatment Strategy (PS)   CALIBRATION: Dose Injection-to-Scan Interval (mins): 64 min Mediastinal bloodpool SUV (normal 1.5-2.5): 3.3 Blood glucose: 154 mg/dL   FINDINGS: HEAD AND NECK: *No evidence of focal hypermetabolic lesion in the brain parenchyma, noting that evaluation is limited because of the expected physiologic diffuse FDG uptake in the brain. *No evidence of paranasal sinus disease. *Thyroid gland is unremarkable. *FDG avidity in bilateral palatine tonsils, likely reactive. *No enlarged or FDG avid cervical lymphadenopathy is present.   CHEST: *Masslike consolidation with focal hypermetabolic activity in the superior segment left lower lobe has increased in size, density, and metabolic activity as compared to prior exam (SUV 5.1, previously 1.4). Focal hypermetabolic activity is seen within the chest wall immediately posterior to this lesion (SUV 4.4). *No enlarged or FDG avid mediastinal, hilar or axillary lymphadenopathy. *Both breasts are unremarkable.   ABDOMEN AND PELVIS: *No FDG avid lymphadenopathy in the abdomen and pelvis. *Both adrenal glands are unremarkable. *Physiologic radiotracer uptake is present in the liver and spleen with excretion into the bowel loops and the genitourinary tract. Status post cholecystectomy   MUSCULOSKELETAL/EXTREMITIES: * No concerning FDG avid bone lesion throughout the axial and appendicular skeleton. * Bilateral hip arthroplasties are noted.       1. Masslike consolidation with focal FDG activity in the superior segment left lower lobe has increased in size, density, and metabolic activity as  compared to prior exam. Findings likely reflect known lung cancer with superimposed postradiation changes. Focal FDG activity within the chest wall immediately posterior to this lesion with adjacent cortical thickening of the left 5th rib, for which local invasion is not excluded. 2. No PET evidence of msua or distant metastasis.   I personally reviewed the images/study and I agree with the findings as stated by Nuclear Medicine fellow Jessie Velasquez MD. This study was interpreted at [facility], [place], [place].   MACRO: None   Signed by: Annette Garcia 3/16/2024 11:29 AM Dictation workstation:   [id number]       Assessment/Plan:    1. Lung adenocarcinoma      cT1cN0 noting her disease is not FDG avid and she never had a regular CT chest   additional CHELY nodules/GGO changes of unclear significance but possibly inflammatory   not a surgical candidate   Completed SBRT to left lung 9/11/2023 - 9/20/2023  No current indication for chemo     2/19/2024: CT c/a/p c/f  disease progression; plan for PET/CT and rebiopsy as needed.    3/20/2024: Discussed concern for disease recurrence vs radiation affect so will send to IR for re biopsy. Discussed talking to her PCP/GI about constipation and medication that she is on for weight loss (Ozempill, OTC)    2. CLL   her anemia is related to iron deficiency and less likely CLL; s/p 3 doses of Venofer with improvement of H/H.  her wbc has been overall stable     3. Anemia   secondary to iron deficiency   s/p IV iron with good response  had colonoscopy last year with polyps detected      RTC after biopsy    Patient verbalized understanding, and all her questions were answered to her satisfaction    30 min spent with patient greater than 50 % of which was spent in consultation and coordination of care via telehealth

## 2024-03-20 NOTE — H&P (VIEW-ONLY)
Visit Type: Benign Heme Follow-up, Virtual Visit:  A Virtual visit (telephone only) between the patient (at the originating site) and the provider (at the distant site) was utilized to provide this telehealth service.   Verbal Consent for Encounter: Verbal consent was requested and obtained from patient, or from parent/guardian if minor, on this date for a telehealth visit.     Cancer History:   Treatment Synopsis:    73 year old woman with hx of CLL diagnosed since 2015 on surveillance and has not required treatment, smoking, COPD on oxygen, achalasia  by esophagogram, Afib, HTN, obesity, CHF, depression, DM with neuropathy, GERD, HL, IBS, OA, Rheumatoid arthritis who is referred for recently diagnosed lung adenocarcinoma.   The patient was followed with serial low dose CT scans Lung cancer screening   8/9/22 CT scan low dose showed a LIRADS 4b lesion suspicious in the LLL GGO possibly neoplastic vs inflammatory /infectious , and stable superior segment of LLL GGO, a 3 months follow up scan was recommended   1/14/23 CT low dose, interval increase in mixed solid and GGO in apical segment of the LLL measuring 2.3 x 1 cm increasing from 1.1 cm, interval development of a solid component measuring 0.7 cm, PET recommended, new 1 x 0.5 cm LLL opacity , additional  GGO   2/3/23 PET scan : GGO in the apical segment of LLL and CHELY are not FDG avid, low grade neoplastic process vs inflammatory   5/3/23 CT chest low dose: LIRADS 4X,S (very suspicious) lesion increased density of the apical segment LLL density, measured 2.6 cm from 2.3 cm and increased solid component measured 0.9 cm from 0.7 cm , CHELY GG density measuring up to 1.6 cm stable from  immediate prior but increased from previous could represent adenocarcinoma in situ, subsolid density measuring 1.1 cm in Right lung base, likely inflammatory   Referred to thoracic surgery   7/19/23: biopsy of LLL solid component of the density: invasive well differentiated adenoca,  PDL1 < 1%, NGS showed KRAS G12C mutation   PFTs completed FEV1 81% and DLCO 44%, uses O2, deemed not a surgical candidate and referred to rad onc and med onc      Other Labs:   Chronic leukocytosis with lymphocytes predominance flow cytometry from  showed population CD5+CD23+ CD10- consistent with CLL, FISH, IGHV mutation status and NGS lymphoid not ordered   Wbc stable 20-30K , hg from  10.2 g/dl, ferritin of 27 and Tsat of 9%         she was suffering from LE edema for months, it had improved finally, received dieretics and antibiotics   no DVT hx   she has chronic NORTH, has occasional cough, denies hemoptysis   she uses oxygen at night around 2 L/min   eating and drinking well   no nausea or vomiting   she gets today her second iron dose   no bleeding, no hematochezia or melena   has fatigue and tiredness   no weight loss   no fever or chills, no night sweats      PAST MEDICAL HISTORY:   CLL diagnosed since 2015 on surveillance and has not required treatment, smoking, COPD on oxygen, achalasia by esophagogram, Afib, HTN, obesity, CHF, depression, DM with neuropathy, GERD,  HL, IBS, OA, Rheumatoid/psoriatic arthritis on MTX      SOCIAL HISTORY:   quit smoking 7 years ago, smoked 1 ppd x 40 yrs   no alcohol   has one daughter and one granddaughter   she worked in a factory/ machine equipment      FAMILY HISTORY:    mother  of colon cancer, sister had thyroid cancer   No other specific history of bleeding, clotting or malignant disorder in the family.     REVIEW OF SYSTEMS:  Pertinent finding as per the history above.  There are no additional specific symptoms pertaining to eyes, ENT, hematologic, lymphatic, neurological, psychiatric, cardiac,  pulmonary, GI, , endocrine, rheumatic, dermatological, or musculoskeletal systems.  All other systems have been reviewed and generally negative and noncontributory.     PHYSICAL EXAMINATION:   Constitutional: alert, awake and oriented, not in acute distress    HEENT: moist mucous membranes, normal nose   Neck: supple, no lymphadenopathy   EYES: PERRL, EOM intact, conjunctiva normal  Skin: no jaundice, rash or erythema  Neurological: AAOx3, no gross focal deficit   Psychiatric: normal mood and behavior   Lymph: no supraclavicular, axillary or inguinal LAD    History of Present Illness:    Patient presents for follow up.  Overall reports feeling well with good energy. Notes that she is having more constipation notes that she has to double up on her Linzess. Otherwise doing well. Denies SO/NORTH, cough, hemoptysis. Otherwise doing well.     Allergies and Intolerances:       Allergies:         Norco: Drug, Other, Active         aspirin: Drug, Hives/Urticaria, Bleeding, Active         Zanaflex: Drug, Hives/Urticaria, Active         niacin: Drug, Hives/Urticaria, Active         naproxen: Drug, Hives/Urticaria, Active         OxyContin: Drug, Other, Active         Vicodin: Drug, Other, Active         tramadol: Drug, Other, Active         gold plated metal: Environment, Rash, Active         Pollen: Environment, Unknown, Active     Current Outpatient Medications on File Prior to Visit   Medication Sig Dispense Refill    albuterol (ProAir HFA) 90 mcg/actuation inhaler Inhale 2 puffs 4 times a day.      apixaban (Eliquis) 5 mg tablet Take 1 tablet (5 mg) by mouth 2 times a day. 180 tablet 1    atorvastatin (Lipitor) 20 mg tablet Take 1 tablet (20 mg) by mouth once daily at bedtime. 90 tablet 1    cloNIDine (Catapres) 0.1 mg tablet Take 1 tablet (0.1 mg) by mouth every 8 hours. 270 tablet 3    dexlansoprazole (Dexilant) 60 mg DR capsule Take 1 capsule (60 mg) by mouth once daily in the morning. Take before meals. 90 capsule 1    DULoxetine (Cymbalta) 60 mg DR capsule Take 1 capsule (60 mg) by mouth once daily. Do not crush or chew. 30 capsule 2    famotidine (Pepcid) 40 mg tablet take 1 tablet by mouth once daily (Patient not taking: Reported on 2/19/2024) 30 tablet 0    fexofenadine  (Allegra) 180 mg tablet Take 1 tablet (180 mg) by mouth once daily.      folic acid (Folvite) 1 mg tablet Take 1 tablet (1 mg) by mouth once daily.      gabapentin (Neurontin) 300 mg capsule Take 1 capsule (300 mg) by mouth 3 times a day. 90 capsule 5    Linzess 72 mcg capsule Take 1 capsule (72 mcg) by mouth once daily.      methotrexate (Trexall) 2.5 mg tablet Take 6 tablets (15 mg total) by mouth 1 (one) time per week.      metoprolol succinate XL (Toprol-XL) 50 mg 24 hr tablet Take 1 tablet (50 mg) by mouth once daily. Do not crush or chew. 90 tablet 3    naloxone (Narcan) 4 mg/0.1 mL nasal spray Administer 1 spray (4 mg) into affected nostril(s) if needed.      NON FORMULARY Take 1 each by mouth early in the morning.. Ozempill 800 mg daily      propafenone (Rythmol) 225 mg tablet Take 1 tablet (225 mg) by mouth every 8 hours. 270 tablet 1    roflumilast (Daliresp) 500 mcg tablet Take 1 tablet (500 mcg) by mouth once daily. 90 tablet 3    simethicone (Mylicon) 125 mg chewable tablet Chew 1 tablet (125 mg) every 6 hours if needed for flatulence.      sulfaSALAzine (Azulfidine) 500 mg tablet Take 1 tablet (500 mg) by mouth 2 times a day for 7 days. 14 tablet 0    traZODone (Desyrel) 50 mg tablet Take 1 tablet (50 mg) by mouth as needed at bedtime for sleep. 90 tablet 3    Trelegy Ellipta 200-62.5-25 mcg blister with device Inhale 1 puff early in the morning.. 28 each 11    [DISCONTINUED] sulfaSALAzine (Azulfidine) 500 mg tablet Take 1 tablet (500 mg) by mouth 2 times a day. 60 tablet 0     No current facility-administered medications on file prior to visit.      Medical History:         Iron deficiency anemia: ICD-10: D50.9, Status: Active         Shortness of breath on exertion: ICD-10: R06.02, Status:  Active         Coronary artery disease: ICD-10: I25.10, Status: Active         CLL (chronic lymphocytic leukemia): ICD-10: C91.10, Status:  Active     Family History: No Family History items are recorded  in the  "problem list.      Social History:   Social Substance History:  ·  Smoking Status never smoker (1)          Visit Vitals  Smoking Status Former     Labs:  Lab Results   Component Value Date    WBC 23.2 (H) 02/19/2024    NEUTROABS 7.66 (H) 09/28/2023    IGABSOL 0.06 02/19/2024    LYMPHSABS 13.31 (H) 09/28/2023    MONOSABS 1.14 (H) 09/28/2023    EOSABS 0.70 (H) 02/19/2024    BASOSABS 0.00 02/19/2024    RBC 3.61 (L) 02/19/2024     (H) 02/19/2024    MCHC 29.9 (L) 02/19/2024    HGB 12.3 02/19/2024    HCT 41.2 02/19/2024     02/19/2024     No results found for: \"RETICCTPCT\"   Lab Results   Component Value Date    CREATININE 0.76 02/19/2024    BUN 8 02/19/2024    EGFR 84 02/19/2024     02/19/2024    K 4.6 02/19/2024     02/19/2024    CO2 24 02/19/2024      Lab Results   Component Value Date    ALT 15 02/19/2024    AST 19 02/19/2024    ALKPHOS 65 02/19/2024    BILITOT 0.4 02/19/2024      Lab Results   Component Value Date    TSH 5.74 (H) 12/11/2023     Lab Results   Component Value Date    TSH 5.74 (H) 12/11/2023     Lab Results   Component Value Date    IRON 79 02/19/2024    TIBC 265 02/19/2024    FERRITIN 161 (H) 02/19/2024      Lab Results   Component Value Date    PRBREZJW27 450 08/29/2023      Lab Results   Component Value Date    FOLATE >24.0 08/29/2023     Lab Results   Component Value Date    RF 94 (H) 08/08/2019    SEDRATE 15 07/13/2023      Lab Results   Component Value Date    CRP 1.12 (A) 07/13/2023      No results found for: \"BLOSSOM\"  Lab Results   Component Value Date     02/19/2024     No results found for: \"HAPTOGLOBIN\"  Lab Results   Component Value Date    SPEP NORMAL 07/26/2023     Lab Results   Component Value Date     11/28/2017    IGM 84 11/28/2017     11/28/2017       CT chest abdomen pelvis w IV contrast    Result Date: 2/13/2024 pericardial effusion. Atherosclerotic calcifications including the coronary arteries.   LUNGS, PLEURA, LARGE AIRWAYS:  Moderate " emphysematous changes. Relatively dense/homogeneous opacity in the superior segment of the left lower lobe measuring 4.8 x 2.6 cm image 71/251 in the area of a vague 2.6 cm density on the previous exam. 1.6 cm glass opacity posteriorly in the left upper lobe image 46/251 similar to the previous exam.. Mild linear and dependent densities in both lung bases. No significant pleural effusion. The central airways are patent.   BONES:  Degenerative endplate spurring in the thoracic spine.     ABDOMEN/PELVIS:       ABDOMINAL ORGANS:   LIVER: Prominent and mildly hypodense/fatty infiltrated. Slightly lobular contour. No discrete mass lesion   GALL BLADDER AND BILIARY TREE: Presumed cholecystectomy clips at the liver hilum. No significant biliary dilatation   SPLEEN: No focal lesion. Subcentimeter isodense presumed splenule adjacent to the hilum.   PANCREAS: No focal lesion   ADRENALS: No adrenal mass   KIDNEYS AND URETERS: Mild cortical thinning/scarring in the upper and lower poles of the right kidney. No focal renal mass or hydronephrosis within limits of nephrogram phase images.   BOWEL: No abnormally dilated large or small bowel loops. Dots of air in nondilated cecal appendix. Mild fluid distention of a few right lower quadrant small bowel loops. Mild distal colon diverticulosis.   PERITONEUM, RETROPERITONEUM, NODES: Cul-de-sac is partially obscured. No significant free fluid as visualized. No free intraperitoneal air. Scattered lymph nodes, more numerous than typically seen, include subcentimeter delmy hepatis and retroperitoneal nodes, 1 cm left common iliac node image 131/193.   VESSELS:  The abdominal aorta is normal in caliber. Multifocal atherosclerotic calcifications.   PELVIS: Artifact from bilateral hip arthroplasties limits evaluation. Urinary bladder is partially obscured but grossly normal in contour as visualized. The uterus is not definitively seen and may be absent, atrophic, or obscured.   ABDOMINAL  WALL: No sizable abdominal wall hernia.   BONES: Bilateral hip arthroplasties in place. Extensive degenerative changes of the lumbar spine. Smooth L4 superior endplate depression with sclerotic margin consistent with Schmorl's node. No definite focal concerning lytic or blastic osseous lesion.       CHEST:   Dense nearly 5 cm opacity in the superior segment of the left lower lobe significantly enlarged since 05/03/2023, presumably reflects the patient's known lung cancer. Clinical correlation and follow-up recommended.   Emphysematous changes and a small ground-glass nodule in the left upper lobe are similar to the previous exam.   ABDOMEN AND PELVIS:   Mild lymphadenopathy which could reflect patient's known CLL.   Mildly enlarged and likely hypodense/fatty infiltrated liver.   Mild distal colon diverticulosis.   Limited assessment of the pelvis due to artifact from bilateral hip arthroplasties.   Additional findings as described above.   MACRO: None.   Signed by: Yessica Goode 2/13/2024 4:10 PM Dictation workstation:   [id number]     NM PET CT lung CA staging    Result Date: 3/16/2024  Interpreted By:  Annette Garcia and Maltbie Grace STUDY: NM PET CT LUNG CA STAGING;  3/14/2024 3:46 pm   INDICATION: Signs/Symptoms:lung cancer now w/increased LLL nodule on recent CT scan.   Patient is a 73-year-old lady with CLL 1st diagnosed in 2015 on surveillance and adenocarcinoma of the left lower lobe 1st diagnosed July 2023 status post SBRT September 2023, now presenting for restaging scan.   COMPARISON: PET-CT 02/03/2023 CT chest abdomen pelvis 02/12/2024   ACCESSION NUMBER(S): [id number]   ORDERING CLINICIAN: SOREN KING   TECHNIQUE: TECHNIQUE DIVISION OF NUCLEAR MEDICINE POSITRON EMISSION TOMOGRAPHY (PET-CT)   The patient received an intravenous dose of 13.9 mCi of Fluorine-18 fluorodeoxyglucose (FDG). Positron emission tomographic (PET) images from skull base to the mid thighs were then acquired after a one hour delay.  Also acquired was a contemporaneous low dose non-contrast CT scan performed for attenuation correction of PET images and anatomic localization. The PET and CT images were digitally fused for display. All images were acquired on a combined PET-CT scanner unit. Some areas of FDG accumulation may be described in standardized uptake value (SUV) units.   CODING: Subsequent Treatment Strategy (PS)   CALIBRATION: Dose Injection-to-Scan Interval (mins): 64 min Mediastinal bloodpool SUV (normal 1.5-2.5): 3.3 Blood glucose: 154 mg/dL   FINDINGS: HEAD AND NECK: *No evidence of focal hypermetabolic lesion in the brain parenchyma, noting that evaluation is limited because of the expected physiologic diffuse FDG uptake in the brain. *No evidence of paranasal sinus disease. *Thyroid gland is unremarkable. *FDG avidity in bilateral palatine tonsils, likely reactive. *No enlarged or FDG avid cervical lymphadenopathy is present.   CHEST: *Masslike consolidation with focal hypermetabolic activity in the superior segment left lower lobe has increased in size, density, and metabolic activity as compared to prior exam (SUV 5.1, previously 1.4). Focal hypermetabolic activity is seen within the chest wall immediately posterior to this lesion (SUV 4.4). *No enlarged or FDG avid mediastinal, hilar or axillary lymphadenopathy. *Both breasts are unremarkable.   ABDOMEN AND PELVIS: *No FDG avid lymphadenopathy in the abdomen and pelvis. *Both adrenal glands are unremarkable. *Physiologic radiotracer uptake is present in the liver and spleen with excretion into the bowel loops and the genitourinary tract. Status post cholecystectomy   MUSCULOSKELETAL/EXTREMITIES: * No concerning FDG avid bone lesion throughout the axial and appendicular skeleton. * Bilateral hip arthroplasties are noted.       1. Masslike consolidation with focal FDG activity in the superior segment left lower lobe has increased in size, density, and metabolic activity as  compared to prior exam. Findings likely reflect known lung cancer with superimposed postradiation changes. Focal FDG activity within the chest wall immediately posterior to this lesion with adjacent cortical thickening of the left 5th rib, for which local invasion is not excluded. 2. No PET evidence of musa or distant metastasis.   I personally reviewed the images/study and I agree with the findings as stated by Nuclear Medicine fellow Jessie Velasquez MD. This study was interpreted at [facility], [place], [place].   MACRO: None   Signed by: Annette Garcia 3/16/2024 11:29 AM Dictation workstation:   [id number]       Assessment/Plan:    1. Lung adenocarcinoma      cT1cN0 noting her disease is not FDG avid and she never had a regular CT chest   additional CHELY nodules/GGO changes of unclear significance but possibly inflammatory   not a surgical candidate   Completed SBRT to left lung 9/11/2023 - 9/20/2023  No current indication for chemo     2/19/2024: CT c/a/p c/f  disease progression; plan for PET/CT and rebiopsy as needed.    3/20/2024: Discussed concern for disease recurrence vs radiation affect so will send to IR for re biopsy. Discussed talking to her PCP/GI about constipation and medication that she is on for weight loss (Ozempill, OTC)    2. CLL   her anemia is related to iron deficiency and less likely CLL; s/p 3 doses of Venofer with improvement of H/H.  her wbc has been overall stable     3. Anemia   secondary to iron deficiency   s/p IV iron with good response  had colonoscopy last year with polyps detected      RTC after biopsy    Patient verbalized understanding, and all her questions were answered to her satisfaction    30 min spent with patient greater than 50 % of which was spent in consultation and coordination of care via telehealth

## 2024-03-22 DIAGNOSIS — M79.7 FIBROMYALGIA: ICD-10-CM

## 2024-03-25 NOTE — TELEPHONE ENCOUNTER
Patient calling to advised was told to call office per pharmacist about the refill for DULoxetine (Cymbalta) 60 mg .. No notes that the Dr  called patient..

## 2024-03-26 RX ORDER — DULOXETIN HYDROCHLORIDE 60 MG/1
60 CAPSULE, DELAYED RELEASE ORAL DAILY
Qty: 30 CAPSULE | Refills: 2 | OUTPATIENT
Start: 2024-03-26

## 2024-04-03 ENCOUNTER — TELEPHONE (OUTPATIENT)
Dept: RHEUMATOLOGY | Facility: CLINIC | Age: 74
End: 2024-04-03
Payer: MEDICARE

## 2024-04-03 DIAGNOSIS — M06.9 RHEUMATOID ARTHRITIS, INVOLVING UNSPECIFIED SITE, UNSPECIFIED WHETHER RHEUMATOID FACTOR PRESENT (MULTI): Primary | ICD-10-CM

## 2024-04-03 RX ORDER — METHOTREXATE 2.5 MG/1
15 TABLET ORAL
Qty: 12 TABLET | Refills: 2 | OUTPATIENT
Start: 2024-04-03

## 2024-04-03 NOTE — TELEPHONE ENCOUNTER
Patient called for a refill on    Methotrexate 2.5mg #96 take 6 tablets once a week as directed     Patient is out of medication     [facility] [phone]

## 2024-04-04 ENCOUNTER — HOSPITAL ENCOUNTER (OUTPATIENT)
Dept: RADIOLOGY | Facility: HOSPITAL | Age: 74
Discharge: HOME | End: 2024-04-04
Payer: MEDICARE

## 2024-04-04 VITALS
RESPIRATION RATE: 14 BRPM | SYSTOLIC BLOOD PRESSURE: 108 MMHG | OXYGEN SATURATION: 91 % | WEIGHT: 208.2 LBS | BODY MASS INDEX: 38.31 KG/M2 | TEMPERATURE: 97.7 F | HEIGHT: 62 IN | DIASTOLIC BLOOD PRESSURE: 74 MMHG | HEART RATE: 76 BPM

## 2024-04-04 DIAGNOSIS — C91.10 CLL (CHRONIC LYMPHOCYTIC LEUKEMIA) (MULTI): ICD-10-CM

## 2024-04-04 LAB
ANION GAP SERPL CALC-SCNC: 9 MMOL/L (ref 10–20)
BUN SERPL-MCNC: 16 MG/DL (ref 6–23)
CALCIUM SERPL-MCNC: 9.1 MG/DL (ref 8.6–10.3)
CHLORIDE SERPL-SCNC: 101 MMOL/L (ref 98–107)
CO2 SERPL-SCNC: 30 MMOL/L (ref 21–32)
CREAT SERPL-MCNC: 0.88 MG/DL (ref 0.5–1.05)
EGFRCR SERPLBLD CKD-EPI 2021: 70 ML/MIN/1.73M*2
ERYTHROCYTE [DISTWIDTH] IN BLOOD BY AUTOMATED COUNT: 13.8 % (ref 11.5–14.5)
GLUCOSE SERPL-MCNC: 127 MG/DL (ref 74–99)
HCT VFR BLD AUTO: 37.1 % (ref 36–46)
HGB BLD-MCNC: 11.4 G/DL (ref 12–16)
MCH RBC QN AUTO: 34 PG (ref 26–34)
MCHC RBC AUTO-ENTMCNC: 30.7 G/DL (ref 32–36)
MCV RBC AUTO: 111 FL (ref 80–100)
NRBC BLD-RTO: 0 /100 WBCS (ref 0–0)
PLATELET # BLD AUTO: 241 X10*3/UL (ref 150–450)
POTASSIUM SERPL-SCNC: 4.1 MMOL/L (ref 3.5–5.3)
RBC # BLD AUTO: 3.35 X10*6/UL (ref 4–5.2)
SODIUM SERPL-SCNC: 136 MMOL/L (ref 136–145)
WBC # BLD AUTO: 17 X10*3/UL (ref 4.4–11.3)

## 2024-04-04 PROCEDURE — 36415 COLL VENOUS BLD VENIPUNCTURE: CPT | Performed by: RADIOLOGY

## 2024-04-04 PROCEDURE — 80048 BASIC METABOLIC PNL TOTAL CA: CPT | Performed by: RADIOLOGY

## 2024-04-04 PROCEDURE — 2500000005 HC RX 250 GENERAL PHARMACY W/O HCPCS: Performed by: RADIOLOGY

## 2024-04-04 PROCEDURE — 88360 TUMOR IMMUNOHISTOCHEM/MANUAL: CPT | Mod: TC,PORLAB | Performed by: PHYSICIAN ASSISTANT

## 2024-04-04 PROCEDURE — 2500000004 HC RX 250 GENERAL PHARMACY W/ HCPCS (ALT 636 FOR OP/ED): Performed by: RADIOLOGY

## 2024-04-04 PROCEDURE — 32408 CORE NDL BX LNG/MED PERQ: CPT

## 2024-04-04 PROCEDURE — 76937 US GUIDE VASCULAR ACCESS: CPT

## 2024-04-04 PROCEDURE — 88342 IMHCHEM/IMCYTCHM 1ST ANTB: CPT | Performed by: STUDENT IN AN ORGANIZED HEALTH CARE EDUCATION/TRAINING PROGRAM

## 2024-04-04 PROCEDURE — 32408 CORE NDL BX LNG/MED PERQ: CPT | Performed by: RADIOLOGY

## 2024-04-04 PROCEDURE — 2720000007 HC OR 272 NO HCPCS

## 2024-04-04 PROCEDURE — 99152 MOD SED SAME PHYS/QHP 5/>YRS: CPT

## 2024-04-04 PROCEDURE — 99152 MOD SED SAME PHYS/QHP 5/>YRS: CPT | Performed by: RADIOLOGY

## 2024-04-04 PROCEDURE — 88305 TISSUE EXAM BY PATHOLOGIST: CPT | Performed by: STUDENT IN AN ORGANIZED HEALTH CARE EDUCATION/TRAINING PROGRAM

## 2024-04-04 PROCEDURE — 88360 TUMOR IMMUNOHISTOCHEM/MANUAL: CPT | Performed by: STUDENT IN AN ORGANIZED HEALTH CARE EDUCATION/TRAINING PROGRAM

## 2024-04-04 PROCEDURE — 85027 COMPLETE CBC AUTOMATED: CPT | Performed by: RADIOLOGY

## 2024-04-04 RX ORDER — HEPARIN 100 UNIT/ML
500 SYRINGE INTRAVENOUS ONCE AS NEEDED
Status: CANCELLED | OUTPATIENT
Start: 2024-04-04

## 2024-04-04 RX ORDER — MIDAZOLAM HYDROCHLORIDE 1 MG/ML
INJECTION, SOLUTION INTRAMUSCULAR; INTRAVENOUS
Status: COMPLETED | OUTPATIENT
Start: 2024-04-04 | End: 2024-04-04

## 2024-04-04 RX ORDER — FENTANYL CITRATE 50 UG/ML
INJECTION, SOLUTION INTRAMUSCULAR; INTRAVENOUS
Status: COMPLETED | OUTPATIENT
Start: 2024-04-04 | End: 2024-04-04

## 2024-04-04 RX ORDER — ONDANSETRON HYDROCHLORIDE 2 MG/ML
4 INJECTION, SOLUTION INTRAVENOUS EVERY 6 HOURS PRN
Status: CANCELLED | OUTPATIENT
Start: 2024-04-04

## 2024-04-04 RX ORDER — LIDOCAINE HYDROCHLORIDE 20 MG/ML
INJECTION, SOLUTION EPIDURAL; INFILTRATION; INTRACAUDAL; PERINEURAL
Status: COMPLETED | OUTPATIENT
Start: 2024-04-04 | End: 2024-04-04

## 2024-04-04 RX ADMIN — FENTANYL CITRATE 50 MCG: 50 INJECTION INTRAMUSCULAR; INTRAVENOUS at 08:51

## 2024-04-04 RX ADMIN — LIDOCAINE HYDROCHLORIDE 5 ML: 20 INJECTION, SOLUTION EPIDURAL; INFILTRATION; INTRACAUDAL; PERINEURAL at 08:53

## 2024-04-04 RX ADMIN — MIDAZOLAM 1 MG: 1 INJECTION INTRAMUSCULAR; INTRAVENOUS at 08:51

## 2024-04-04 ASSESSMENT — PAIN SCALES - GENERAL
PAINLEVEL_OUTOF10: 0 - NO PAIN

## 2024-04-04 ASSESSMENT — PAIN - FUNCTIONAL ASSESSMENT
PAIN_FUNCTIONAL_ASSESSMENT: 0-10

## 2024-04-04 NOTE — POST-PROCEDURE NOTE
Interventional Radiology Brief Postprocedure Note    Attending: Ivan Fuentes MD      Assistant: none    Diagnosis: lung mass    Description of procedure: lung  biopsy     Anesthesia:  Local, mod sed    Complications: None    Estimated Blood Loss: none      specimens collected      See detailed result report with images in PACS.    The patient tolerated the procedure well without incident or complication and is in stable condition.

## 2024-04-04 NOTE — NURSING NOTE
Patient's O2 sat improved on room air. Patient reports no complaints. Pt ambulated without difficulty. IV site d/c'd. Discharge instructions reviewed.

## 2024-04-08 NOTE — PRE-SEDATION DOCUMENTATION
"  Interventional Radiology Preprocedure Note    Alysia Magdaleno   Indication for procedure: The encounter diagnosis was CLL (chronic lymphocytic leukemia) (CMS/[facility]).    Relevant review of systems: NA      /74   Pulse 76   Temp 36.5 °C (97.7 °F) (Temporal)   Resp 14   Ht 1.575 m (5' 2\")   Wt 94.4 kg (208 lb 3.2 oz)   SpO2 91%   BMI 38.08 kg/m²    Relevant Labs:   Lab Results   Component Value Date    CREATININE 0.88 04/04/2024    EGFR 70 04/04/2024    INR 1.1 07/13/2023    PROTIME 12.5 07/13/2023       Planned Sedation/Anesthesia: Moderate    Airway assessment: normal    Directed physical examination:    Physical Exam  Constitutional:       Appearance: Normal appearance.   Cardiovascular:      Rate and Rhythm: Normal rate and regular rhythm.      Pulses: Normal pulses.   Pulmonary:      Comments: Diminished   Musculoskeletal:         General: Normal range of motion.   Skin:     General: Skin is warm and dry.      Capillary Refill: Capillary refill takes less than 2 seconds.   Neurological:      General: No focal deficit present.      Mental Status: She is alert and oriented to person, place, and time.         Mallampati: III (soft and hard palate and base of uvula visible)    ASA Score: ASA 3 - Patient with moderate systemic disease with functional limitations    Benefits, risks and alternatives of procedure and planned sedation have been discussed with the patient and/or their representative. All questions answered and they agree to proceed.     TERESE Alfaro-CNP I spent 15 minutes with the patient.   "

## 2024-04-09 ENCOUNTER — TELEPHONE (OUTPATIENT)
Dept: RHEUMATOLOGY | Facility: CLINIC | Age: 74
End: 2024-04-09
Payer: MEDICARE

## 2024-04-09 DIAGNOSIS — M06.9 RHEUMATOID ARTHRITIS, INVOLVING UNSPECIFIED SITE, UNSPECIFIED WHETHER RHEUMATOID FACTOR PRESENT (MULTI): Primary | ICD-10-CM

## 2024-04-09 RX ORDER — METHOTREXATE 2.5 MG/1
15 TABLET ORAL
Qty: 12 TABLET | OUTPATIENT
Start: 2024-04-09

## 2024-04-09 NOTE — TELEPHONE ENCOUNTER
Patient called for a refill on    methotrexate 2.5mg tablets take 6 tablets by mouth 1 time per week    [facility] [phone]

## 2024-04-10 ENCOUNTER — APPOINTMENT (OUTPATIENT)
Dept: HEMATOLOGY/ONCOLOGY | Facility: CLINIC | Age: 74
End: 2024-04-10
Payer: MEDICARE

## 2024-04-10 LAB
FOCUSED SOLID TUMOR DNA/RNA RESULTS: NORMAL
LAB AP ASR DISCLAIMER: NORMAL
LABORATORY COMMENT REPORT: NORMAL
PATH REPORT.COMMENTS IMP SPEC: NORMAL
PATH REPORT.FINAL DX SPEC: NORMAL
PATH REPORT.GROSS SPEC: NORMAL
PATH REPORT.RELEVANT HX SPEC: NORMAL
PATH REPORT.TOTAL CANCER: NORMAL
RESIDENT REVIEW: NORMAL

## 2024-04-11 ENCOUNTER — APPOINTMENT (OUTPATIENT)
Dept: PRIMARY CARE | Facility: CLINIC | Age: 74
End: 2024-04-11
Payer: MEDICARE

## 2024-04-16 ENCOUNTER — HOSPITAL ENCOUNTER (OUTPATIENT)
Dept: RADIATION ONCOLOGY | Facility: CLINIC | Age: 74
Setting detail: RADIATION/ONCOLOGY SERIES
Discharge: HOME | End: 2024-04-16
Payer: MEDICARE

## 2024-04-16 VITALS
DIASTOLIC BLOOD PRESSURE: 76 MMHG | RESPIRATION RATE: 16 BRPM | TEMPERATURE: 97.2 F | OXYGEN SATURATION: 95 % | SYSTOLIC BLOOD PRESSURE: 122 MMHG | HEART RATE: 55 BPM

## 2024-04-16 DIAGNOSIS — C34.92 ADENOCARCINOMA OF LEFT LUNG (MULTI): Primary | ICD-10-CM

## 2024-04-16 PROCEDURE — 99214 OFFICE O/P EST MOD 30 MIN: CPT | Performed by: STUDENT IN AN ORGANIZED HEALTH CARE EDUCATION/TRAINING PROGRAM

## 2024-04-16 ASSESSMENT — ENCOUNTER SYMPTOMS
LOSS OF SENSATION IN FEET: 0
OCCASIONAL FEELINGS OF UNSTEADINESS: 1
DEPRESSION: 0

## 2024-04-16 ASSESSMENT — PATIENT HEALTH QUESTIONNAIRE - PHQ9
2. FEELING DOWN, DEPRESSED OR HOPELESS: NOT AT ALL
1. LITTLE INTEREST OR PLEASURE IN DOING THINGS: NOT AT ALL
SUM OF ALL RESPONSES TO PHQ9 QUESTIONS 1 & 2: 0

## 2024-04-16 NOTE — PROGRESS NOTES
Radiation Oncology Follow-Up    Patient Name:  Alysia Magdaleno  MRN:  [id number]  :  1950    Referring Provider: No ref. provider found  Primary Care Provider: Xi Dwyer MD  Care Team: Patient Care Team:  Xi Dwyer MD as PCP - General (Family Medicine)    Date of Service: 2024    SUBJECTIVE  History of Present Illness:  Alysia Magdaleno is a 73 y.o. 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     CT c/a/p on 24 showed a 5 cm opacity in the superior LLL that was significantly enlarged compared to 2023 and 2023.    PET/CT on 3/14/24 showed focal uptake in the LLL mass that was increased compared to 2023 (SUV 5.1 compared 1.4). There was no evidence of musa or distant metastases.    She underwent CT-guided biopsy of the LLL mass on 24 and pathology showed adenocarcinoma (lepidic pattern). NGS testing could not be done because of insufficient tumor cellularity.    Today, she reports that she fell forwards on her stomach this morning. She thinks her legs gave out under her, and she denies passing out, palpitations, or chest pain. She did not hit her head. She does feel her breathing has been a little worse today, and she has a nonproductive cough. She is using 2L oxygen at night but not during the day. She endorses some posterior headaches that she thinks is from neck tension. She denies vision changes, nausea/vomiting, or other weakness/numbness.    Treatment Rendered:   Left lung SBRT 55 Gy/5 fractions completed 23.       Review of Systems:   Review of Systems   All other systems reviewed and are negative.      Performance Status:   The Karnofsky performance scale today is 70, Cares for self; unable to carry on normal activity or to do active work (ECOG equivalent 1).       OBJECTIVE  Vital  Signs:  /76 (BP Location: Left arm, Patient Position: Sitting, BP Cuff Size: Adult)   Pulse 55   Temp 36.2 °C (97.2 °F) (Skin)   Resp 16   SpO2 95%   Physical Exam  Constitutional:       General: She is not in acute distress.     Appearance: She is obese. She is not toxic-appearing.   HENT:      Head: Normocephalic and atraumatic.      Mouth/Throat:      Mouth: Mucous membranes are moist.      Pharynx: Oropharynx is clear. No oropharyngeal exudate or posterior oropharyngeal erythema.   Eyes:      General: No scleral icterus.     Extraocular Movements: Extraocular movements intact.      Conjunctiva/sclera: Conjunctivae normal.      Pupils: Pupils are equal, round, and reactive to light.   Cardiovascular:      Rate and Rhythm: Normal rate and regular rhythm.      Heart sounds: No murmur heard.     No friction rub. No gallop.   Pulmonary:      Effort: Pulmonary effort is normal. No respiratory distress.      Breath sounds: No stridor. Rales present. No wheezing or rhonchi.      Comments: Decreased breath sounds bilaterally, bibasilar rales  Chest:      Chest wall: No tenderness.   Musculoskeletal:         General: Normal range of motion.      Cervical back: Normal range of motion and neck supple.   Skin:     General: Skin is warm and dry.      Coloration: Skin is not jaundiced.      Findings: No lesion or rash.   Neurological:      Mental Status: She is alert and oriented to person, place, and time. Mental status is at baseline.      Cranial Nerves: No cranial nerve deficit.      Sensory: Sensory deficit present.      Motor: No weakness.      Coordination: Coordination normal.      Comments: Subjective weakness in legs. Motor strength grossly 5/5 in all limbs. Chronic neuropathy in feet, otherwise no sensory deficits.   Psychiatric:         Mood and Affect: Mood normal.         Behavior: Behavior normal.            ASSESSMENT:   Alysia Magdaleno is a 73 y.o. female with stage IA3 [id number] left lower lobe  adenocarcinoma s/p definitive SBRT 55 Gy/5 fractions completed 9/20/23.    Her CT c/a/p on 2/12/24 and PET/CT on 3/14/24 showed enlargement of the treated LLL mass with increased uptake concerning for local recurrence, with no evidence of musa or distant metastases. CT-guided biopsy of the mass on 4/4/24 confirmed adenocarcinoma.    I do not recommend additional radiation at this time as it has been less than 1 year since she completed SBRT. She is going to follow up with medical oncology next week about additional treatment options such as systemic therapy.    She had an apparent mechanical fall earlier today related to leg weakness. She does not have a focal deficit on exam today, but I will order brain MRI to rule out brain metastases given the fall and her locally recurrent NSCLC.    She may follow up with radiation oncology as needed.    NCCN Guidelines were applicable to guide this patients treatment plan.    Bakari Mead MD    Addendum 4/19/24  We discussed her case at thoracic tumor board today. On review of her latest lung biopsy, there was scant adenocarcinoma cells in the setting of extensive fibrosis. It was felt that it was too soon to call this local recurrence as residual tumor cells can be seen months after SBRT, as in the MISSILE phase II trial of lung resection 10 weeks after SBRT. The tumor board recommendation was for repeat chest imaging in 3 months and holding off on salvage therapy for now.

## 2024-04-16 NOTE — PROGRESS NOTES
Radiation Oncology Nursing Note    Pain: The patient's current pain level was assessed.  They report currently having a pain of 3 out of 10.  They feel their pain is under control with the use of pain medications.    Review of Systems:  Review of Systems - Oncology

## 2024-04-17 NOTE — ADDENDUM NOTE
Encounter addended by: Bakari PARR MD on: 4/17/2024 8:53 AM   Actions taken: Order list changed, Diagnosis association updated

## 2024-04-19 ENCOUNTER — TUMOR BOARD CONFERENCE (OUTPATIENT)
Dept: HEMATOLOGY/ONCOLOGY | Facility: HOSPITAL | Age: 74
End: 2024-04-19
Payer: MEDICARE

## 2024-04-19 NOTE — TUMOR BOARD NOTE
Tumor Board Documentation    Alysia Magdaleno was presented by Bakari Mead MD at our Tumor Board on 4/19/2024, which included representatives from thoracic surgery, medical oncology, radiation oncology, pulmonology, radiology, and pathology.    Alysia is a 73 y.o. female with stage IA3 [id number] left lower lobe adenocarcinoma (PDL1 TPS <1%, KRAS G12C positive) s/p definitive SBRT 55 Gy/5 fractions completed 9/20/23.     Post-treatment CT c/a/p on 2/12/24 and PET/CT on 3/14/24 showed enlargement of the treated LLL mass with increased uptake concerning for local recurrence, with no evidence of musa or distant metastases. CT-guided biopsy of the mass on 4/4/24 confirmed presence of scant adenocarcinoma cells; PDL1 and NGS testing could not be done because of insufficient cellularity.    The scant adenocarcinoma cells in the setting of fibrosis may not represent true local recurrence as prior studies of lung resection following SBRT showed less than complete pathologic response even months later.    The tumor board recommended repeating chest imaging in about 3 months to monitor for recurrence before considering salvage therapy.    Recommendations:  Repeat chest imaging in 3 months  Do not recommend re-irradiation or salvage systemic therapy at this time.

## 2024-04-19 NOTE — ADDENDUM NOTE
Encounter addended by: Bakari PARR MD on: 4/19/2024 3:32 PM   Actions taken: Order list changed, Diagnosis association updated, Clinical Note Signed

## 2024-04-26 ENCOUNTER — APPOINTMENT (OUTPATIENT)
Dept: RADIOLOGY | Facility: HOSPITAL | Age: 74
End: 2024-04-26
Payer: MEDICARE

## 2024-04-26 ENCOUNTER — HOSPITAL ENCOUNTER (EMERGENCY)
Facility: HOSPITAL | Age: 74
Discharge: HOME | End: 2024-04-26
Payer: MEDICARE

## 2024-04-26 ENCOUNTER — APPOINTMENT (OUTPATIENT)
Dept: CARDIOLOGY | Facility: HOSPITAL | Age: 74
End: 2024-04-26
Payer: MEDICARE

## 2024-04-26 VITALS
HEIGHT: 60 IN | SYSTOLIC BLOOD PRESSURE: 126 MMHG | RESPIRATION RATE: 18 BRPM | DIASTOLIC BLOOD PRESSURE: 71 MMHG | TEMPERATURE: 97.7 F | OXYGEN SATURATION: 94 % | BODY MASS INDEX: 39.39 KG/M2 | HEART RATE: 46 BPM | WEIGHT: 200.62 LBS

## 2024-04-26 DIAGNOSIS — S20.211A CONTUSION OF RIB ON RIGHT SIDE, INITIAL ENCOUNTER: ICD-10-CM

## 2024-04-26 DIAGNOSIS — N30.90 CYSTITIS: Primary | ICD-10-CM

## 2024-04-26 LAB
ALBUMIN SERPL BCP-MCNC: 3.8 G/DL (ref 3.4–5)
ALP SERPL-CCNC: 68 U/L (ref 33–136)
ALT SERPL W P-5'-P-CCNC: 12 U/L (ref 7–45)
ANION GAP SERPL CALC-SCNC: 13 MMOL/L (ref 10–20)
APPEARANCE UR: ABNORMAL
AST SERPL W P-5'-P-CCNC: 13 U/L (ref 9–39)
BASOPHILS # BLD MANUAL: 0 X10*3/UL (ref 0–0.1)
BASOPHILS NFR BLD MANUAL: 0 %
BILIRUB SERPL-MCNC: 0.4 MG/DL (ref 0–1.2)
BILIRUB UR STRIP.AUTO-MCNC: ABNORMAL MG/DL
BUN SERPL-MCNC: 20 MG/DL (ref 6–23)
CALCIUM SERPL-MCNC: 9.1 MG/DL (ref 8.6–10.3)
CARDIAC TROPONIN I PNL SERPL HS: 7 NG/L (ref 0–13)
CHLORIDE SERPL-SCNC: 98 MMOL/L (ref 98–107)
CO2 SERPL-SCNC: 27 MMOL/L (ref 21–32)
COLOR UR: ABNORMAL
CREAT SERPL-MCNC: 1.1 MG/DL (ref 0.5–1.05)
EGFRCR SERPLBLD CKD-EPI 2021: 54 ML/MIN/1.73M*2
EOSINOPHIL # BLD MANUAL: 0.4 X10*3/UL (ref 0–0.4)
EOSINOPHIL NFR BLD MANUAL: 2 %
ERYTHROCYTE [DISTWIDTH] IN BLOOD BY AUTOMATED COUNT: 12.7 % (ref 11.5–14.5)
GLUCOSE SERPL-MCNC: 133 MG/DL (ref 74–99)
GLUCOSE UR STRIP.AUTO-MCNC: NEGATIVE MG/DL
HCT VFR BLD AUTO: 37.7 % (ref 36–46)
HGB BLD-MCNC: 11.8 G/DL (ref 12–16)
HYALINE CASTS #/AREA URNS AUTO: ABNORMAL /LPF
IMM GRANULOCYTES # BLD AUTO: 0.13 X10*3/UL (ref 0–0.5)
IMM GRANULOCYTES NFR BLD AUTO: 0.7 % (ref 0–0.9)
KETONES UR STRIP.AUTO-MCNC: ABNORMAL MG/DL
LEUKOCYTE ESTERASE UR QL STRIP.AUTO: ABNORMAL
LYMPHOCYTES # BLD MANUAL: 6 X10*3/UL (ref 0.8–3)
LYMPHOCYTES NFR BLD MANUAL: 30 %
MAGNESIUM SERPL-MCNC: 1.6 MG/DL (ref 1.6–2.4)
MCH RBC QN AUTO: 33.9 PG (ref 26–34)
MCHC RBC AUTO-ENTMCNC: 31.3 G/DL (ref 32–36)
MCV RBC AUTO: 108 FL (ref 80–100)
MONOCYTES # BLD MANUAL: 0.6 X10*3/UL (ref 0.05–0.8)
MONOCYTES NFR BLD MANUAL: 3 %
MUCOUS THREADS #/AREA URNS AUTO: ABNORMAL /LPF
NEUTS SEG # BLD MANUAL: 13 X10*3/UL (ref 1.6–5)
NEUTS SEG NFR BLD MANUAL: 65 %
NITRITE UR QL STRIP.AUTO: NEGATIVE
NRBC BLD-RTO: 0 /100 WBCS (ref 0–0)
PH UR STRIP.AUTO: 5 [PH]
PLATELET # BLD AUTO: 195 X10*3/UL (ref 150–450)
PLATELET CLUMP BLD QL SMEAR: PRESENT
POTASSIUM SERPL-SCNC: 4.4 MMOL/L (ref 3.5–5.3)
PROT SERPL-MCNC: 6.5 G/DL (ref 6.4–8.2)
PROT UR STRIP.AUTO-MCNC: ABNORMAL MG/DL
RBC # BLD AUTO: 3.48 X10*6/UL (ref 4–5.2)
RBC # UR STRIP.AUTO: NEGATIVE /UL
RBC #/AREA URNS AUTO: >20 /HPF
RBC MORPH BLD: ABNORMAL
SODIUM SERPL-SCNC: 134 MMOL/L (ref 136–145)
SP GR UR STRIP.AUTO: 1.02
SQUAMOUS #/AREA URNS AUTO: ABNORMAL /HPF
TOTAL CELLS COUNTED BLD: 100
UROBILINOGEN UR STRIP.AUTO-MCNC: <2 MG/DL
WBC # BLD AUTO: 20 X10*3/UL (ref 4.4–11.3)
WBC #/AREA URNS AUTO: ABNORMAL /HPF

## 2024-04-26 PROCEDURE — 87086 URINE CULTURE/COLONY COUNT: CPT | Mod: GEALAB | Performed by: PHYSICIAN ASSISTANT

## 2024-04-26 PROCEDURE — 83735 ASSAY OF MAGNESIUM: CPT | Performed by: PHYSICIAN ASSISTANT

## 2024-04-26 PROCEDURE — 85027 COMPLETE CBC AUTOMATED: CPT | Performed by: PHYSICIAN ASSISTANT

## 2024-04-26 PROCEDURE — 99285 EMERGENCY DEPT VISIT HI MDM: CPT | Mod: 25

## 2024-04-26 PROCEDURE — 70450 CT HEAD/BRAIN W/O DYE: CPT

## 2024-04-26 PROCEDURE — 93005 ELECTROCARDIOGRAM TRACING: CPT

## 2024-04-26 PROCEDURE — 2500000004 HC RX 250 GENERAL PHARMACY W/ HCPCS (ALT 636 FOR OP/ED): Performed by: PHYSICIAN ASSISTANT

## 2024-04-26 PROCEDURE — 71260 CT THORAX DX C+: CPT

## 2024-04-26 PROCEDURE — 96365 THER/PROPH/DIAG IV INF INIT: CPT

## 2024-04-26 PROCEDURE — 36415 COLL VENOUS BLD VENIPUNCTURE: CPT | Performed by: PHYSICIAN ASSISTANT

## 2024-04-26 PROCEDURE — 85007 BL SMEAR W/DIFF WBC COUNT: CPT | Performed by: PHYSICIAN ASSISTANT

## 2024-04-26 PROCEDURE — 81001 URINALYSIS AUTO W/SCOPE: CPT | Performed by: PHYSICIAN ASSISTANT

## 2024-04-26 PROCEDURE — 80053 COMPREHEN METABOLIC PANEL: CPT | Performed by: PHYSICIAN ASSISTANT

## 2024-04-26 PROCEDURE — 70450 CT HEAD/BRAIN W/O DYE: CPT | Performed by: RADIOLOGY

## 2024-04-26 PROCEDURE — 2550000001 HC RX 255 CONTRASTS: Performed by: PHYSICIAN ASSISTANT

## 2024-04-26 PROCEDURE — 71260 CT THORAX DX C+: CPT | Mod: FOREIGN READ | Performed by: RADIOLOGY

## 2024-04-26 PROCEDURE — 84484 ASSAY OF TROPONIN QUANT: CPT | Performed by: PHYSICIAN ASSISTANT

## 2024-04-26 RX ORDER — CEFTRIAXONE 1 G/50ML
1 INJECTION, SOLUTION INTRAVENOUS ONCE
Status: COMPLETED | OUTPATIENT
Start: 2024-04-26 | End: 2024-04-26

## 2024-04-26 RX ORDER — CEPHALEXIN 500 MG/1
500 CAPSULE ORAL 4 TIMES DAILY
Qty: 28 CAPSULE | Refills: 0 | Status: SHIPPED | OUTPATIENT
Start: 2024-04-26 | End: 2024-05-03

## 2024-04-26 RX ORDER — LIDOCAINE 50 MG/G
1 PATCH TOPICAL DAILY
Qty: 30 PATCH | Refills: 0 | Status: SHIPPED | OUTPATIENT
Start: 2024-04-26

## 2024-04-26 RX ADMIN — SODIUM CHLORIDE 500 ML: 9 INJECTION, SOLUTION INTRAVENOUS at 17:45

## 2024-04-26 RX ADMIN — IOHEXOL 75 ML: 350 INJECTION, SOLUTION INTRAVENOUS at 16:36

## 2024-04-26 RX ADMIN — CEFTRIAXONE SODIUM 1 G: 1 INJECTION, SOLUTION INTRAVENOUS at 17:45

## 2024-04-26 ASSESSMENT — LIFESTYLE VARIABLES
HAVE YOU EVER FELT YOU SHOULD CUT DOWN ON YOUR DRINKING: NO
EVER HAD A DRINK FIRST THING IN THE MORNING TO STEADY YOUR NERVES TO GET RID OF A HANGOVER: NO
TOTAL SCORE: 0
EVER FELT BAD OR GUILTY ABOUT YOUR DRINKING: NO
HAVE PEOPLE ANNOYED YOU BY CRITICIZING YOUR DRINKING: NO

## 2024-04-26 ASSESSMENT — COLUMBIA-SUICIDE SEVERITY RATING SCALE - C-SSRS
2. HAVE YOU ACTUALLY HAD ANY THOUGHTS OF KILLING YOURSELF?: NO
6. HAVE YOU EVER DONE ANYTHING, STARTED TO DO ANYTHING, OR PREPARED TO DO ANYTHING TO END YOUR LIFE?: NO
1. IN THE PAST MONTH, HAVE YOU WISHED YOU WERE DEAD OR WISHED YOU COULD GO TO SLEEP AND NOT WAKE UP?: NO

## 2024-04-26 ASSESSMENT — PAIN DESCRIPTION - LOCATION: LOCATION: RIB CAGE

## 2024-04-26 ASSESSMENT — PAIN DESCRIPTION - PAIN TYPE: TYPE: ACUTE PAIN

## 2024-04-26 ASSESSMENT — PAIN SCALES - GENERAL: PAINLEVEL_OUTOF10: 8

## 2024-04-26 ASSESSMENT — PAIN - FUNCTIONAL ASSESSMENT: PAIN_FUNCTIONAL_ASSESSMENT: 0-10

## 2024-04-26 ASSESSMENT — PAIN DESCRIPTION - ORIENTATION: ORIENTATION: RIGHT

## 2024-04-26 NOTE — ED PROVIDER NOTES
HPI   Chief Complaint   Patient presents with    Fall     Pt was bending off and fell landing on her rt side. Pt c/o rib pain. Pt also c/o leg weakness.       This is a 73-year-old female coming in for right-sided chest wall injury.  She states she was getting out of bed when her feet slipped because she was holding onto a dresser with wheels.  This caused her to fall and she fell down onto the right side.  She did not hit her head.  She is on anticoagulation medication but is adamant that she did not hit her head.  She denies any loss of consciousness.  No vomiting.  Patient continued to have pain throughout the day to the right-sided rib so she wanted to come in to be evaluated.  Patient does also report lower extremity weakness however states this has been going on for the last few weeks.      History provided by:  Patient                      [place] Coma Scale Score: 15                     Patient History   Past Medical History:   Diagnosis Date    Adenocarcinoma, lung (Multi)     Atrial fibrillation (Multi)     CHF (congestive heart failure) (Multi)     Chronic diastolic (congestive) heart failure (Multi)     CLL (chronic lymphocytic leukemia) (Multi)     COPD (chronic obstructive pulmonary disease) (Multi)     Hemorrhage of anus and rectum 09/19/2016    Rectal bleeding    Hemorrhage of anus and rectum 04/10/2017    Rectal bleeding    Hyperlipidemia     Neuropathy     Other specified disorders of gingiva and edentulous alveolar ridge 04/10/2017    Pain in gums    Pain in left foot 03/22/2017    Left foot pain    Personal history of (healed) traumatic fracture     History of fracture of nasal bone    Personal history of leukemia     History of leukemia    Personal history of Methicillin resistant Staphylococcus aureus infection     History of methicillin resistant Staphylococcus aureus infection    Personal history of other diseases of the circulatory system     History of atrial fibrillation    Personal  history of other diseases of the circulatory system     History of hypertension    Personal history of other diseases of the digestive system     History of irritable bowel syndrome    Personal history of other diseases of the digestive system     History of constipation    Personal history of other diseases of the musculoskeletal system and connective tissue     History of rheumatoid arthritis    Personal history of other diseases of the musculoskeletal system and connective tissue     History of chronic back pain    Personal history of other diseases of the musculoskeletal system and connective tissue 06/17/2019    History of arthritis    Personal history of other diseases of the nervous system and sense organs     History of glaucoma    Personal history of other diseases of the respiratory system     History of chronic obstructive lung disease    Personal history of other diseases of the respiratory system     H/O emphysema    Personal history of other diseases of the respiratory system     History of asthma    Personal history of other endocrine, nutritional and metabolic disease     History of hyperlipidemia    Personal history of other endocrine, nutritional and metabolic disease     History of hyperthyroidism    Personal history of other endocrine, nutritional and metabolic disease     History of type 2 diabetes mellitus    Unspecified injury of right ankle, initial encounter 04/10/2017    Right ankle injury     Past Surgical History:   Procedure Laterality Date    CT GUIDED PERCUTANEOUS BIOPSY LUNG  7/19/2023    CT GUIDED PERCUTANEOUS BIOPSY LUNG 7/19/2023 [facility] CT    GALLBLADDER SURGERY  01/12/2016    Gallbladder Surgery    HIP SURGERY  01/12/2016    Hip Surgery    KNEE SURGERY  01/12/2016    Knee Surgery    OTHER SURGICAL HISTORY  04/22/2019    Throat surgery    OTHER SURGICAL HISTORY  09/18/2019    Surgery    OTHER SURGICAL HISTORY  09/18/2019    Iridotomy peripheral  laser    OTHER SURGICAL HISTORY   2019    Jaw surgery    OTHER SURGICAL HISTORY  2019    Cholecystectomy    OTHER SURGICAL HISTORY  2019    Hysterectomy     Family History   Problem Relation Name Age of Onset    Cancer Mother      Drug abuse Mother      Mental illness Mother      No Known Problems Father      Cancer Sister       Social History     Tobacco Use    Smoking status: Former     Current packs/day: 0.00     Average packs/day: 1 pack/day for 40.0 years (40.0 ttl pk-yrs)     Types: Cigarettes     Start date: 1975     Quit date: 2015     Years since quittin.8     Passive exposure: Past    Smokeless tobacco: Never   Vaping Use    Vaping status: Never Used   Substance Use Topics    Alcohol use: Not Currently    Drug use: Yes     Types: Marijuana     Comment: occasional - has medical marijuana card       Physical Exam   ED Triage Vitals [24 1535]   Temperature Heart Rate Respirations BP   36.5 °C (97.7 °F) (!) 46 18 126/71      Pulse Ox Temp src Heart Rate Source Patient Position   94 % -- -- --      BP Location FiO2 (%)     -- --       Physical Exam  Vitals and nursing note reviewed.   Constitutional:       General: She is not in acute distress.     Appearance: Normal appearance. She is not toxic-appearing.   HENT:      Head: Normocephalic and atraumatic.      Nose: Nose normal.      Mouth/Throat:      Mouth: Mucous membranes are moist.      Pharynx: Oropharynx is clear.   Eyes:      Extraocular Movements: Extraocular movements intact.      Conjunctiva/sclera: Conjunctivae normal.      Pupils: Pupils are equal, round, and reactive to light.   Cardiovascular:      Rate and Rhythm: Normal rate and regular rhythm.      Pulses: Normal pulses.      Heart sounds: Normal heart sounds.   Pulmonary:      Effort: Pulmonary effort is normal. No respiratory distress.      Breath sounds: Normal breath sounds.   Chest:      Chest wall: Tenderness present.          Comments: Patient has pain on palpation to the anterior  chest wall just underneath the right breast.  No obvious bruising to the area.  Abdominal:      General: Abdomen is flat. Bowel sounds are normal.      Palpations: Abdomen is soft.      Tenderness: There is no abdominal tenderness.   Musculoskeletal:         General: Normal range of motion.      Cervical back: Normal range of motion and neck supple.   Skin:     General: Skin is warm and dry.      Coloration: Skin is not jaundiced or pale.      Findings: No bruising.   Neurological:      General: No focal deficit present.      Mental Status: She is alert and oriented to person, place, and time. Mental status is at baseline.   Psychiatric:         Mood and Affect: Mood normal.         Behavior: Behavior normal.         ED Course & MDM   ED Course as of 04/26/24 1822 Fri Apr 26, 2024 1757 EKG completed at 1741 shows on my interpretation sinus rhythm with a ventricular rate of 68 bpm.  QTc of 440 ms.  No signs of STEMI [RS]      ED Course User Index  [RS] Julio Gamboa PA-C         Diagnoses as of 04/26/24 1822   Cystitis   Contusion of rib on right side, initial encounter       Medical Decision Making  Summary:  Medical Decision Making:   Patient presented as described in HPI. Patient case including ROS, PE, and treatment and plan discussed with ED attending if attached as cosigner. Results from labs and or imaging included below if completed. Alysia Magdaleno  is a 73 y.o. coming in for Patient presents with:  Fall: Pt was bending off and fell landing on her rt side. Pt c/o rib pain. Pt also c/o leg weakness.  .  Due to patient's complaint lab work as well as imaging was completed.  Patient's white blood cell count is at baseline and is elevated at 20.  She has slightly decreased kidney function.  She was found to have a slight UTI.  Due to this patient is given 500 cc of normal saline as well as Rocephin IV.  Imaging was completed of the chest and shows no acute abnormalities.  She has a stable mass.  She is made  aware of this and knows about it.  No other concerning abnormalities.  She will be discharged with a Lidoderm patch for her rib pain and advised continue with pain management as well as OTC meds as needed.  She will be discharged on Keflex as well for the UTI.      Disposition is completed with shared decision making with the patient or guardian present with the patient. They were advised to follow up with PCP or recommended provider in 2-3 days for another evaluation and exam. I advised the patient to return or go to closest emergency room immediately if symptoms change, get worse, or new symptoms develop prior to follow up. I explained the plan and treatment course. Patient/guardian is in agreement with plan, treatment course, and follow up and state that they will comply.    Labs Reviewed  CBC WITH AUTO DIFFERENTIAL - Abnormal     WBC                           20.0 (*)               nRBC                          0.0                    RBC                           3.48 (*)               Hemoglobin                    11.8 (*)               Hematocrit                    37.7                   MCV                           108 (*)                MCH                           33.9                   MCHC                          31.3 (*)               RDW                           12.7                   Platelets                     195                    Immature Granulocytes %, Automated   0.7                    Immature Granulocytes Absolute, Au*   0.13                     Narrative: The previously reported component Neutrophils % is no longer being reported.The previously reported component Lymphocytes % is no longer being reported.The previously reported component Monocytes % is no longer being reported.The previously                 reported component Eosinophils % is no longer being reported.The previously reported component Basophils % is no longer being reported.The previously reported component Absolute  Neutrophils is no longer being reported.The previously reported                 component Absolute Lymphocytes is no longer being reported.The previously reported component Absolute Monocytes is no longer being reported.The previously reported component Absolute Eosinophils is no longer being reported.The previously reported                 component Absolute Basophils is no longer being reported.  COMPREHENSIVE METABOLIC PANEL - Abnormal     Glucose                       133 (*)                Sodium                        134 (*)                Potassium                     4.4                    Chloride                      98                     Bicarbonate                   27                     Anion Gap                     13                     Urea Nitrogen                 20                     Creatinine                    1.10 (*)               eGFR                          54 (*)                 Calcium                       9.1                    Albumin                       3.8                    Alkaline Phosphatase          68                     Total Protein                 6.5                    AST                           13                     Bilirubin, Total              0.4                    ALT                           12                  URINALYSIS WITH REFLEX CULTURE AND MICROSCOPIC - Abnormal     Color, Urine                  Susan (*)               Appearance, Urine             Hazy (*)               Specific Gravity, Urine       1.025                  pH, Urine                     5.0                    Protein, Urine                100 (2+) (*)               Glucose, Urine                NEGATIVE                Blood, Urine                  NEGATIVE                Ketones, Urine                5 (TRACE) (*)               Bilirubin, Urine              SMALL (1+) (*)               Urobilinogen, Urine           <2.0                   Nitrite, Urine                NEGATIVE                 Leukocyte Esterase, Urine       (*)               MICROSCOPIC ONLY, URINE - Abnormal     WBC, Urine                    21-50 (*)               RBC, Urine                    >20 (*)                Squamous Epithelial Cells, Urine   10-25 (FEW)                Mucus, Urine                  FEW                    Hyaline Casts, Urine          4+ (*)              MANUAL DIFFERENTIAL - Abnormal     Neutrophils %, Manual         65.0                   Lymphocytes %, Manual         30.0                   Monocytes %, Manual           3.0                    Eosinophils %, Manual         2.0                    Basophils %, Manual           0.0                    Seg Neutrophils Absolute, Manual   13.00 (*)               Lymphocytes Absolute, Manual   6.00 (*)               Monocytes Absolute, Manual    0.60                   Eosinophils Absolute, Manual   0.40                   Basophils Absolute, Manual    0.00                   Total Cells Counted           100                    RBC Morphology                See Below                Clumped Platelets             Present             MAGNESIUM - Normal     Magnesium                     1.60                TROPONIN I, HIGH SENSITIVITY - Normal     Troponin I, High Sensitivity   7                        Narrative: Less than 99th percentile of normal range cutoff-                Female and children under 18 years old <14 ng/L; Male <21 ng/L: Negative                Repeat testing should be performed if clinically indicated.                                 Female and children under 18 years old 14-50 ng/L; Male 21-50 ng/L:                Consistent with possible cardiac damage and possible increased clinical                 risk. Serial measurements may help to assess extent of myocardial damage.                                 >50 ng/L: Consistent with cardiac damage, increased clinical risk and                myocardial infarction. Serial measurements may help assess  extent of                 myocardial damage.                                  NOTE: Children less than 1 year old may have higher baseline troponin                 levels and results should be interpreted in conjunction with the overall                 clinical context.                                 NOTE: Troponin I testing is performed using a different                 testing methodology at [facility] than at other                 [facility]. Direct result comparisons should only                 be made within the same method.  URINE CULTURE  URINALYSIS WITH REFLEX CULTURE AND MICROSCOPIC       Narrative: The following orders were created for panel order Urinalysis with Reflex Culture and Microscopic.                Procedure                               Abnormality         Status                                   ---------                               -----------         ------                                   Urinalysis with Reflex C...[[id number]]  Abnormal            Final result                             Extra Urine Gray Tube[[id number]]                            In process                                               Please view results for these tests on the individual orders.  EXTRA URINE GRAY TUBE   CT head wo IV contrast   Final Result    No acute intracranial finding. MRI may be obtained clinically    indicated.          Signed by: Edd Martin 4/26/2024 4:55 PM    Dictation workstation:   [id number]     CT chest w IV contrast   Final Result    A soft tissue density mass remains in the left upper lobe similar to    the prior PET/CT scan but no acute abnormalities are visible in the    chest..    Signed by Cristian Looney MD                            Tests/Medications/Escalations of Care considered but not given: As in MDM    Patient care discussed with: N/A  Social Determinants affecting care: N/A    Final diagnosis and disposition as documented     ED Course as of 04/26/24  1839  ------------------------------------------------------------  Time: 04/26 1757  Comment: EKG completed at 1741 shows on my interpretation sinus rhythm with a ventricular rate of 68 bpm.  QTc of 440 ms.  No signs of STEMI  By: Julio Gamboa PA-C    ------------------------------------------------------------  Diagnoses as of 04/26/24 1839  Cystitis  Contusion of rib on right side, initial encounter       Shared decision making was completed and determined that patient will be discharged. I discussed the differential; results and discharge plan with the patient and/or family/friend/caregiver if present.  I emphasized the importance of follow-up with the physician I referred them to in the timeframe recommended.  I explained reasons for the patient to return to the Emergency Department. They agreed that if they feel their condition is worsening or if they have any other concern they should call 911 immediately for further assistance. I gave the patient an opportunity to ask all questions they had and answered all of them accordingly. They understand return precautions and discharge instructions. The patient and/or family/friend/caregiver expressed understanding verbally and that they would comply.     Disposition: Discharge      This note has been transcribed using voice recognition and may contain grammatical errors, misplaced words, incorrect words, incorrect phrases or other errors.         Procedure  Procedures     Julio Gamboa PA-C  04/26/24 [id number]

## 2024-04-27 LAB — HOLD SPECIMEN: NORMAL

## 2024-04-28 LAB — BACTERIA UR CULT: ABNORMAL

## 2024-04-29 ENCOUNTER — APPOINTMENT (OUTPATIENT)
Dept: RADIOLOGY | Facility: HOSPITAL | Age: 74
End: 2024-04-29
Payer: MEDICARE

## 2024-04-29 ENCOUNTER — TELEPHONE (OUTPATIENT)
Dept: PRIMARY CARE | Facility: CLINIC | Age: 74
End: 2024-04-29
Payer: MEDICARE

## 2024-04-29 ENCOUNTER — HOSPITAL ENCOUNTER (EMERGENCY)
Facility: HOSPITAL | Age: 74
Discharge: HOME | End: 2024-04-30
Attending: STUDENT IN AN ORGANIZED HEALTH CARE EDUCATION/TRAINING PROGRAM
Payer: MEDICARE

## 2024-04-29 ENCOUNTER — NURSE TRIAGE (OUTPATIENT)
Dept: HEMATOLOGY/ONCOLOGY | Facility: CLINIC | Age: 74
End: 2024-04-29
Payer: MEDICARE

## 2024-04-29 ENCOUNTER — TELEPHONE (OUTPATIENT)
Dept: HEMATOLOGY/ONCOLOGY | Facility: CLINIC | Age: 74
End: 2024-04-29
Payer: MEDICARE

## 2024-04-29 VITALS
SYSTOLIC BLOOD PRESSURE: 104 MMHG | HEIGHT: 60 IN | OXYGEN SATURATION: 98 % | WEIGHT: 200 LBS | HEART RATE: 66 BPM | BODY MASS INDEX: 39.27 KG/M2 | DIASTOLIC BLOOD PRESSURE: 70 MMHG | TEMPERATURE: 97.7 F | RESPIRATION RATE: 18 BRPM

## 2024-04-29 DIAGNOSIS — I10 BENIGN ESSENTIAL HYPERTENSION: ICD-10-CM

## 2024-04-29 DIAGNOSIS — K21.9 GERD WITHOUT ESOPHAGITIS: ICD-10-CM

## 2024-04-29 DIAGNOSIS — R07.89 CHEST WALL PAIN: Primary | ICD-10-CM

## 2024-04-29 DIAGNOSIS — J18.9 PNEUMONIA OF RIGHT LUNG DUE TO INFECTIOUS ORGANISM, UNSPECIFIED PART OF LUNG: ICD-10-CM

## 2024-04-29 LAB
ANION GAP SERPL CALC-SCNC: 12 MMOL/L (ref 10–20)
BASOPHILS # BLD MANUAL: 0 X10*3/UL (ref 0–0.1)
BASOPHILS NFR BLD MANUAL: 0 %
BUN SERPL-MCNC: 12 MG/DL (ref 6–23)
CALCIUM SERPL-MCNC: 9.4 MG/DL (ref 8.6–10.3)
CHLORIDE SERPL-SCNC: 101 MMOL/L (ref 98–107)
CO2 SERPL-SCNC: 28 MMOL/L (ref 21–32)
CREAT SERPL-MCNC: 0.81 MG/DL (ref 0.5–1.05)
EGFRCR SERPLBLD CKD-EPI 2021: 78 ML/MIN/1.73M*2
EOSINOPHIL # BLD MANUAL: 0.4 X10*3/UL (ref 0–0.4)
EOSINOPHIL NFR BLD MANUAL: 2 %
ERYTHROCYTE [DISTWIDTH] IN BLOOD BY AUTOMATED COUNT: 12.7 % (ref 11.5–14.5)
GLUCOSE SERPL-MCNC: 101 MG/DL (ref 74–99)
HCT VFR BLD AUTO: 40.9 % (ref 36–46)
HGB BLD-MCNC: 13 G/DL (ref 12–16)
IMM GRANULOCYTES # BLD AUTO: 0.09 X10*3/UL (ref 0–0.5)
IMM GRANULOCYTES NFR BLD AUTO: 0.5 % (ref 0–0.9)
LYMPHOCYTES # BLD MANUAL: 11.54 X10*3/UL (ref 0.8–3)
LYMPHOCYTES NFR BLD MANUAL: 58 %
MCH RBC QN AUTO: 34 PG (ref 26–34)
MCHC RBC AUTO-ENTMCNC: 31.8 G/DL (ref 32–36)
MCV RBC AUTO: 107 FL (ref 80–100)
MONOCYTES # BLD MANUAL: 1.39 X10*3/UL (ref 0.05–0.8)
MONOCYTES NFR BLD MANUAL: 7 %
NEUTS SEG # BLD MANUAL: 6.57 X10*3/UL (ref 1.6–5)
NEUTS SEG NFR BLD MANUAL: 33 %
NRBC BLD-RTO: 0 /100 WBCS (ref 0–0)
PLATELET # BLD AUTO: 236 X10*3/UL (ref 150–450)
POTASSIUM SERPL-SCNC: 4.8 MMOL/L (ref 3.5–5.3)
RBC # BLD AUTO: 3.82 X10*6/UL (ref 4–5.2)
RBC MORPH BLD: ABNORMAL
SODIUM SERPL-SCNC: 136 MMOL/L (ref 136–145)
TOTAL CELLS COUNTED BLD: 100
WBC # BLD AUTO: 19.9 X10*3/UL (ref 4.4–11.3)

## 2024-04-29 PROCEDURE — 71046 X-RAY EXAM CHEST 2 VIEWS: CPT

## 2024-04-29 PROCEDURE — 80048 BASIC METABOLIC PNL TOTAL CA: CPT | Performed by: STUDENT IN AN ORGANIZED HEALTH CARE EDUCATION/TRAINING PROGRAM

## 2024-04-29 PROCEDURE — 74177 CT ABD & PELVIS W/CONTRAST: CPT

## 2024-04-29 PROCEDURE — 2550000001 HC RX 255 CONTRASTS: Performed by: STUDENT IN AN ORGANIZED HEALTH CARE EDUCATION/TRAINING PROGRAM

## 2024-04-29 PROCEDURE — 99285 EMERGENCY DEPT VISIT HI MDM: CPT | Mod: 25

## 2024-04-29 PROCEDURE — 85027 COMPLETE CBC AUTOMATED: CPT | Performed by: STUDENT IN AN ORGANIZED HEALTH CARE EDUCATION/TRAINING PROGRAM

## 2024-04-29 PROCEDURE — 2500000004 HC RX 250 GENERAL PHARMACY W/ HCPCS (ALT 636 FOR OP/ED): Performed by: STUDENT IN AN ORGANIZED HEALTH CARE EDUCATION/TRAINING PROGRAM

## 2024-04-29 PROCEDURE — 71046 X-RAY EXAM CHEST 2 VIEWS: CPT | Performed by: RADIOLOGY

## 2024-04-29 PROCEDURE — 85007 BL SMEAR W/DIFF WBC COUNT: CPT | Performed by: STUDENT IN AN ORGANIZED HEALTH CARE EDUCATION/TRAINING PROGRAM

## 2024-04-29 PROCEDURE — 36415 COLL VENOUS BLD VENIPUNCTURE: CPT | Performed by: STUDENT IN AN ORGANIZED HEALTH CARE EDUCATION/TRAINING PROGRAM

## 2024-04-29 PROCEDURE — 71260 CT THORAX DX C+: CPT | Performed by: RADIOLOGY

## 2024-04-29 PROCEDURE — 74177 CT ABD & PELVIS W/CONTRAST: CPT | Performed by: RADIOLOGY

## 2024-04-29 PROCEDURE — 99284 EMERGENCY DEPT VISIT MOD MDM: CPT | Mod: 25

## 2024-04-29 PROCEDURE — 96374 THER/PROPH/DIAG INJ IV PUSH: CPT

## 2024-04-29 RX ORDER — DEXLANSOPRAZOLE 60 MG/1
CAPSULE, DELAYED RELEASE ORAL
Qty: 90 CAPSULE | Refills: 0 | Status: SHIPPED | OUTPATIENT
Start: 2024-04-29 | End: 2024-06-06 | Stop reason: SDUPTHER

## 2024-04-29 RX ORDER — ORPHENADRINE CITRATE 30 MG/ML
60 INJECTION INTRAMUSCULAR; INTRAVENOUS ONCE
Status: COMPLETED | OUTPATIENT
Start: 2024-04-29 | End: 2024-04-29

## 2024-04-29 RX ADMIN — ORPHENADRINE CITRATE 60 MG: 60 INJECTION INTRAMUSCULAR; INTRAVENOUS at 21:25

## 2024-04-29 RX ADMIN — IOHEXOL 75 ML: 350 INJECTION, SOLUTION INTRAVENOUS at 22:37

## 2024-04-29 ASSESSMENT — PAIN DESCRIPTION - PAIN TYPE: TYPE: ACUTE PAIN

## 2024-04-29 ASSESSMENT — LIFESTYLE VARIABLES
TOTAL SCORE: 0
EVER HAD A DRINK FIRST THING IN THE MORNING TO STEADY YOUR NERVES TO GET RID OF A HANGOVER: NO
HAVE PEOPLE ANNOYED YOU BY CRITICIZING YOUR DRINKING: NO
HAVE YOU EVER FELT YOU SHOULD CUT DOWN ON YOUR DRINKING: NO
EVER FELT BAD OR GUILTY ABOUT YOUR DRINKING: NO

## 2024-04-29 ASSESSMENT — PAIN - FUNCTIONAL ASSESSMENT: PAIN_FUNCTIONAL_ASSESSMENT: 0-10

## 2024-04-29 ASSESSMENT — PAIN SCALES - GENERAL: PAINLEVEL_OUTOF10: 10 - WORST POSSIBLE PAIN

## 2024-04-29 ASSESSMENT — PAIN DESCRIPTION - ORIENTATION: ORIENTATION: RIGHT

## 2024-04-29 ASSESSMENT — PAIN DESCRIPTION - LOCATION: LOCATION: RIB CAGE

## 2024-04-29 ASSESSMENT — PAIN DESCRIPTION - DESCRIPTORS: DESCRIPTORS: SHARP

## 2024-04-29 NOTE — TELEPHONE ENCOUNTER
Medication: dexlansoprazole     Dosage:  60 mg      Sig: take 1 capsule by mouth every morning TAKE BEFORE MEALS     Dispense Quantity:    Refills:     Pharmacy: [facility] [place]     LOV: 12/11/23    NOV: 6/6/24

## 2024-04-29 NOTE — TELEPHONE ENCOUNTER
"Additional Information   Commented on: Where is the pain? Is there more than one place where you're having pain?     \"Right side under her breast but it might be up to armpit. It's hard to tell.  The sharp pain is under her breast where she fell on the right side.\"   Commented on: What does the pain feel like? What words would describe your pain (sharp, burning, stabbing, etc)?     Burning and aching on the side.   Commented on: What helps these problems?     Lidocaine patches did not help   Commented on: How often does the pain occur?     Feels better when she stands, when she moves especially her right arm, coughs and sneezes, it hurts.   Commented on: Is there a time of day when the pain is better or worse?     Pain is all the same except a little better when she stands up.   Commented on: Are you having any of these problems?     Taking a sleeping pill hurts.  Getting into bed is hurt.    Protocols used: Pain    "

## 2024-04-29 NOTE — TELEPHONE ENCOUNTER
Pt called and told Dr. Mendez recommends she go to the ER.  She was not happy to hear this, but she said she would go.  Discussed why it would benefit her to be seen, that her pain has increased and we were concerned about this, that they can prescribe stronger pain medications for pt. Also, that we wanted to be sure she breathing ok.  She was not sure what to do and she decided she would drive herself.  I advised strongly she try to find a ride and she states she had no one to call because everyone is away.  She did not want to call EMS.  Dr. Mendez aware of all.  During this call, she stated her breathing was ok.  She just thinks she irritated the area by moving around, especially in bed.      Disposition note written for pt [facility]. (Closest to pt)

## 2024-04-30 ENCOUNTER — TELEPHONE (OUTPATIENT)
Dept: PHARMACY | Facility: HOSPITAL | Age: 74
End: 2024-04-30
Payer: MEDICARE

## 2024-04-30 LAB
ATRIAL RATE: 68 BPM
P AXIS: 66 DEGREES
P OFFSET: 124 MS
P ONSET: 70 MS
PR INTERVAL: 276 MS
Q ONSET: 208 MS
QRS COUNT: 12 BEATS
QRS DURATION: 118 MS
QT INTERVAL: 422 MS
QTC CALCULATION(BAZETT): 448 MS
QTC FREDERICIA: 440 MS
R AXIS: -48 DEGREES
T AXIS: 25 DEGREES
T OFFSET: 419 MS
VENTRICULAR RATE: 68 BPM

## 2024-04-30 PROCEDURE — 2500000001 HC RX 250 WO HCPCS SELF ADMINISTERED DRUGS (ALT 637 FOR MEDICARE OP): Performed by: STUDENT IN AN ORGANIZED HEALTH CARE EDUCATION/TRAINING PROGRAM

## 2024-04-30 RX ORDER — OXYCODONE HYDROCHLORIDE 5 MG/1
5 TABLET ORAL EVERY 8 HOURS PRN
Qty: 9 TABLET | Refills: 0 | Status: SHIPPED | OUTPATIENT
Start: 2024-04-30 | End: 2024-05-03

## 2024-04-30 RX ORDER — LEVOFLOXACIN 750 MG/1
750 TABLET ORAL DAILY
Qty: 10 TABLET | Refills: 0 | Status: SHIPPED | OUTPATIENT
Start: 2024-04-30 | End: 2024-05-14

## 2024-04-30 RX ADMIN — LEVOFLOXACIN 750 MG: 500 TABLET, FILM COATED ORAL at 00:19

## 2024-04-30 NOTE — DISCHARGE INSTRUCTIONS
Please return to the emergency department if you develop shortness of breath, uncontrolled pain, lightheadedness.  Please follow-up with your primary care provider in the morning.

## 2024-04-30 NOTE — ED PROVIDER NOTES
External Records Reviewed: Oncology note from 4/26/2024    HPI  Alysia Magdaleno is a 73 y.o. female  who is presenting with right sided pain.  Patient states she had a fall 3 days ago.  Patient notes she went to the ER where she had CT imaging and told she had just bruised her ribs.  Patient states she was given Tylenol and lidocaine patches however her pain has been uncontrolled.  Patient states it hurts to take deep breaths.  Patient notes she called her primary care doctor today who told her to come to the ER.  Patient denies any lightheadedness, abdominal pain, nausea or vomiting.  Patient notes she is on Eliquis.  Patient denies any subsequent falls after 3 days ago.  Patient states she has been taking the Tylenol and lidocaine patches without any improvement.    PMH  Past Medical History:   Diagnosis Date    Adenocarcinoma, lung (Multi)     Atrial fibrillation (Multi)     CHF (congestive heart failure) (Multi)     Chronic diastolic (congestive) heart failure (Multi)     CLL (chronic lymphocytic leukemia) (Multi)     COPD (chronic obstructive pulmonary disease) (Multi)     Hemorrhage of anus and rectum 09/19/2016    Rectal bleeding    Hemorrhage of anus and rectum 04/10/2017    Rectal bleeding    Hyperlipidemia     Neuropathy     Other specified disorders of gingiva and edentulous alveolar ridge 04/10/2017    Pain in gums    Pain in left foot 03/22/2017    Left foot pain    Personal history of (healed) traumatic fracture     History of fracture of nasal bone    Personal history of leukemia     History of leukemia    Personal history of Methicillin resistant Staphylococcus aureus infection     History of methicillin resistant Staphylococcus aureus infection    Personal history of other diseases of the circulatory system     History of atrial fibrillation    Personal history of other diseases of the circulatory system     History of hypertension    Personal history of other diseases of the digestive system      History of irritable bowel syndrome    Personal history of other diseases of the digestive system     History of constipation    Personal history of other diseases of the musculoskeletal system and connective tissue     History of rheumatoid arthritis    Personal history of other diseases of the musculoskeletal system and connective tissue     History of chronic back pain    Personal history of other diseases of the musculoskeletal system and connective tissue 06/17/2019    History of arthritis    Personal history of other diseases of the nervous system and sense organs     History of glaucoma    Personal history of other diseases of the respiratory system     History of chronic obstructive lung disease    Personal history of other diseases of the respiratory system     H/O emphysema    Personal history of other diseases of the respiratory system     History of asthma    Personal history of other endocrine, nutritional and metabolic disease     History of hyperlipidemia    Personal history of other endocrine, nutritional and metabolic disease     History of hyperthyroidism    Personal history of other endocrine, nutritional and metabolic disease     History of type 2 diabetes mellitus    Unspecified injury of right ankle, initial encounter 04/10/2017    Right ankle injury       Meds  Current Outpatient Medications   Medication Instructions    albuterol (ProAir HFA) 90 mcg/actuation inhaler 2 puffs, inhalation, 4 times daily RT    apixaban (ELIQUIS) 5 mg, oral, 2 times daily    atorvastatin (LIPITOR) 20 mg, oral, Nightly    cephalexin (KEFLEX) 500 mg, oral, 4 times daily    cloNIDine (CATAPRES) 0.1 mg, oral, Every 8 hours    dexlansoprazole (Dexilant) 60 mg DR capsule take 1 capsule by mouth every morning TAKE BEFORE MEALS    DULoxetine (CYMBALTA) 60 mg, oral, Daily, Do not crush or chew.    famotidine (Pepcid) 40 mg tablet take 1 tablet by mouth once daily    fexofenadine (ALLEGRA) 180 mg, oral, Daily    folic acid  (Folvite) 1 mg tablet 1 tablet, oral, Daily    gabapentin (NEURONTIN) 300 mg, oral, 3 times daily    lidocaine (Lidoderm) 5 % patch 1 patch, transdermal, Daily, Remove & discard patch within 12 hours or as directed by MD.    Linzess 72 mcg capsule 1 capsule, oral, Daily    methotrexate (Trexall) 2.5 mg tablet 6 tablets, oral, Once Weekly    metoprolol succinate XL (TOPROL-XL) 50 mg, oral, Daily, Do not crush or chew.    naloxone (NARCAN) 4 mg, nasal, As needed    NON FORMULARY 1 each, oral, Daily, Ozempill 800 mg daily    propafenone (RYTHMOL) 225 mg, oral, Every 8 hours    roflumilast (DALIRESP) 500 mcg, oral, Daily    simethicone (MYLICON) 125 mg, oral, Every 6 hours PRN    sulfaSALAzine (AZULFIDINE) 500 mg, oral, 2 times daily    traZODone (DESYREL) 50 mg, oral, Nightly PRN    Trelegy Ellipta 200-62.5-25 mcg blister with device 1 puff, inhalation, Daily       Allergies  Allergies   Allergen Reactions    Adhesive Unknown    Adhesive Tape-Silicones Itching    Aspirin Unknown and Hives    Hydrocodone-Acetaminophen Unknown     nausea    Morphine Hives    Naproxen Unknown    Niacin Unknown    Nsaids (Non-Steroidal Anti-Inflammatory Drug) Bleeding    Opioids-Meperidine And Related Other    Tizanidine Unknown    Other Rash     gold plated metal        SHx  Social History     Tobacco Use    Smoking status: Former     Current packs/day: 0.00     Average packs/day: 1 pack/day for 40.0 years (40.0 ttl pk-yrs)     Types: Cigarettes     Start date: 1975     Quit date: 2015     Years since quittin.8     Passive exposure: Past    Smokeless tobacco: Never   Vaping Use    Vaping status: Never Used   Substance Use Topics    Alcohol use: Not Currently    Drug use: Yes     Types: Marijuana     Comment: occasional - has medical marijuana card       ------------------------------------------------------------------------------------------------------------------------------------------    /70 (BP Location: Left  arm, Patient Position: Sitting)   Pulse 66   Temp 36.5 °C (97.7 °F) (Temporal)   Resp 18   Ht 1.524 m (5')   Wt 90.7 kg (200 lb)   SpO2 98%   BMI 39.06 kg/m²     Physical Exam  Constitutional:       General: She is not in acute distress.  HENT:      Head: Normocephalic and atraumatic.      Mouth/Throat:      Mouth: Mucous membranes are moist.   Eyes:      Extraocular Movements: Extraocular movements intact.      Conjunctiva/sclera: Conjunctivae normal.      Pupils: Pupils are equal, round, and reactive to light.   Cardiovascular:      Rate and Rhythm: Normal rate and regular rhythm.      Heart sounds: No murmur heard.     No gallop.   Pulmonary:      Effort: Pulmonary effort is normal. No respiratory distress.      Breath sounds: Normal breath sounds. No wheezing.   Chest:      Chest wall: Tenderness (Over the right anterior chest wall underneath the right breast and the chest wall overlying the right upper quadrant.) present.   Abdominal:      General: There is no distension.      Palpations: Abdomen is soft.      Tenderness: There is no abdominal tenderness. There is no guarding.   Musculoskeletal:         General: No swelling or signs of injury. Normal range of motion.   Skin:     General: Skin is warm and dry.      Findings: No lesion or rash.   Neurological:      General: No focal deficit present.      Mental Status: She is alert and oriented to person, place, and time. Mental status is at baseline.   Psychiatric:         Mood and Affect: Mood normal.         Judgment: Judgment normal.          ------------------------------------------------------------------------------------------------------------------------------------------    Medical Decision Making: Patient is a 73-year-old female presenting with chest pain.  Patient is hemodynamically stable, afebrile, nontoxic-appearing on presentation.  Patient had a fall 3 days ago and had CT imaging performed.  I did review the CT imaging that showed no  traumatic findings.  Given her worsened pain and that the CT did not include her abdomen we will repeat CT imaging at this time.  Possibly there was a missed fracture or the CT imaging did not extend far enough down.  Basic lab work will be ordered and patient will be given morphine and Norflex for her pain. Patient's lab work was notable for a leukocytosis with a white blood cell count of 19.9.Further lab work was unremarkable.CT imaging showedA new right upper lobe groundglass opacity And given her Leukocytosis there is concern for pneumonia.  Patient Kelsey already taking antibiotics for UTI and we will switch her antibiotic to Levaquin.  Patient does take another QTc prolonging medication however per chart review she had an EKG performed 6 days ago and her QTc was 422 and she will be safe to have Levaquin as well.Patient will be provided a prescription for oxycodone as well as Levaquin.  Return precautions were discussed and patient felt comfortable going home.  Patient was discharged home in improved condition with instructions to follow-up with her primary care provider.    Diagnoses as of 04/30/24 0004   Chest wall pain   Pneumonia of right lung due to infectious organism, unspecified part of lung       Marie Wiggins MD  Emergency Medicine       Marie Wiggins MD  04/30/24 0006

## 2024-04-30 NOTE — PROGRESS NOTES
EDPD Note: Rapid Result Review    Reviewed Ms. Alysia Magdaleno 's chart regarding a inconclusive urine culture that was taken during their recent emergency room visit. The patient was not told about these results prior to leaving the emergency department. Therefore, patient was contacted and given proper education. Patient presented to the ED 4/26 due to a fall and was found to have a UTI and given Keflex 500 mg QID x 7 days at discharge. Patient then returned to the hospital 4/29 due to pain from her previous fall. She was found to have pneumonia so her antibiotic was switched to Levaquin to treat UTI and pneumonia. Since asymptomatic, not UTI treatment needed, however, patient encouraged to take for treatment of pneumonia. Patient has yet to  the medication from the pharmacy. Patient was encouraged to start this medication today. EKG performed 6 days ago with QTc was 422.    No results found for the last 90 days.      No further follow up needed from EDPD Team.     Marisela Burks, PharmD

## 2024-05-01 PROBLEM — R91.1 SOLITARY PULMONARY NODULE: Status: ACTIVE | Noted: 2023-02-03

## 2024-05-01 PROBLEM — R60.0 EDEMA OF FOOT: Status: ACTIVE | Noted: 2023-06-19

## 2024-05-01 PROBLEM — L03.90 CELLULITIS: Status: ACTIVE | Noted: 2024-05-01

## 2024-05-01 PROBLEM — R07.89 CHEST WALL PAIN: Status: ACTIVE | Noted: 2023-04-19

## 2024-05-01 PROBLEM — Z86.39 HISTORY OF TYPE 2 DIABETES MELLITUS: Status: ACTIVE | Noted: 2024-05-01

## 2024-05-01 PROBLEM — Z86.79 HISTORY OF HYPERTENSION: Status: ACTIVE | Noted: 2024-05-01

## 2024-05-01 PROBLEM — Z86.39 HISTORY OF ELEVATED LIPIDS: Status: ACTIVE | Noted: 2024-05-01

## 2024-05-01 PROBLEM — I48.0 PAROXYSMAL ATRIAL FIBRILLATION (MULTI): Status: ACTIVE | Noted: 2022-08-01

## 2024-05-01 PROBLEM — Z86.0100 HISTORY OF COLONIC POLYPS: Status: ACTIVE | Noted: 2022-07-14

## 2024-05-01 PROBLEM — Z85.6 HISTORY OF LEUKEMIA: Status: ACTIVE | Noted: 2024-05-01

## 2024-05-01 PROBLEM — R93.89 ABNORMAL COMPUTED TOMOGRAPHY SCAN: Status: ACTIVE | Noted: 2023-04-19

## 2024-05-01 PROBLEM — E66.01 SEVERE OBESITY (MULTI): Status: ACTIVE | Noted: 2022-11-14

## 2024-05-01 PROBLEM — R05.9 COUGH: Status: ACTIVE | Noted: 2024-05-01

## 2024-05-01 PROBLEM — Z86.79 HISTORY OF ATRIAL FIBRILLATION: Status: ACTIVE | Noted: 2024-05-01

## 2024-05-01 PROBLEM — I25.84 CALCIFICATION OF CORONARY ARTERY: Status: ACTIVE | Noted: 2022-08-01

## 2024-05-01 PROBLEM — N30.90 CYSTITIS: Status: ACTIVE | Noted: 2024-05-01

## 2024-05-01 PROBLEM — I25.10 CALCIFICATION OF CORONARY ARTERY: Status: ACTIVE | Noted: 2022-08-01

## 2024-05-01 PROBLEM — Z86.010 HISTORY OF COLONIC POLYPS: Status: ACTIVE | Noted: 2022-07-14

## 2024-05-01 PROBLEM — E11.9 TYPE 2 DIABETES MELLITUS (MULTI): Status: ACTIVE | Noted: 2022-08-01

## 2024-05-01 PROBLEM — R23.8 ABNORMAL FOOT COLOR: Status: ACTIVE | Noted: 2024-05-01

## 2024-05-01 PROBLEM — M79.2 NEUROPATHIC PAIN: Status: ACTIVE | Noted: 2024-05-01

## 2024-05-01 PROBLEM — J44.9 CHRONIC OBSTRUCTIVE PULMONARY DISEASE (MULTI): Status: ACTIVE | Noted: 2022-08-01

## 2024-05-01 PROBLEM — J18.9 PNEUMONIA DUE TO INFECTIOUS ORGANISM: Status: ACTIVE | Noted: 2024-05-01

## 2024-05-01 PROBLEM — I51.9 LEFT VENTRICULAR DYSFUNCTION: Status: ACTIVE | Noted: 2022-07-15

## 2024-05-01 PROBLEM — Z86.69 HISTORY OF GLAUCOMA: Status: ACTIVE | Noted: 2024-05-01

## 2024-05-01 PROBLEM — K21.9 GASTROESOPHAGEAL REFLUX DISEASE WITHOUT ESOPHAGITIS: Status: ACTIVE | Noted: 2022-08-01

## 2024-05-01 PROBLEM — S20.219A CONTUSION OF RIB: Status: ACTIVE | Noted: 2024-05-01

## 2024-05-01 PROBLEM — M54.50 LOW BACK PAIN, UNSPECIFIED: Status: ACTIVE | Noted: 2022-07-13

## 2024-05-01 PROBLEM — L25.9 CONTACT DERMATITIS: Status: ACTIVE | Noted: 2023-04-19

## 2024-05-01 PROBLEM — S32.000A COMPRESSION FRACTURE OF LUMBAR VERTEBRA (MULTI): Status: ACTIVE | Noted: 2024-05-01

## 2024-05-01 PROBLEM — M25.50 ARTHRALGIA OF MULTIPLE JOINTS: Status: ACTIVE | Noted: 2023-04-19

## 2024-05-01 PROBLEM — K63.5 POLYP OF COLON: Status: ACTIVE | Noted: 2022-08-01

## 2024-05-01 PROBLEM — E66.9 OBESITY: Status: ACTIVE | Noted: 2021-08-13

## 2024-05-01 PROBLEM — Z86.39 HISTORY OF HYPERTHYROIDISM: Status: ACTIVE | Noted: 2024-05-01

## 2024-05-01 PROBLEM — Z86.14 HISTORY OF METHICILLIN RESISTANT STAPHYLOCOCCUS AUREUS INFECTION: Status: ACTIVE | Noted: 2024-05-01

## 2024-05-01 PROBLEM — M05.9 SEROPOSITIVE RHEUMATOID ARTHRITIS (MULTI): Status: ACTIVE | Noted: 2022-08-01

## 2024-05-01 PROBLEM — M25.559 ARTHRALGIA OF HIP: Status: ACTIVE | Noted: 2023-04-19

## 2024-05-01 PROBLEM — E03.9 HYPOTHYROIDISM: Status: ACTIVE | Noted: 2022-08-01

## 2024-05-01 PROBLEM — G89.4 CHRONIC PAIN SYNDROME: Status: ACTIVE | Noted: 2022-08-01

## 2024-05-01 PROBLEM — D50.9 IRON DEFICIENCY ANEMIA: Status: ACTIVE | Noted: 2023-06-19

## 2024-05-01 PROBLEM — C91.10 CHRONIC LYMPHOCYTIC LEUKEMIA (MULTI): Status: ACTIVE | Noted: 2022-07-14

## 2024-05-01 RX ORDER — CLOBETASOL PROPIONATE 0.5 MG/G
AEROSOL, FOAM TOPICAL
COMMUNITY
End: 2024-05-03 | Stop reason: WASHOUT

## 2024-05-01 RX ORDER — TRAMADOL HYDROCHLORIDE 50 MG/1
TABLET ORAL
COMMUNITY
End: 2024-05-03 | Stop reason: WASHOUT

## 2024-05-01 RX ORDER — CREAM BASE NO.31
CREAM (GRAM) MISCELLANEOUS
COMMUNITY
End: 2024-05-03 | Stop reason: WASHOUT

## 2024-05-01 ASSESSMENT — ENCOUNTER SYMPTOMS
SHORTNESS OF BREATH: 0
ORTHOPNEA: 0
FEVER: 0
NEAR-SYNCOPE: 0
HEMATURIA: 0
SYNCOPE: 0
HEMATOCHEZIA: 0
CHILLS: 0
DYSPNEA ON EXERTION: 1
DIARRHEA: 1
IRREGULAR HEARTBEAT: 0
COUGH: 0
WHEEZING: 0
CONSTIPATION: 1
PALPITATIONS: 0
VOMITING: 0
ALTERED MENTAL STATUS: 0
NAUSEA: 0

## 2024-05-01 NOTE — PATIENT INSTRUCTIONS
Recommend Mediterranean style of eating  Continue current medications  Check fasting lipid panel in [place] as previously ordered  Check 14 day Holter monitor  Follow up with SARAH Meneses as scheduled in June  Follow-up with Dr. Rose in 3 months  If you have any questions or cardiac concerns, please call our office at [phone].

## 2024-05-01 NOTE — PROGRESS NOTES
Chief Complaint/Reason for Visit:  Bradycardia bradycardia    History Of Present Illness:    Alysia Magdaleno is a 73 y.o. female that presents to the office for evaluation of bradycardia.    Taking medications as prescribed.     PMH is significant for a-fib, HTN, CHF, CLL, COPD, depression, DM with diabetic neuropathy, emphysema/COPD (nocturnal O2), GERD, hyperlipidemia, LV dysfunction and reactive airway disease.     +chronic NORTH with minimal exertion, chronic intermittent BLE edema.     EKG personally reviewed today showed SR with HR 83 bpm.  This will go to the cardiologist for final review.     She had office visit with oncology 4/26/24 and per documentation HR 43 bpm.  She denies any lightheadedness, dizziness or syncope.  The EKG done that same day in the ER showed SR with HR 68 bpm.      Past Medical History:  She has a past medical history of Adenocarcinoma, lung (Multi), Atrial fibrillation (Multi), CHF (congestive heart failure) (Multi), Chronic diastolic (congestive) heart failure (Multi), CLL (chronic lymphocytic leukemia) (Multi), COPD (chronic obstructive pulmonary disease) (Multi), Hemorrhage of anus and rectum (09/19/2016), Hemorrhage of anus and rectum (04/10/2017), Hyperlipidemia, Neuropathy, Other specified disorders of gingiva and edentulous alveolar ridge (04/10/2017), Pain in left foot (03/22/2017), Personal history of (healed) traumatic fracture, Personal history of leukemia, Personal history of Methicillin resistant Staphylococcus aureus infection, Personal history of other diseases of the circulatory system, Personal history of other diseases of the circulatory system, Personal history of other diseases of the digestive system, Personal history of other diseases of the digestive system, Personal history of other diseases of the musculoskeletal system and connective tissue, Personal history of other diseases of the musculoskeletal system and connective tissue, Personal history of other  diseases of the musculoskeletal system and connective tissue (06/17/2019), Personal history of other diseases of the nervous system and sense organs, Personal history of other diseases of the respiratory system, Personal history of other diseases of the respiratory system, Personal history of other diseases of the respiratory system, Personal history of other endocrine, nutritional and metabolic disease, Personal history of other endocrine, nutritional and metabolic disease, Personal history of other endocrine, nutritional and metabolic disease, and Unspecified injury of right ankle, initial encounter (04/10/2017).    Past Surgical History:  She has a past surgical history that includes Hip surgery (01/12/2016); Gallbladder surgery (01/12/2016); Knee surgery (01/12/2016); Other surgical history (04/22/2019); Other surgical history (09/18/2019); Other surgical history (09/18/2019); Other surgical history (09/18/2019); Other surgical history (09/18/2019); Other surgical history (09/18/2019); and CT guided percutaneous biopsy lung (7/19/2023).      Social History:  She reports that she quit smoking about 8 years ago. Her smoking use included cigarettes. She started smoking about 48 years ago. She has a 40 pack-year smoking history. She has been exposed to tobacco smoke. She has never used smokeless tobacco. She reports that she does not currently use alcohol. She reports current drug use. Drug: Marijuana.    Family History:  Family History   Problem Relation Name Age of Onset    Cancer Mother      Drug abuse Mother      Mental illness Mother      No Known Problems Father      Cancer Sister          Allergies:  Adhesive tape-silicones, Aspirin, Hydrocodone-acetaminophen, Morphine, Naproxen, Niacin, Nsaids (non-steroidal anti-inflammatory drug), Opioids-meperidine and related, Tizanidine, and Other    Review of Systems   Constitutional: Negative for chills and fever.   Cardiovascular:  Positive for dyspnea on exertion  (chronic) and leg swelling (intermittent). Negative for chest pain, irregular heartbeat, near-syncope, orthopnea, palpitations and syncope.   Respiratory:  Negative for cough, shortness of breath and wheezing.    Musculoskeletal:  Positive for myalgias (right ribs).   Gastrointestinal:  Positive for constipation (alternating constipation and diarrhea - states this is chronic and she has IBS), diarrhea and hemorrhoids (intermittent bleeding). Negative for hematochezia, melena, nausea and vomiting.   Genitourinary:  Negative for hematuria.   Psychiatric/Behavioral:  Negative for altered mental status.        Objective      Vitals reviewed.   Constitutional:       Appearance: Not in distress.   Neck:      Vascular: No carotid bruit.   Pulmonary:      Effort: Pulmonary effort is normal.      Breath sounds: Normal breath sounds.   Cardiovascular:      PMI at left midclavicular line. Normal rate. Regular rhythm. S1 with normal intensity. S2 with normal intensity.       Murmurs: There is no murmur.   Edema:     Peripheral edema absent.   Abdominal:      General: Bowel sounds are normal.   Neurological:      Mental Status: Alert and oriented to person, place and time.         Current Outpatient Medications   Medication Instructions    albuterol (ProAir HFA) 90 mcg/actuation inhaler 2 puffs, inhalation, 4 times daily RT    apixaban (ELIQUIS) 5 mg, oral, 2 times daily    atorvastatin (LIPITOR) 20 mg, oral, Nightly    cephalexin (KEFLEX) 500 mg, oral, 4 times daily    cloNIDine (CATAPRES) 0.1 mg, oral, Every 8 hours    dexlansoprazole (Dexilant) 60 mg DR capsule take 1 capsule by mouth every morning TAKE BEFORE MEALS    DULoxetine (CYMBALTA) 60 mg, oral, Daily, Do not crush or chew.    famotidine (Pepcid) 40 mg tablet take 1 tablet by mouth once daily    folic acid (Folvite) 1 mg tablet 1 tablet, oral, Daily    gabapentin (NEURONTIN) 300 mg, oral, 3 times daily    levoFLOXacin (LEVAQUIN) 750 mg, oral, Daily    lidocaine  "(Lidoderm) 5 % patch 1 patch, transdermal, Daily, Remove & discard patch within 12 hours or as directed by MD.    Linzess 72 mcg capsule 1 capsule, oral, Daily    methotrexate (Trexall) 2.5 mg tablet 6 tablets, oral, Once Weekly    metoprolol succinate XL (TOPROL-XL) 50 mg, oral, Daily, Do not crush or chew.    oxyCODONE (ROXICODONE) 5 mg, oral, Every 8 hours PRN    propafenone (RYTHMOL) 225 mg, oral, Every 8 hours    roflumilast (DALIRESP) 500 mcg, oral, Daily    sulfaSALAzine (AZULFIDINE) 500 mg, oral, 2 times daily    traZODone (DESYREL) 50 mg, oral, Nightly PRN    Trelegy Ellipta 200-62.5-25 mcg blister with device 1 puff, inhalation, Daily        Last Labs:  CBC -  Lab Results   Component Value Date    WBC 19.9 (H) 04/29/2024    HGB 13.0 04/29/2024    HCT 40.9 04/29/2024     (H) 04/29/2024     04/29/2024       RENAL FUNCTION PANEL -   Lab Results   Component Value Date    GLUCOSE 101 (H) 04/29/2024     04/29/2024    K 4.8 04/29/2024     04/29/2024    CO2 28 04/29/2024    ANIONGAP 12 04/29/2024    BUN 12 04/29/2024    CREATININE 0.81 04/29/2024    CALCIUM 9.4 04/29/2024    ALBUMIN 3.8 04/26/2024        CMP -  Lab Results   Component Value Date    CALCIUM 9.4 04/29/2024    PROT 6.5 04/26/2024    ALBUMIN 3.8 04/26/2024    AST 13 04/26/2024    ALT 12 04/26/2024    ALKPHOS 68 04/26/2024    BILITOT 0.4 04/26/2024       LIPID PANEL -   Lab Results   Component Value Date    CHOL 151 06/19/2023    TRIG 113 06/19/2023    HDL 71.7 06/19/2023    CHHDL 2.1 06/19/2023    LDLF 57 06/19/2023    VLDL 23 06/19/2023     No results found for: \"LDLCALC\"    Lab Results   Component Value Date    BNP 66 06/19/2023    HGBA1C 7.1 (H) 12/11/2023    HGBA1C 7.4 (A) 06/27/2023       Lab Results   Component Value Date    TSH 5.74 (H) 12/11/2023       No results found for this or any previous visit.     Last Cardiology Tests:    Stress test 10/12/2022 showed no clinical or electrocardiographic evidence for ischemia " at maximal infusion. No ECG changes from baseline. Small fixed perfusion defect in the apical wall. No reversible perfusion defect seen. Low probability of ischemia. Calculated EF is 63%.     TTE 4/7/2022 showed LV systolic function is normal with an EF of 60 to 65%. Moderate reduced RV systolic function.    Visit Vitals  /76 (BP Location: Left arm, Patient Position: Sitting, BP Cuff Size: Adult)   Pulse 56   Ht 1.524 m (5')   Wt 90.7 kg (200 lb)   SpO2 96%   BMI 39.06 kg/m²   OB Status Hysterectomy   Smoking Status Former   BSA 1.96 m²       Assessment/Plan   The primary encounter diagnosis was Atrial fibrillation by electrocardiogram (Multi). Diagnoses of Chronic diastolic heart failure (Multi) and Hyperlipidemia, unspecified hyperlipidemia type were also pertinent to this visit.    1. Afib; aflutter s/p AFL RFA  [id number] score is 4  Continue apixaban 5 mg twice daily  Continue metoprolol succinate 50 mg daily   On propafenone 225 mg every 8 hours -  managed by EP, has f/u with TYLOR Meneses June 2024  Checking 14 day Holter monitor as in #8 d/t bradycardia     2. Chronic diastolic heart failure  Compensated on exam  Continue metoprolol succinate 50 mg daily  TTE April 2022 with LVEF 60 to 65%     3. Coronary artery Calcification/Aortic calcification on CT chest  Need to achieve LDL goal <100   Stress test October 2022 with low probability of ischemia     4. Dyslipidemia  Goal LDL <100. She is at goal.   Continue atorvastatin 20 mg daily   Check fasting lipid panel June 2024 - previously ordered     5. COPD  Management per pulmonology - Dr. Connelly    6. Chronic lymphocytic leukemia  Management per hematology/oncology    7. Lung adenocarcinoma  Management per hematology/oncology - Dr. Mead - radiation oncologist and ADRIANA Jimenez    8. ? Bradycardia  HR documented as 43 bpm at office visit 4/26/24 with Dr. Mendez.  EKG done that same day in the ED was SR with HR 68 bpm.    EKG today in our office SR with HR  83 bpm.  Denies any lightheadedness, dizziness or syncope  The patient did states she had a recent fall, but that she was leaning off her bed to  a remote she dropped and then fell off the bed.  No LOC.  Check 14 day Holter monitor.    If she has significant bradycardia or heart block on Holter, can decrease metoprolol dose.  She has f/u with EP in June which she should keep    Mary Gonzalez, TERESE-CNP

## 2024-05-03 ENCOUNTER — ANCILLARY PROCEDURE (OUTPATIENT)
Dept: CARDIOLOGY | Facility: CLINIC | Age: 74
End: 2024-05-03
Payer: MEDICARE

## 2024-05-03 ENCOUNTER — OFFICE VISIT (OUTPATIENT)
Dept: CARDIOLOGY | Facility: CLINIC | Age: 74
End: 2024-05-03
Payer: MEDICARE

## 2024-05-03 VITALS
BODY MASS INDEX: 39.27 KG/M2 | WEIGHT: 200 LBS | DIASTOLIC BLOOD PRESSURE: 76 MMHG | SYSTOLIC BLOOD PRESSURE: 140 MMHG | HEART RATE: 56 BPM | HEIGHT: 60 IN | OXYGEN SATURATION: 96 %

## 2024-05-03 DIAGNOSIS — R00.1 BRADYCARDIA: ICD-10-CM

## 2024-05-03 DIAGNOSIS — I48.91 ATRIAL FIBRILLATION BY ELECTROCARDIOGRAM (MULTI): Primary | ICD-10-CM

## 2024-05-03 DIAGNOSIS — E78.5 HYPERLIPIDEMIA, UNSPECIFIED HYPERLIPIDEMIA TYPE: ICD-10-CM

## 2024-05-03 DIAGNOSIS — I50.32 CHRONIC DIASTOLIC HEART FAILURE (MULTI): ICD-10-CM

## 2024-05-03 PROCEDURE — 93248 EXT ECG>7D<15D REV&INTERPJ: CPT | Performed by: INTERNAL MEDICINE

## 2024-05-03 PROCEDURE — 1159F MED LIST DOCD IN RCRD: CPT | Performed by: NURSE PRACTITIONER

## 2024-05-03 PROCEDURE — 3078F DIAST BP <80 MM HG: CPT | Performed by: NURSE PRACTITIONER

## 2024-05-03 PROCEDURE — 93246 EXT ECG>7D<15D RECORDING: CPT | Performed by: INTERNAL MEDICINE

## 2024-05-03 PROCEDURE — 93000 ELECTROCARDIOGRAM COMPLETE: CPT | Performed by: INTERNAL MEDICINE

## 2024-05-03 PROCEDURE — 3008F BODY MASS INDEX DOCD: CPT | Performed by: NURSE PRACTITIONER

## 2024-05-03 PROCEDURE — 1036F TOBACCO NON-USER: CPT | Performed by: NURSE PRACTITIONER

## 2024-05-03 PROCEDURE — 3077F SYST BP >= 140 MM HG: CPT | Performed by: NURSE PRACTITIONER

## 2024-05-03 PROCEDURE — 1160F RVW MEDS BY RX/DR IN RCRD: CPT | Performed by: NURSE PRACTITIONER

## 2024-05-03 PROCEDURE — 99214 OFFICE O/P EST MOD 30 MIN: CPT | Performed by: NURSE PRACTITIONER

## 2024-05-03 SDOH — ECONOMIC STABILITY: FOOD INSECURITY: WITHIN THE PAST 12 MONTHS, YOU WORRIED THAT YOUR FOOD WOULD RUN OUT BEFORE YOU GOT MONEY TO BUY MORE.: NEVER TRUE

## 2024-05-03 SDOH — ECONOMIC STABILITY: FOOD INSECURITY: WITHIN THE PAST 12 MONTHS, THE FOOD YOU BOUGHT JUST DIDN'T LAST AND YOU DIDN'T HAVE MONEY TO GET MORE.: NEVER TRUE

## 2024-05-03 ASSESSMENT — ENCOUNTER SYMPTOMS: MYALGIAS: 1

## 2024-05-14 ENCOUNTER — OFFICE VISIT (OUTPATIENT)
Dept: PRIMARY CARE | Facility: CLINIC | Age: 74
End: 2024-05-14
Payer: MEDICARE

## 2024-05-14 VITALS
RESPIRATION RATE: 16 BRPM | SYSTOLIC BLOOD PRESSURE: 116 MMHG | WEIGHT: 206 LBS | BODY MASS INDEX: 40.44 KG/M2 | OXYGEN SATURATION: 96 % | HEIGHT: 60 IN | DIASTOLIC BLOOD PRESSURE: 78 MMHG | HEART RATE: 56 BPM

## 2024-05-14 DIAGNOSIS — B37.2 CANDIDAL SKIN INFECTION: Primary | ICD-10-CM

## 2024-05-14 DIAGNOSIS — B37.2 CANDIDIASIS OF SKIN: ICD-10-CM

## 2024-05-14 PROBLEM — F32.1 CURRENT MODERATE EPISODE OF MAJOR DEPRESSIVE DISORDER WITHOUT PRIOR EPISODE (MULTI): Status: ACTIVE | Noted: 2024-05-14

## 2024-05-14 PROCEDURE — 99213 OFFICE O/P EST LOW 20 MIN: CPT

## 2024-05-14 PROCEDURE — 1036F TOBACCO NON-USER: CPT

## 2024-05-14 PROCEDURE — 3078F DIAST BP <80 MM HG: CPT

## 2024-05-14 PROCEDURE — 1159F MED LIST DOCD IN RCRD: CPT

## 2024-05-14 PROCEDURE — 3074F SYST BP LT 130 MM HG: CPT

## 2024-05-14 PROCEDURE — 3008F BODY MASS INDEX DOCD: CPT

## 2024-05-14 PROCEDURE — 1160F RVW MEDS BY RX/DR IN RCRD: CPT

## 2024-05-14 RX ORDER — NYSTATIN 100000 U/G
CREAM TOPICAL 2 TIMES DAILY
Qty: 30 G | Refills: 1 | Status: SHIPPED | OUTPATIENT
Start: 2024-05-14 | End: 2024-05-28

## 2024-05-14 RX ORDER — METHOTREXATE 2.5 MG/1
15 TABLET ORAL
Qty: 12 TABLET | Refills: 0 | Status: CANCELLED | OUTPATIENT
Start: 2024-05-14

## 2024-05-14 ASSESSMENT — ENCOUNTER SYMPTOMS
EYES NEGATIVE: 1
CARDIOVASCULAR NEGATIVE: 1
DEPRESSION: 0
HEMATOLOGIC/LYMPHATIC NEGATIVE: 1
GASTROINTESTINAL NEGATIVE: 1
ENDOCRINE NEGATIVE: 1
RESPIRATORY NEGATIVE: 1
LOSS OF SENSATION IN FEET: 0
CONSTITUTIONAL NEGATIVE: 1
ALLERGIC/IMMUNOLOGIC NEGATIVE: 1
MUSCULOSKELETAL NEGATIVE: 1
NEUROLOGICAL NEGATIVE: 1
PSYCHIATRIC NEGATIVE: 1
OCCASIONAL FEELINGS OF UNSTEADINESS: 0

## 2024-05-14 ASSESSMENT — ANXIETY QUESTIONNAIRES
6. BECOMING EASILY ANNOYED OR IRRITABLE: NOT AT ALL
GAD7 TOTAL SCORE: 0
5. BEING SO RESTLESS THAT IT IS HARD TO SIT STILL: NOT AT ALL
1. FEELING NERVOUS, ANXIOUS, OR ON EDGE: NOT AT ALL
7. FEELING AFRAID AS IF SOMETHING AWFUL MIGHT HAPPEN: NOT AT ALL
2. NOT BEING ABLE TO STOP OR CONTROL WORRYING: NOT AT ALL
3. WORRYING TOO MUCH ABOUT DIFFERENT THINGS: NOT AT ALL
IF YOU CHECKED OFF ANY PROBLEMS ON THIS QUESTIONNAIRE, HOW DIFFICULT HAVE THESE PROBLEMS MADE IT FOR YOU TO DO YOUR WORK, TAKE CARE OF THINGS AT HOME, OR GET ALONG WITH OTHER PEOPLE: NOT DIFFICULT AT ALL
4. TROUBLE RELAXING: NOT AT ALL

## 2024-05-14 ASSESSMENT — COLUMBIA-SUICIDE SEVERITY RATING SCALE - C-SSRS
6. HAVE YOU EVER DONE ANYTHING, STARTED TO DO ANYTHING, OR PREPARED TO DO ANYTHING TO END YOUR LIFE?: NO
1. IN THE PAST MONTH, HAVE YOU WISHED YOU WERE DEAD OR WISHED YOU COULD GO TO SLEEP AND NOT WAKE UP?: NO
2. HAVE YOU ACTUALLY HAD ANY THOUGHTS OF KILLING YOURSELF?: NO

## 2024-05-14 NOTE — PROGRESS NOTES
Subjective   Patient ID: Alysia Magdaleno is a 73 y.o. female who presents for ER Follow-up.    ER Follow-up     Patient here for follow up from ED visit after having a fall at home went to the ED 4/26/24, was sent home after testing showed no injury, did have a slight UTI sent home on cephalexin.  Went back to the ED for right sided pain on 4/29/24.  Was found to have pneumonia at this visit was release on Levaquin for 7 days to treat pneumonia and UTI, they stopped cephalexin.  No complaints of SOB, fatigue at times.  No fever, no cough, congestion.  No c/o burning with urination, urinary frequency or feeling of incomplete voiding.    Has rash under both breast and in groin. Red and itchy.    Patient request refill on methotrexate, in noted patient had requested refill from Dr.Riaz Rivero and he wanted a follow up scheduled and refused the medication request.     No other concerns.     Review of Systems   Constitutional: Negative.    HENT: Negative.     Eyes: Negative.    Respiratory: Negative.     Cardiovascular: Negative.    Gastrointestinal: Negative.    Endocrine: Negative.    Genitourinary: Negative.    Musculoskeletal: Negative.    Skin: Negative.    Allergic/Immunologic: Negative.    Neurological: Negative.    Hematological: Negative.    Psychiatric/Behavioral: Negative.     All other systems reviewed and are negative.      Objective   /78   Pulse 56   Resp 16   Ht 1.524 m (5')   Wt 93.4 kg (206 lb)   SpO2 96%   BMI 40.23 kg/m²     Physical Exam  Constitutional:       Appearance: Normal appearance.   HENT:      Head: Normocephalic and atraumatic.      Nose: Nose normal.      Mouth/Throat:      Mouth: Mucous membranes are moist.      Pharynx: Oropharynx is clear.   Eyes:      Pupils: Pupils are equal, round, and reactive to light.   Cardiovascular:      Rate and Rhythm: Normal rate and regular rhythm.      Pulses: Normal pulses.      Heart sounds: Normal heart sounds.   Pulmonary:      Effort:  Pulmonary effort is normal.      Breath sounds: Normal breath sounds.   Abdominal:      General: Bowel sounds are normal.      Palpations: Abdomen is soft.   Musculoskeletal:         General: Normal range of motion.      Cervical back: Normal range of motion.   Skin:     General: Skin is warm and dry.             Comments: Under bilateral breast and bilateral groin area redness and irritation to folds.  No open areas noted.    Neurological:      General: No focal deficit present.      Mental Status: She is alert and oriented to person, place, and time.   Psychiatric:         Mood and Affect: Mood normal.         Behavior: Behavior normal.         Thought Content: Thought content normal.         Judgment: Judgment normal.       Assessment/Plan   Problem List Items Addressed This Visit    None  Visit Diagnoses       Candidal skin infection    -  Primary    Relevant Medications    nystatin (Mycostatin) cream    Candidiasis of skin              Make follow up with Dr. Rivero, I do not feel comfortable refilling medication with him refusing refill due to wanting your to schedule an appointment.     Nystatin cream sent to your pharmacy for under both breast and under your groin folds to help with your rash.     Follow up as needed.    Patient was identified as a fall risk. Risk prevention instructions provided.

## 2024-05-14 NOTE — PATIENT INSTRUCTIONS
Assessment/Plan   Problem List Items Addressed This Visit    None  Visit Diagnoses       Candidal skin infection    -  Primary    Relevant Medications    nystatin (Mycostatin) cream    Candidiasis of skin              Make follow up with Dr. Rivero, I do not feel comfortable refilling medication with him refusing refill due to wanting your to schedule an appointment.     Nystatin cream sent to your pharmacy for under both breast and under your groin folds to help with your rash.     Follow up as needed.         Ways to Help Prevent Falls at Home    Quick Tips   ? Ask for help if you need it. Most people want to help!   ? Get up slowly after sitting or laying down   ? Wear a medical alert device or keep cell phone in your pocket   ? Use night lights, especially areas near a bathroom   ? Keep the items you use often within reach on a small stool or end table   ? Use an assistive device such as walker or cane, as directed by provider/physical therapy   ? Use a non-slip mat and grab bars in your bathroom. Look for home health sections for best options     Other Areas to Focus On   ? Exercise and nutrition: Regular exercise or taking a falls prevention class are great ways improve strength and balance. Don’t forget to stay hydrated and bring a snack!   ? Medicine side effects: Some medicines can make you sleepy or dizzy, which could cause a fall. Ask your healthcare provider about the side effects your medicines could cause. Be sure to let them know if you take any vitamins or supplements as well.   ? Tripping hazards: Remove items you could trip on, such as loose mats, rugs, cords, and clutter. Wear closed toe shoes with rubber soles.   ? Health and wellness: Get regular checkups with your healthcare provider, plus routine vision and hearing screenings. Talk with your healthcare provider about:   o Your medicines and the possible side effects - bring them in a bag if that is easier!   o Problems with balance or feeling  dizzy   o Ways to promote bone health, such as Vitamin D and calcium supplements   o Questions or concerns about falling     *Ask your healthcare team if you have questions     ©[facility], 2022

## 2024-05-29 LAB — BODY SURFACE AREA: 1.96 M2

## 2024-06-06 ENCOUNTER — OFFICE VISIT (OUTPATIENT)
Dept: PRIMARY CARE | Facility: CLINIC | Age: 74
End: 2024-06-06
Payer: MEDICARE

## 2024-06-06 VITALS
SYSTOLIC BLOOD PRESSURE: 122 MMHG | HEIGHT: 60 IN | DIASTOLIC BLOOD PRESSURE: 74 MMHG | HEART RATE: 77 BPM | BODY MASS INDEX: 39.78 KG/M2 | OXYGEN SATURATION: 94 % | WEIGHT: 202.6 LBS

## 2024-06-06 DIAGNOSIS — J41.8 MIXED SIMPLE AND MUCOPURULENT CHRONIC BRONCHITIS (MULTI): ICD-10-CM

## 2024-06-06 DIAGNOSIS — I50.22 CHRONIC SYSTOLIC CONGESTIVE HEART FAILURE (MULTI): ICD-10-CM

## 2024-06-06 DIAGNOSIS — F33.9 DEPRESSION, RECURRENT (CMS-HCC): ICD-10-CM

## 2024-06-06 DIAGNOSIS — K21.9 GERD WITHOUT ESOPHAGITIS: ICD-10-CM

## 2024-06-06 DIAGNOSIS — M05.9 SEROPOSITIVE RHEUMATOID ARTHRITIS (MULTI): ICD-10-CM

## 2024-06-06 DIAGNOSIS — E66.01 SEVERE OBESITY (MULTI): ICD-10-CM

## 2024-06-06 DIAGNOSIS — C34.90 ADENOCARCINOMA OF LUNG, UNSPECIFIED LATERALITY (MULTI): ICD-10-CM

## 2024-06-06 DIAGNOSIS — E03.9 ACQUIRED HYPOTHYROIDISM: ICD-10-CM

## 2024-06-06 DIAGNOSIS — F17.200 CURRENT SMOKER: Primary | ICD-10-CM

## 2024-06-06 DIAGNOSIS — J43.2 CENTRILOBULAR EMPHYSEMA (MULTI): ICD-10-CM

## 2024-06-06 DIAGNOSIS — M05.9 RHEUMATOID ARTHRITIS WITH POSITIVE RHEUMATOID FACTOR, INVOLVING UNSPECIFIED SITE (MULTI): ICD-10-CM

## 2024-06-06 DIAGNOSIS — C91.10 CHRONIC LYMPHOCYTIC LEUKEMIA (MULTI): ICD-10-CM

## 2024-06-06 DIAGNOSIS — E11.9 TYPE 2 DIABETES MELLITUS WITHOUT COMPLICATION, WITHOUT LONG-TERM CURRENT USE OF INSULIN (MULTI): ICD-10-CM

## 2024-06-06 DIAGNOSIS — F32.1 CURRENT MODERATE EPISODE OF MAJOR DEPRESSIVE DISORDER WITHOUT PRIOR EPISODE (MULTI): ICD-10-CM

## 2024-06-06 PROCEDURE — 3074F SYST BP LT 130 MM HG: CPT | Performed by: FAMILY MEDICINE

## 2024-06-06 PROCEDURE — 1036F TOBACCO NON-USER: CPT | Performed by: FAMILY MEDICINE

## 2024-06-06 PROCEDURE — 99214 OFFICE O/P EST MOD 30 MIN: CPT | Performed by: FAMILY MEDICINE

## 2024-06-06 PROCEDURE — 1159F MED LIST DOCD IN RCRD: CPT | Performed by: FAMILY MEDICINE

## 2024-06-06 PROCEDURE — 3008F BODY MASS INDEX DOCD: CPT | Performed by: FAMILY MEDICINE

## 2024-06-06 PROCEDURE — 3078F DIAST BP <80 MM HG: CPT | Performed by: FAMILY MEDICINE

## 2024-06-06 RX ORDER — DEXLANSOPRAZOLE 60 MG/1
CAPSULE, DELAYED RELEASE ORAL
Qty: 90 CAPSULE | Refills: 3 | Status: SHIPPED | OUTPATIENT
Start: 2024-06-06

## 2024-06-06 ASSESSMENT — ENCOUNTER SYMPTOMS
OCCASIONAL FEELINGS OF UNSTEADINESS: 0
DEPRESSION: 0
LOSS OF SENSATION IN FEET: 0

## 2024-06-06 ASSESSMENT — PATIENT HEALTH QUESTIONNAIRE - PHQ9
2. FEELING DOWN, DEPRESSED OR HOPELESS: NOT AT ALL
SUM OF ALL RESPONSES TO PHQ9 QUESTIONS 1 AND 2: 0
1. LITTLE INTEREST OR PLEASURE IN DOING THINGS: NOT AT ALL

## 2024-06-06 NOTE — PROGRESS NOTES
Subjective   Patient ID: Alysia Magdaleno is a 73 y.o. female.    HPI  4/29/2024, she fell and sustained injury and had pneumonia,. She is mostly better but has improved. 4/26 visit to [facility] and 4/29 visit here. A new right upper lobe consolidation was present. She has a follow up CT this month and will follow up with oncology. She is here for me for six month follow up. Fell 4/2024  Review of Systems   Musculoskeletal:         Rib pain   All other systems reviewed and are negative.      Objective   Physical Exam  Vitals and nursing note reviewed.   Constitutional:       Appearance: Normal appearance.   HENT:      Head: Normocephalic and atraumatic.      Right Ear: Tympanic membrane normal.      Left Ear: Tympanic membrane normal.      Nose: Nose normal.      Mouth/Throat:      Mouth: Mucous membranes are moist.      Pharynx: Oropharynx is clear.   Eyes:      Extraocular Movements: Extraocular movements intact.      Conjunctiva/sclera: Conjunctivae normal.      Pupils: Pupils are equal, round, and reactive to light.   Cardiovascular:      Rate and Rhythm: Normal rate and regular rhythm.      Pulses: Normal pulses.      Heart sounds: Normal heart sounds.   Pulmonary:      Effort: Pulmonary effort is normal.      Breath sounds: Normal breath sounds.   Abdominal:      General: Abdomen is flat. Bowel sounds are normal.      Palpations: Abdomen is soft.   Musculoskeletal:         General: Normal range of motion.      Cervical back: Normal range of motion and neck supple.   Skin:     General: Skin is warm and dry.      Capillary Refill: Capillary refill takes less than 2 seconds.   Neurological:      General: No focal deficit present.      Mental Status: She is alert and oriented to person, place, and time.   Psychiatric:         Mood and Affect: Mood normal.         Behavior: Behavior normal.         Assessment/Plan   Diagnoses and all orders for this visit:  Current smoker- about 1/2 pack per day   Acquired  hypothyroidism- no thyroid medication  -     Follow Up In [facility] - PCP - Established  -     TSH with reflex to Free T4 if abnormal; Future  GERD without esophagitis  -     dexlansoprazole (Dexilant) 60 mg DR capsule; take 1 capsule by mouth every morning TAKE BEFORE MEALS  -     CBC and Auto Differential; Future  Rheumatoid arthritis with positive rheumatoid factor, involving unspecified site (Multi)- patient would like to come here for methotrexate as ambulation is a challenge. Will try to refill.   Mixed simple and mucopurulent chronic bronchitis (Multi)- stable   Current moderate episode of major depressive disorder without prior episode (Multi)- duloxetine.   Severe obesity (Multi)  Type 2 diabetes mellitus without complication, without long-term current use of insulin (Multi)  -     Hemoglobin A1C; Future  Depression, recurrent (CMS-HCC)  Centrilobular emphysema (Multi)  Seropositive rheumatoid arthritis (Multi)  Adenocarcinoma of lung, unspecified laterality (Multi)- has follow up CT for abnormal lung CT when she had pneumonia.   -     TSH with reflex to Free T4 if abnormal; Future  Chronic lymphocytic leukemia (Multi)- WBC is continually elevated.   -     CBC and Auto Differential; Future  Chronic systolic congestive heart failure (Multi)  -     Comprehensive Metabolic Panel; Future  -     Lipid Panel; Future  Propafenone, Clonidine, and eliquis.     Follow up six months with labwork before.         Patient was identified as a fall risk. Risk prevention instructions provided.

## 2024-06-11 ENCOUNTER — APPOINTMENT (OUTPATIENT)
Dept: RADIOLOGY | Facility: HOSPITAL | Age: 74
End: 2024-06-11
Payer: MEDICARE

## 2024-06-11 ENCOUNTER — LAB (OUTPATIENT)
Dept: LAB | Facility: LAB | Age: 74
End: 2024-06-11
Payer: MEDICARE

## 2024-06-11 DIAGNOSIS — E78.5 DYSLIPIDEMIA: ICD-10-CM

## 2024-06-11 LAB
CHOLEST SERPL-MCNC: 159 MG/DL (ref 0–199)
CHOLESTEROL/HDL RATIO: 2.2
HDLC SERPL-MCNC: 71.2 MG/DL
LDLC SERPL CALC-MCNC: 64 MG/DL
NON HDL CHOLESTEROL: 88 MG/DL (ref 0–149)
TRIGL SERPL-MCNC: 118 MG/DL (ref 0–149)
VIT B12 SERPL-MCNC: 358 PG/ML (ref 211–911)
VLDL: 24 MG/DL (ref 0–40)

## 2024-06-11 PROCEDURE — 36415 COLL VENOUS BLD VENIPUNCTURE: CPT

## 2024-06-11 PROCEDURE — 82607 VITAMIN B-12: CPT

## 2024-06-11 PROCEDURE — 80061 LIPID PANEL: CPT

## 2024-06-12 ENCOUNTER — HOSPITAL ENCOUNTER (OUTPATIENT)
Dept: RADIOLOGY | Facility: HOSPITAL | Age: 74
Discharge: HOME | End: 2024-06-12
Payer: MEDICARE

## 2024-06-12 DIAGNOSIS — C34.92 ADENOCARCINOMA OF LEFT LUNG (MULTI): ICD-10-CM

## 2024-06-12 PROCEDURE — 71260 CT THORAX DX C+: CPT

## 2024-06-12 PROCEDURE — 2550000001 HC RX 255 CONTRASTS: Performed by: STUDENT IN AN ORGANIZED HEALTH CARE EDUCATION/TRAINING PROGRAM

## 2024-06-17 DIAGNOSIS — M79.7 FIBROMYALGIA: ICD-10-CM

## 2024-06-17 RX ORDER — DULOXETIN HYDROCHLORIDE 60 MG/1
60 CAPSULE, DELAYED RELEASE ORAL DAILY
Qty: 30 CAPSULE | Refills: 2 | OUTPATIENT
Start: 2024-06-17

## 2024-06-18 ENCOUNTER — HOSPITAL ENCOUNTER (OUTPATIENT)
Dept: RADIATION ONCOLOGY | Facility: CLINIC | Age: 74
Setting detail: RADIATION/ONCOLOGY SERIES
Discharge: HOME | End: 2024-06-18
Payer: MEDICARE

## 2024-06-18 VITALS
TEMPERATURE: 97.7 F | WEIGHT: 202.1 LBS | SYSTOLIC BLOOD PRESSURE: 143 MMHG | DIASTOLIC BLOOD PRESSURE: 79 MMHG | OXYGEN SATURATION: 96 % | RESPIRATION RATE: 20 BRPM | HEART RATE: 88 BPM | BODY MASS INDEX: 39.47 KG/M2

## 2024-06-18 DIAGNOSIS — C34.32 MALIGNANT NEOPLASM OF LOWER LOBE OF LEFT LUNG (MULTI): Primary | ICD-10-CM

## 2024-06-18 PROCEDURE — 99214 OFFICE O/P EST MOD 30 MIN: CPT | Performed by: STUDENT IN AN ORGANIZED HEALTH CARE EDUCATION/TRAINING PROGRAM

## 2024-06-18 ASSESSMENT — PATIENT HEALTH QUESTIONNAIRE - PHQ9
SUM OF ALL RESPONSES TO PHQ9 QUESTIONS 1 & 2: 0
2. FEELING DOWN, DEPRESSED OR HOPELESS: NOT AT ALL
1. LITTLE INTEREST OR PLEASURE IN DOING THINGS: NOT AT ALL

## 2024-06-18 ASSESSMENT — ENCOUNTER SYMPTOMS
DEPRESSION: 0
OCCASIONAL FEELINGS OF UNSTEADINESS: 1
LOSS OF SENSATION IN FEET: 1

## 2024-06-18 ASSESSMENT — PAIN SCALES - GENERAL: PAINLEVEL: 2

## 2024-06-18 NOTE — PROGRESS NOTES
Radiation Oncology Nursing Note    Pain: The patient's current pain level was assessed.  They report currently having a pain of 2 out of 10.  They feel their pain is under control without the use of pain medications.    Review of Systems:  Review of Systems - Oncology

## 2024-06-18 NOTE — PROGRESS NOTES
Radiation Oncology Follow-Up    Patient Name:  Alysia Magdaleno  MRN:  [id number]  :  1950    Referring Provider: No ref. provider found  Primary Care Provider: Xi Dwyer MD  Care Team: Patient Care Team:  Xi Dwyer MD as PCP - General (Family Medicine)  Xi wDyer MD as PCP [facility]  Darrell Mendez MD as Medical Oncologist (Hematology and Oncology)    Date of Service: 2024    SUBJECTIVE  History of Present Illness:  Alysia Magdaleno is a 73 y.o. female who was previously seen at the [facility] Department of Radiation Oncology for stage IA3 [id number] left lower lobe adenocarcinoma s/p definitive SBRT 55 Gy/5 fractions completed 23. She also has a history of CLL.    PET/CT on 3/14/24 showed focal uptake in the LLL mass that was increased compared to 2023 (SUV 5.1 compared 1.4). There was no evidence of musa or distant metastases. She underwent CT-guided biopsy of the LLL mass on 24 and pathology showed adenocarcinoma (lepidic pattern). NGS testing could not be done because of insufficient tumor cellularity. Her imaging and pathology were discussed at thoracic tumor board, and it was felt that the scant adenocarcinoma cells in the setting of fibrosis may not represent true local recurrence given that studies of lung resection following SBRT showed less than complete response even months later. The recommendation was for repeat chest imaging. No re-irradiation or salvage systemic therapy was recommended.    On 24, she presented to the ED after falling out of bed onto her right side without hitting her head. CT head w/o contrast showed no acute findings, and CT chest showed no acute fractures. The LLL mass appeared similar to the prior PET/CT. She returned to the ED on 24 with continuing right-sided chest and RUQ pain, and CT c/a/p showed a new RUL GGO. She was treated for UTI and pneumonia with antibiotics and  discharged.    Repeat CT chest on 6/12/24 the treated LLL mass measuring about 4.2 cm, decreased from 4.8 cm in 2/2024. There were new patchy ill-defined GGOs in the anterior CHELY that were likely infectious or inflammatory. There were no new lung nodules or thoracic adenopathy. Healing fractures of the right 3rd through 6th ribs were noted, and these did not appear pathologic.    Today, she complains of some improving pain at the right side. She has chronic neck pain that is bothering her now. She denies headaches, vision changes, nausea/vomiting, loss of appetite, or focal weakness/numbness. She is still walking independently at home and takes care of most ADLs.    Treatment Rendered:   Left lung SBRT 55 Gy/5 fractions completed 9/20/23       Review of Systems:   Review of Systems   All other systems reviewed and are negative.      Performance Status:   The Karnofsky performance scale today is 70, Cares for self; unable to carry on normal activity or to do active work (ECOG equivalent 1).       OBJECTIVE  Vital Signs:  /79 (BP Location: Left arm, Patient Position: Sitting, BP Cuff Size: Adult)   Pulse 88   Temp 36.5 °C (97.7 °F) (Temporal)   Resp 20   Wt 91.7 kg (202 lb 1.6 oz)   SpO2 96%   BMI 39.47 kg/m²   Physical Exam  Constitutional:       General: She is not in acute distress.     Appearance: She is obese. She is not toxic-appearing.   HENT:      Head: Normocephalic and atraumatic.      Mouth/Throat:      Mouth: Mucous membranes are moist.      Pharynx: Oropharynx is clear. No oropharyngeal exudate or posterior oropharyngeal erythema.   Eyes:      General: No scleral icterus.     Extraocular Movements: Extraocular movements intact.      Conjunctiva/sclera: Conjunctivae normal.      Pupils: Pupils are equal, round, and reactive to light.   Cardiovascular:      Rate and Rhythm: Normal rate and regular rhythm.      Heart sounds: No murmur heard.     No friction rub. No gallop.   Pulmonary:       Effort: Pulmonary effort is normal. No respiratory distress.      Breath sounds: No stridor. No wheezing, rhonchi or rales.      Comments: Decreased breath sounds bilaterally  Musculoskeletal:         General: Normal range of motion.      Cervical back: Normal range of motion and neck supple.      Right lower leg: No edema.      Left lower leg: No edema.   Skin:     General: Skin is warm and dry.      Coloration: Skin is not jaundiced.      Findings: No lesion or rash.   Neurological:      General: No focal deficit present.      Mental Status: She is alert and oriented to person, place, and time.      Cranial Nerves: No cranial nerve deficit.      Sensory: No sensory deficit.      Motor: No weakness.      Coordination: Coordination normal.      Gait: Gait normal.   Psychiatric:         Mood and Affect: Mood normal.         Behavior: Behavior normal.            ASSESSMENT:   Alysia Magdaleno is a 73 y.o. female with stage IA3 [id number] left lower lobe adenocarcinoma s/p definitive SBRT 55 Gy/5 fractions completed 9/20/23.     Her CT c/a/p on 2/12/24 and PET/CT on 3/14/24 showed enlargement of the treated LLL mass with increased uptake concerning for local recurrence, with no evidence of musa or distant metastases. CT-guided biopsy of the mass on 4/4/24 showed scant adenocarcinoma cells in the setting of extensive fibrosis. Thoracic tumor board did not recommend salvage therapy as it was too soon to call this local recurrence. Repeat CT chest on 6/12/24 showed slight decrease in size of the LLL mass, from 4.8 cm in 2/2024 to 4.2 cm.    She recently fell from her bed and hit her right chest with some rib fractures in 4/2024, and she was treated for UTI and pneumonia. She is recovering from these and maintains fair performance status (KPS 70).    I will repeat chest CT in 3 months and see her for follow up afterwards. I do not recommend further radiation at this time given the decrease in size of the LLL mass.    NCCN  Guidelines were applicable to guide this patients treatment plan.    Bakari Mead MD

## 2024-06-22 PROBLEM — S99.911A INJURY OF RIGHT ANKLE: Status: ACTIVE | Noted: 2024-06-22

## 2024-06-22 PROBLEM — Z87.39 PERSONAL HISTORY OF OTHER DISEASES OF THE MUSCULOSKELETAL SYSTEM AND CONNECTIVE TISSUE: Status: ACTIVE | Noted: 2024-06-22

## 2024-06-22 PROBLEM — Z87.09 PERSONAL HISTORY OF OTHER DISEASES OF THE RESPIRATORY SYSTEM: Status: ACTIVE | Noted: 2024-06-22

## 2024-06-22 PROBLEM — G62.9 NEUROPATHY: Status: ACTIVE | Noted: 2024-06-22

## 2024-06-22 PROBLEM — Z87.81 PERSONAL HISTORY OF (HEALED) TRAUMATIC FRACTURE: Status: ACTIVE | Noted: 2024-06-22

## 2024-06-22 PROBLEM — K62.5 HEMORRHAGE OF ANUS AND RECTUM: Status: ACTIVE | Noted: 2024-06-22

## 2024-06-22 PROBLEM — Z87.19 PERSONAL HISTORY OF OTHER DISEASES OF THE DIGESTIVE SYSTEM: Status: ACTIVE | Noted: 2024-06-22

## 2024-06-22 PROBLEM — K06.8: Status: ACTIVE | Noted: 2024-06-22

## 2024-06-22 PROBLEM — R91.8 MULTIPLE PULMONARY NODULES: Status: ACTIVE | Noted: 2024-06-22

## 2024-06-24 DIAGNOSIS — M79.7 FIBROMYALGIA: ICD-10-CM

## 2024-06-24 RX ORDER — DULOXETIN HYDROCHLORIDE 60 MG/1
60 CAPSULE, DELAYED RELEASE ORAL DAILY
Qty: 30 CAPSULE | Refills: 2 | Status: SHIPPED | OUTPATIENT
Start: 2024-06-24

## 2024-06-24 NOTE — TELEPHONE ENCOUNTER
Pt is requesting refill of  duloxetine 60 mg 1 capsule po daily [facility] [place]                                                        LV:  1/30/24                   NV:  7/30/24                                       Pended RX to LEONARDO Trinh for transmission to pharmacy.

## 2024-07-01 NOTE — PROGRESS NOTES
Electrophysiology Follow up Visit    Alysia Magdaleno is a 73 y.o. year old female patient with    1. Paroxysmal atrial fibrillation: On Propafenone 225mg q8. Has done well on this dose with no breakthroughs.     2. Typical atrial flutter: Diagnosed when patient was admitted for Propafenone initiation. She is s/p AFL ablation.      3. HTN, obesity     4. COPD     Patient seen June 2023 and doing well from an EP point of view. No further episodes of atrial fibrillation on propafenone. We will refill medications. She is being worked up for lung nodule. Her biggest complaint now is her baseline shortness of breath as well as some leg swelling that appears to be secondary to venous insufficiency. We will continue antiarrhythmics indefinitely, continue anticoagulation indefinitely. She will follow-up with Dr. Rose as well as with us once a year.     Patient here for follow up for atrial fibrillation and propafenone monitoring.       Medical History  She has a past medical history of Adenocarcinoma, lung (Multi), Atrial fibrillation (Multi), CHF (congestive heart failure) (Multi), Chronic diastolic (congestive) heart failure (Multi), CLL (chronic lymphocytic leukemia) (Multi), COPD (chronic obstructive pulmonary disease) (Multi), Hemorrhage of anus and rectum (09/19/2016), Hemorrhage of anus and rectum (04/10/2017), Hyperlipidemia, Neuropathy, Other specified disorders of gingiva and edentulous alveolar ridge (04/10/2017), Pain in left foot (03/22/2017), Personal history of (healed) traumatic fracture, Personal history of leukemia, Personal history of Methicillin resistant Staphylococcus aureus infection, Personal history of other diseases of the circulatory system, Personal history of other diseases of the circulatory system, Personal history of other diseases of the digestive system, Personal history of other diseases of the digestive system, Personal history of other diseases of the musculoskeletal system and connective  tissue, Personal history of other diseases of the musculoskeletal system and connective tissue, Personal history of other diseases of the musculoskeletal system and connective tissue (06/17/2019), Personal history of other diseases of the nervous system and sense organs, Personal history of other diseases of the respiratory system, Personal history of other diseases of the respiratory system, Personal history of other diseases of the respiratory system, Personal history of other endocrine, nutritional and metabolic disease, Personal history of other endocrine, nutritional and metabolic disease, Personal history of other endocrine, nutritional and metabolic disease, and Unspecified injury of right ankle, initial encounter (04/10/2017).    Social History  She reports that she quit smoking about 9 years ago. Her smoking use included cigarettes. She started smoking about 49 years ago. She has a 40 pack-year smoking history. She has been exposed to tobacco smoke. She has never used smokeless tobacco. She reports that she does not currently use alcohol. She reports current drug use. Drug: Marijuana.      FamHx:   Family History   Problem Relation Name Age of Onset    Cancer Mother      Drug abuse Mother      Mental illness Mother      No Known Problems Father      Cancer Sister         Allergies   Allergen Reactions    Adhesive Tape-Silicones Itching    Aspirin Hives and Unknown    Hydrocodone-Acetaminophen Unknown     nausea    Morphine Hives    Naproxen Unknown    Niacin Unknown    Nsaids (Non-Steroidal Anti-Inflammatory Drug) Unknown and Bleeding    Opioids-Meperidine And Related Other    Tizanidine Unknown    Other Rash     gold plated metal        Outpatient Medications:  Current Outpatient Medications   Medication Instructions    albuterol (ProAir HFA) 90 mcg/actuation inhaler 2 puffs, inhalation, 4 times daily RT    apixaban (ELIQUIS) 5 mg, oral, 2 times daily    atorvastatin (LIPITOR) 20 mg, oral, Nightly     budesonide-glycopyr-formoterol (BREZTRI) 160-9-4.8 mcg/actuation HFA aerosol inhaler 2 puffs, inhalation, 2 times daily RT    cloNIDine (CATAPRES) 0.1 mg, oral, Every 8 hours    dexlansoprazole (Dexilant) 60 mg DR capsule take 1 capsule by mouth every morning TAKE BEFORE MEALS    DULoxetine (CYMBALTA) 60 mg, oral, Daily, Do not crush or chew.    famotidine (Pepcid) 40 mg tablet take 1 tablet by mouth once daily    folic acid (Folvite) 1 mg tablet 1 tablet, oral, Daily    gabapentin (NEURONTIN) 300 mg, oral, 3 times daily    lidocaine (Lidoderm) 5 % patch 1 patch, transdermal, Daily, Remove & discard patch within 12 hours or as directed by MD.    Linzess 72 mcg capsule 1 capsule, oral, Daily    methotrexate (Trexall) 2.5 mg tablet 6 tablets, oral, Once Weekly    metoprolol succinate XL (TOPROL-XL) 50 mg, oral, Daily, Do not crush or chew.    propafenone (RYTHMOL) 225 mg, oral, Every 8 hours    roflumilast (DALIRESP) 500 mcg, oral, Daily    traZODone (DESYREL) 50 mg, oral, Nightly PRN    Trelegy Ellipta 200-62.5-25 mcg blister with device 1 puff, inhalation, Daily         Last Recorded Vitals: .vital      4/26/2024     2:10 PM 4/26/2024     3:35 PM 4/29/2024     7:00 PM 5/3/2024     2:15 PM 5/14/2024     2:09 PM 6/6/2024     2:37 PM 6/18/2024     1:10 PM   Vitals   Systolic 105 126 104 140 116 122 143   Diastolic 64 71 70 76 78 74 79   Heart Rate 43 46 66 56 56 77 88   Temp 36.7 °C (98.1 °F) 36.5 °C (97.7 °F) 36.5 °C (97.7 °F)    36.5 °C (97.7 °F)   Resp 17 18 18  16  20   Height (in)  1.524 m (5') 1.524 m (5') 1.524 m (5') 1.524 m (5') 1.524 m (5')    Weight (lb) 204.81 200.62 200 200 206 202.6 202.1   BMI 37.46 kg/m2 39.18 kg/m2 39.06 kg/m2 39.06 kg/m2 40.23 kg/m2 39.57 kg/m2 39.47 kg/m2   BSA (m2) 2.02 m2 1.96 m2 1.96 m2 1.96 m2 1.99 m2 1.97 m2 1.97 m2   Visit Report Report Report  Report Report Report     Visit Vitals  OB Status Hysterectomy   Smoking Status Former        Physical Exam   Cardiac Testing Reviewed  Study(s):  Echo(April/2022):   CONCLUSIONS:   1. The left ventricular systolic function is normal with a 60-65% estimated ejection fraction.   2. There is moderately reduced right ventricular systolic function.  Monitor (May/2024):   Average heart rate 74 bpm  No atrial fibrillation  <1% SVC burden  <1% PVC burden  ECGs (reviewed and my interpretation):   ***    Lab Results   Component Value Date    WBC 19.9 (H) 04/29/2024    HGB 13.0 04/29/2024    HCT 40.9 04/29/2024     04/29/2024    ALT 12 04/26/2024    AST 13 04/26/2024     04/29/2024    K 4.8 04/29/2024     04/29/2024    CREATININE 0.81 04/29/2024    BUN 12 04/29/2024    CO2 28 04/29/2024    TSH 5.74 (H) 12/11/2023    INR 1.1 07/13/2023       Assessment      Plan        Exclusive of any other services or procedures performed, I, TERESE Freitas-CNP , spent 30 minutes in duration for this visit today.  This time consisted of chart review, obtaining history, and/or performing the exam as documented above as well as documenting the clinical information for the encounter in the electronic record, discussing treatment options, plans, and/or goals with patient, family, and/or caregiver, refilling medications, updating the electronic record, ordering medicines, lab work, imaging, referrals, and/or procedures as documented above and communicating with other [facility] professionals. I have discussed the results of laboratory, radiology, and cardiology studies with the patient and their family/caregiver.

## 2024-07-02 ENCOUNTER — APPOINTMENT (OUTPATIENT)
Dept: CARDIOLOGY | Facility: HOSPITAL | Age: 74
End: 2024-07-02
Payer: MEDICARE

## 2024-07-12 ENCOUNTER — TELEPHONE (OUTPATIENT)
Dept: PRIMARY CARE | Facility: CLINIC | Age: 74
End: 2024-07-12
Payer: MEDICARE

## 2024-07-12 NOTE — TELEPHONE ENCOUNTER
Med Refill   dexlansoprazole (Dexilant) 60 mg DR capsule [[id number]]      [facility]     [address] [place] [address]  [id number]  [place]    Phone [phone]  Fax [phone]    Patient is all out of meds

## 2024-07-16 NOTE — PROGRESS NOTES
Electrophysiology Follow up Visit    Alysia Magdaleno is a 74 y.o. year old female patient with    1. Paroxysmal atrial fibrillation: On Propafenone 225mg q8. Has done well on this dose with no breakthroughs.     2. Typical atrial flutter: Diagnosed when patient was admitted for Propafenone initiation. She is s/p AFL ablation.      3. HTN, obesity     4. COPD    5. Rheumatoid arthritis    6. Adenocarcinoma Lung: radiation therapy     Patient seen June 2023 and doing well from an EP point of view. No further episodes of atrial fibrillation on propafenone. We will refill medications. She is being worked up for lung nodule. Her biggest complaint now is her baseline shortness of breath as well as some leg swelling that appears to be secondary to venous insufficiency. We will continue antiarrhythmics indefinitely, continue anticoagulation indefinitely. She will follow-up with Dr. Rose as well as with us once a year.     Monitor May 2024 showed average heart rate of 74 bpm with no atrial fibrillation    Patient here for follow up for atrial fibrillation and propafenone monitoring. She is feeling well with no palpitations, lightheadedness, or syncope. She is compliant with daily medications and no bleeding concerns on Eliquis.      Medical History  She has a past medical history of Adenocarcinoma, lung (Multi), Atrial fibrillation (Multi), CHF (congestive heart failure) (Multi), Chronic diastolic (congestive) heart failure (Multi), CLL (chronic lymphocytic leukemia) (Multi), COPD (chronic obstructive pulmonary disease) (Multi), Hemorrhage of anus and rectum (09/19/2016), Hemorrhage of anus and rectum (04/10/2017), Hyperlipidemia, Neuropathy, Other specified disorders of gingiva and edentulous alveolar ridge (04/10/2017), Pain in left foot (03/22/2017), Personal history of (healed) traumatic fracture, Personal history of leukemia, Personal history of Methicillin resistant Staphylococcus aureus infection, Personal history of  other diseases of the circulatory system, Personal history of other diseases of the circulatory system, Personal history of other diseases of the digestive system, Personal history of other diseases of the digestive system, Personal history of other diseases of the musculoskeletal system and connective tissue, Personal history of other diseases of the musculoskeletal system and connective tissue, Personal history of other diseases of the musculoskeletal system and connective tissue (06/17/2019), Personal history of other diseases of the nervous system and sense organs, Personal history of other diseases of the respiratory system, Personal history of other diseases of the respiratory system, Personal history of other diseases of the respiratory system, Personal history of other endocrine, nutritional and metabolic disease, Personal history of other endocrine, nutritional and metabolic disease, Personal history of other endocrine, nutritional and metabolic disease, and Unspecified injury of right ankle, initial encounter (04/10/2017).    Social History  She reports that she quit smoking about 9 years ago. Her smoking use included cigarettes. She started smoking about 49 years ago. She has a 40 pack-year smoking history. She has been exposed to tobacco smoke. She has never used smokeless tobacco. She reports that she does not currently use alcohol. She reports current drug use. Drug: Marijuana.      FamHx:   Family History   Problem Relation Name Age of Onset    Cancer Mother      Drug abuse Mother      Mental illness Mother      No Known Problems Father      Cancer Sister         Allergies   Allergen Reactions    Adhesive Tape-Silicones Itching    Aspirin Hives and Unknown    Hydrocodone-Acetaminophen Unknown     nausea    Morphine Hives    Naproxen Unknown    Niacin Unknown    Nsaids (Non-Steroidal Anti-Inflammatory Drug) Unknown and Bleeding    Opioids-Meperidine And Related Other    Tizanidine Unknown    Other Rash      gold plated metal        Outpatient Medications:  Current Outpatient Medications   Medication Instructions    albuterol (ProAir HFA) 90 mcg/actuation inhaler 2 puffs, inhalation, 4 times daily RT    apixaban (ELIQUIS) 5 mg, oral, 2 times daily    atorvastatin (LIPITOR) 20 mg, oral, Nightly    budesonide-glycopyr-formoterol (BREZTRI) 160-9-4.8 mcg/actuation HFA aerosol inhaler 2 puffs, inhalation, 2 times daily RT    cloNIDine (CATAPRES) 0.1 mg, oral, Every 8 hours    dexlansoprazole (Dexilant) 60 mg DR capsule take 1 capsule by mouth every morning TAKE BEFORE MEALS    DULoxetine (CYMBALTA) 60 mg, oral, Daily, Do not crush or chew.    famotidine (Pepcid) 40 mg tablet take 1 tablet by mouth once daily    folic acid (Folvite) 1 mg tablet 1 tablet, oral, Daily    gabapentin (NEURONTIN) 300 mg, oral, 3 times daily    lidocaine (Lidoderm) 5 % patch 1 patch, transdermal, Daily, Remove & discard patch within 12 hours or as directed by MD.    Linzess 72 mcg capsule 1 capsule, oral, Daily    methotrexate (Trexall) 2.5 mg tablet 6 tablets, oral, Once Weekly    metoprolol succinate XL (TOPROL-XL) 50 mg, oral, Daily, Do not crush or chew.    propafenone (RYTHMOL) 225 mg, oral, Every 8 hours    roflumilast (DALIRESP) 500 mcg, oral, Daily    traZODone (DESYREL) 50 mg, oral, Nightly PRN    Trelegy Ellipta 200-62.5-25 mcg blister with device 1 puff, inhalation, Daily         Last Recorded Vitals: .vital      4/26/2024     3:35 PM 4/29/2024     7:00 PM 5/3/2024     2:15 PM 5/14/2024     2:09 PM 6/6/2024     2:37 PM 6/18/2024     1:10 PM 7/17/2024     1:34 PM   Vitals   Systolic 126 104 140 116 122 143 110   Diastolic 71 70 76 78 74 79 68   Heart Rate 46 66 56 56 77 88 76   Temp 36.5 °C (97.7 °F) 36.5 °C (97.7 °F)    36.5 °C (97.7 °F)    Resp 18 18  16  20    Height (in) 1.524 m (5') 1.524 m (5') 1.524 m (5') 1.524 m (5') 1.524 m (5')  1.524 m (5')   Weight (lb) 200.62 200 200 206 202.6 202.1 202   BMI 39.18 kg/m2 39.06 kg/m2  39.06 kg/m2 40.23 kg/m2 39.57 kg/m2 39.47 kg/m2 39.45 kg/m2   BSA (m2) 1.96 m2 1.96 m2 1.96 m2 1.99 m2 1.97 m2 1.97 m2 1.97 m2   Visit Report Report  Report Report Report  Report    Visit Vitals  /68   Pulse 76   Ht 1.524 m (5')   Wt 91.6 kg (202 lb)   BMI 39.45 kg/m²   OB Status Hysterectomy   Smoking Status Former   BSA 1.97 m²        Physical Exam  Constitutional:       Appearance: Normal appearance.   Cardiovascular:      Rate and Rhythm: Normal rate and regular rhythm.      Pulses: Normal pulses.      Heart sounds: Normal heart sounds.   Pulmonary:      Effort: Pulmonary effort is normal.      Breath sounds: Normal breath sounds.   Musculoskeletal:         General: Normal range of motion.      Right lower leg: No edema.      Left lower leg: No edema.   Skin:     General: Skin is warm and dry.   Neurological:      Mental Status: She is alert and oriented to person, place, and time.   Psychiatric:         Mood and Affect: Mood normal.      Cardiac Testing Reviewed Study(s):  Echo(April/2022):   CONCLUSIONS:   1. The left ventricular systolic function is normal with a 60-65% estimated ejection fraction.   2. There is moderately reduced right ventricular systolic function.  Monitor (May/2024):   Average heart rate 74 bpm  No atrial fibrillation  <1% SVC burden  <1% PVC burden  ECGs (reviewed and my interpretation):   7/17/2024 sinus rhythm with rate of 76 bpm, DC 238ms, QRS 108ms, LAFB    Lab Results   Component Value Date    WBC 19.9 (H) 04/29/2024    HGB 13.0 04/29/2024    HCT 40.9 04/29/2024     04/29/2024    ALT 12 04/26/2024    AST 13 04/26/2024     04/29/2024    K 4.8 04/29/2024     04/29/2024    CREATININE 0.81 04/29/2024    BUN 12 04/29/2024    CO2 28 04/29/2024    TSH 5.74 (H) 12/11/2023    INR 1.1 07/13/2023       Assessment  Atrial fibrillation/flutter: Patient is maintaining sinus rhythm on propafenone. ECG today sinus rhythm with acceptable DC and QRS. Labs reviewed and  acceptable. Monitor reviewed with patient from May which showed no AF. We will continue propafenone at this time with close monitoring. Recommend continuing anticoagulation indefinitely due to elevated [id number] score. We will follow up in 6 months      Plan  Continue propafenone 225mg every 8 hours  Continue Eliquis  Continue Metoprolol  Follow up in 6 months  Continue to follow up with general cardiology      Exclusive of any other services or procedures performed, Zaira CRUZ APRN-CNP , spent 30 minutes in duration for this visit today.  This time consisted of chart review, obtaining history, and/or performing the exam as documented above as well as documenting the clinical information for the encounter in the electronic record, discussing treatment options, plans, and/or goals with patient, family, and/or caregiver, refilling medications, updating the electronic record, ordering medicines, lab work, imaging, referrals, and/or procedures as documented above and communicating with other [facility] professionals. I have discussed the results of laboratory, radiology, and cardiology studies with the patient and their family/caregiver.

## 2024-07-17 ENCOUNTER — APPOINTMENT (OUTPATIENT)
Dept: CARDIOLOGY | Facility: CLINIC | Age: 74
End: 2024-07-17
Payer: MEDICARE

## 2024-07-17 ENCOUNTER — LAB (OUTPATIENT)
Dept: LAB | Facility: LAB | Age: 74
End: 2024-07-17
Payer: MEDICARE

## 2024-07-17 VITALS
BODY MASS INDEX: 39.66 KG/M2 | WEIGHT: 202 LBS | DIASTOLIC BLOOD PRESSURE: 68 MMHG | HEART RATE: 76 BPM | SYSTOLIC BLOOD PRESSURE: 110 MMHG | HEIGHT: 60 IN

## 2024-07-17 DIAGNOSIS — Z79.899 ENCOUNTER FOR MONITORING ANTI-ARRHYTHMIC THERAPY: ICD-10-CM

## 2024-07-17 DIAGNOSIS — I48.0 PAROXYSMAL ATRIAL FIBRILLATION (MULTI): Primary | ICD-10-CM

## 2024-07-17 DIAGNOSIS — Z51.81 ENCOUNTER FOR MONITORING ANTI-ARRHYTHMIC THERAPY: ICD-10-CM

## 2024-07-17 DIAGNOSIS — C34.32 MALIGNANT NEOPLASM OF LOWER LOBE OF LEFT LUNG (MULTI): ICD-10-CM

## 2024-07-17 DIAGNOSIS — C91.10 CLL (CHRONIC LYMPHOCYTIC LEUKEMIA) (MULTI): ICD-10-CM

## 2024-07-17 LAB
ALBUMIN SERPL BCP-MCNC: 3.7 G/DL (ref 3.4–5)
ALP SERPL-CCNC: 85 U/L (ref 33–136)
ALT SERPL W P-5'-P-CCNC: 11 U/L (ref 7–45)
ANION GAP SERPL CALC-SCNC: 15 MMOL/L (ref 10–20)
AST SERPL W P-5'-P-CCNC: 18 U/L (ref 9–39)
BASOPHILS # BLD AUTO: 0.1 X10*3/UL (ref 0–0.1)
BASOPHILS NFR BLD AUTO: 0.7 %
BILIRUB SERPL-MCNC: 0.4 MG/DL (ref 0–1.2)
BUN SERPL-MCNC: 15 MG/DL (ref 6–23)
CALCIUM SERPL-MCNC: 10.4 MG/DL (ref 8.6–10.3)
CHLORIDE SERPL-SCNC: 101 MMOL/L (ref 98–107)
CO2 SERPL-SCNC: 26 MMOL/L (ref 21–32)
CREAT SERPL-MCNC: 1.01 MG/DL (ref 0.5–1.05)
EGFRCR SERPLBLD CKD-EPI 2021: 59 ML/MIN/1.73M*2
EOSINOPHIL # BLD AUTO: 0.31 X10*3/UL (ref 0–0.4)
EOSINOPHIL NFR BLD AUTO: 2.3 %
ERYTHROCYTE [DISTWIDTH] IN BLOOD BY AUTOMATED COUNT: 13.6 % (ref 11.5–14.5)
FERRITIN SERPL-MCNC: 95 NG/ML (ref 8–150)
GLUCOSE SERPL-MCNC: 127 MG/DL (ref 74–99)
HCT VFR BLD AUTO: 44.3 % (ref 36–46)
HGB BLD-MCNC: 13.9 G/DL (ref 12–16)
IMM GRANULOCYTES # BLD AUTO: 0.07 X10*3/UL (ref 0–0.5)
IMM GRANULOCYTES NFR BLD AUTO: 0.5 % (ref 0–0.9)
IRON SATN MFR SERPL: 16 % (ref 25–45)
IRON SERPL-MCNC: 47 UG/DL (ref 35–150)
LYMPHOCYTES # BLD AUTO: 8.29 X10*3/UL (ref 0.8–3)
LYMPHOCYTES NFR BLD AUTO: 60.5 %
MCH RBC QN AUTO: 31.5 PG (ref 26–34)
MCHC RBC AUTO-ENTMCNC: 31.4 G/DL (ref 32–36)
MCV RBC AUTO: 101 FL (ref 80–100)
MONOCYTES # BLD AUTO: 0.9 X10*3/UL (ref 0.05–0.8)
MONOCYTES NFR BLD AUTO: 6.6 %
NEUTROPHILS # BLD AUTO: 4.04 X10*3/UL (ref 1.6–5.5)
NEUTROPHILS NFR BLD AUTO: 29.4 %
NRBC BLD-RTO: 0 /100 WBCS (ref 0–0)
PLATELET # BLD AUTO: 292 X10*3/UL (ref 150–450)
POTASSIUM SERPL-SCNC: 4.5 MMOL/L (ref 3.5–5.3)
PROT SERPL-MCNC: 6.9 G/DL (ref 6.4–8.2)
RBC # BLD AUTO: 4.41 X10*6/UL (ref 4–5.2)
SODIUM SERPL-SCNC: 137 MMOL/L (ref 136–145)
TIBC SERPL-MCNC: 297 UG/DL (ref 240–445)
UIBC SERPL-MCNC: 250 UG/DL (ref 110–370)
VIT B12 SERPL-MCNC: 537 PG/ML (ref 211–911)
WBC # BLD AUTO: 13.7 X10*3/UL (ref 4.4–11.3)

## 2024-07-17 PROCEDURE — 1036F TOBACCO NON-USER: CPT | Performed by: NURSE PRACTITIONER

## 2024-07-17 PROCEDURE — 82728 ASSAY OF FERRITIN: CPT

## 2024-07-17 PROCEDURE — 3048F LDL-C <100 MG/DL: CPT | Performed by: NURSE PRACTITIONER

## 2024-07-17 PROCEDURE — 82607 VITAMIN B-12: CPT

## 2024-07-17 PROCEDURE — 99214 OFFICE O/P EST MOD 30 MIN: CPT | Performed by: NURSE PRACTITIONER

## 2024-07-17 PROCEDURE — 3074F SYST BP LT 130 MM HG: CPT | Performed by: NURSE PRACTITIONER

## 2024-07-17 PROCEDURE — 83550 IRON BINDING TEST: CPT

## 2024-07-17 PROCEDURE — 1159F MED LIST DOCD IN RCRD: CPT | Performed by: NURSE PRACTITIONER

## 2024-07-17 PROCEDURE — 83540 ASSAY OF IRON: CPT

## 2024-07-17 PROCEDURE — 3008F BODY MASS INDEX DOCD: CPT | Performed by: NURSE PRACTITIONER

## 2024-07-17 PROCEDURE — 3078F DIAST BP <80 MM HG: CPT | Performed by: NURSE PRACTITIONER

## 2024-07-17 PROCEDURE — 36415 COLL VENOUS BLD VENIPUNCTURE: CPT

## 2024-07-17 PROCEDURE — 80053 COMPREHEN METABOLIC PANEL: CPT

## 2024-07-17 PROCEDURE — 85025 COMPLETE CBC W/AUTO DIFF WBC: CPT

## 2024-07-17 NOTE — PATIENT INSTRUCTIONS
Continue propafenone 225mg every 8 hours  Continue Eliquis  Continue Metoprolol  Follow up in 6 months  Continue to follow up with general cardiology

## 2024-07-24 ENCOUNTER — APPOINTMENT (OUTPATIENT)
Dept: HEMATOLOGY/ONCOLOGY | Facility: CLINIC | Age: 74
End: 2024-07-24
Payer: MEDICARE

## 2024-07-30 ENCOUNTER — OFFICE VISIT (OUTPATIENT)
Dept: PAIN MEDICINE | Facility: HOSPITAL | Age: 74
End: 2024-07-30
Payer: MEDICARE

## 2024-07-30 VITALS
SYSTOLIC BLOOD PRESSURE: 145 MMHG | OXYGEN SATURATION: 92 % | BODY MASS INDEX: 38.77 KG/M2 | WEIGHT: 197.5 LBS | RESPIRATION RATE: 16 BRPM | HEART RATE: 78 BPM | HEIGHT: 60 IN | DIASTOLIC BLOOD PRESSURE: 82 MMHG

## 2024-07-30 DIAGNOSIS — M79.18 MYOFASCIAL PAIN DYSFUNCTION SYNDROME: ICD-10-CM

## 2024-07-30 DIAGNOSIS — M79.7 FIBROMYALGIA: ICD-10-CM

## 2024-07-30 DIAGNOSIS — M79.2 NEUROPATHIC PAIN: ICD-10-CM

## 2024-07-30 DIAGNOSIS — M19.90 ARTHRITIS: ICD-10-CM

## 2024-07-30 DIAGNOSIS — M25.50 POLYARTHRALGIA: ICD-10-CM

## 2024-07-30 DIAGNOSIS — M25.50 ARTHRALGIA OF MULTIPLE JOINTS: Primary | ICD-10-CM

## 2024-07-30 PROCEDURE — 1159F MED LIST DOCD IN RCRD: CPT | Performed by: CLINICAL NURSE SPECIALIST

## 2024-07-30 PROCEDURE — 99214 OFFICE O/P EST MOD 30 MIN: CPT | Performed by: CLINICAL NURSE SPECIALIST

## 2024-07-30 PROCEDURE — 1036F TOBACCO NON-USER: CPT | Performed by: CLINICAL NURSE SPECIALIST

## 2024-07-30 PROCEDURE — 1160F RVW MEDS BY RX/DR IN RCRD: CPT | Performed by: CLINICAL NURSE SPECIALIST

## 2024-07-30 PROCEDURE — 3048F LDL-C <100 MG/DL: CPT | Performed by: CLINICAL NURSE SPECIALIST

## 2024-07-30 PROCEDURE — 3077F SYST BP >= 140 MM HG: CPT | Performed by: CLINICAL NURSE SPECIALIST

## 2024-07-30 PROCEDURE — 3008F BODY MASS INDEX DOCD: CPT | Performed by: CLINICAL NURSE SPECIALIST

## 2024-07-30 PROCEDURE — 3079F DIAST BP 80-89 MM HG: CPT | Performed by: CLINICAL NURSE SPECIALIST

## 2024-07-30 RX ORDER — SULFASALAZINE 500 MG/1
500 TABLET ORAL 2 TIMES DAILY
COMMUNITY

## 2024-07-30 ASSESSMENT — ENCOUNTER SYMPTOMS
DEPRESSION: 0
LOSS OF SENSATION IN FEET: 1
OCCASIONAL FEELINGS OF UNSTEADINESS: 1

## 2024-07-30 NOTE — ASSESSMENT & PLAN NOTE
74-year-old female with past medical history significant for morbid obesity, status post bilateral TKA and MARCO, diabetes, A-fib, COPD, Stage 1 adenocarcinoma of the lung and CLL presenting for follow-up.  Presents with widespread polyarticular pain which has recently worsened secondary to patient having to interrupt her disease modifying medication prescribed by rheumatology.  She feels that her total body pain/joint pain is far worse and is limiting her activity.  She does have a follow-up appointment scheduled with rheumatology to discuss restarting medication.  Continues to feel the duloxetine and gabapentin provide relief of neuropathic symptoms.  Patient also has experienced multiple falls which she relates to tripping in a cluttered house as well as lower extremity weakness.  Offered a formal course of physical therapy to work on lower extremity strengthening which patient declined at this time.  She would like to continue home therapy exercises and utilizing her exercise bands to strengthen her lower extremities.  If she does not feel that she is making progress with home therapy exercises she will notify our office and I will provide a referral for physical therapy.  Reminded patient that the best treatment course for fibromyalgia is consistent exercise.  She does feel that her chiropractor has provided additional relief and will continue chiropractic visits.  As far as medication she continues to do well with duloxetine 60 mg daily and gabapentin 300 mg 3 times daily. Plan reviewed with patient at today's office visit.    -Recommended a formal course of physical therapy targeting lower extremity strength/weakness.  Patient will continue her home exercises at this time and if she does not feel that she is making progress will notify our office and we will provide a referral for physical therapy.  -She will continue chiropractic visits that she finds this beneficial.  -Continue duloxetine 60 mg daily.   Currently tolerating without adverse effects.  -Continue gabapentin 300 mg 3 times daily.  May increase this dose in the future if needed.  .The patient was counseled on the risks and potential side effects of gabapentin as well as its importance for dosage titration. Side effects included but were not limited to, drowsiness, sedation, cognitive decline, peripheral edema, weight gain, seizures, with abrupt withdrawal.  Patient was instructed to call the office with any concerns or side effects before abruptly stopping medication.   -Given the patient's report of fibromyalgia pain  I discussed with her in detail the recommendations most current with the [employer] in the treatment of fibromyalgia.  This includes most recent research showing that the most effective treatment for fibromyalgia is physical exercise.  Cognitive behavioral therapy has also been shown to have positive effects towards pain and other symptoms related to fibromyalgia.  Adjuvant treatments including acupuncture chiropractic and massage therapy have also been beneficial.  Specifically related to medication the FDA has approved few medications directly for the treatment of fibromyalgia and these include Cymbalta and Savella as well as older medications including Elavil.  Medications outside of the serotonin and norepinephrine realms include muscle relaxants as well as Lyrica and gabapentin.  Most importantly the College's recommendation is to avoid opiate narcotic medication for treating fibromyalgia the evidence shows at these drugs are not helpful to most people with fibromyalgia and in fact will cause greater pain sensitivity or make pain persistent.  -Patient will follow-up in our office in 6 months or sooner if needed.    Disclaimer: This note was transcribed using an audio transcription device.  As such, minor errors may be present with regard to spelling, punctuation, and inadvertent word insertion.  Please disregard  such errors.

## 2024-07-30 NOTE — PROGRESS NOTES
Subjective   Patient ID: Alysia Magdaleno is a 74 y.o. female who presents for low back pain, polyarticular pain/fibromyalgia pain      HPI    74-year-old female with history of chronic low back pain nonradiating and polyarticular pain presents for follow-up.  Patient has a history of psoriatic arthritis, rheumatoid arthritis as well as osteoarthritis causing widespread joint pain.  So experiences chronic neuropathy in bilateral feet related to history of diabetes.  Complicated medical history also positive for diagnosis of lung cancer, CLL, diabetes, atrial fibrillation, GERD and COPD.  Surgical history pertinent for bilateral TKAs and MARCO.  Imaging from 2020 consistent with degenerative disc disease cervical through lumbar spine.  Patient previously was maintained on opiates and successfully weaned off secondary to ineffectiveness and persistent constipation.  Manages her pain with disease modifying medication through rheumatology.  Patient also does well with gabapentin 300 mg 3 times daily and duloxetine 60 mg daily.  She completed formal physical therapy in the past and does home therapy exercises and stretches.  Presents at today's office visit with widespread polyarticular pain.  Patient states she hurts from head to toe.  Pain is aching and throbbing.  Patient states that pain is constant.  She cannot identify what increases her pain.  Continued improvement in neuropathy with gabapentin and duloxetine.  Multiple falls related to tripping over items in her home as well as lower extremity weakness.  Patient had a fall in April which resulted in a right rib fracture.  She states that her right rib pain has improved. Patient feels that much of her increase in pain is related to stopping her rheumatology medications secondary to missing her rheumatology appointment.  She is scheduled for a follow-up in her rheumatology office and plans to restart her disease modifying medication.  She continues to see her  chiropractor consistently.  Continues to benefit from ice.  Recent evaluation by oncology for CLL and lung cancer.  Completed lung cancer SBRT and is continued on surveillance.    Location of Pain: pt is here for interval follow up and med count. pt states she has low back pain that does not radiate except for occasional sciatic pain- reports occasional muscle spasms due to back pain.She has psoriatic arthritis, rheumatoid arthritis, and osteoarthritis. She also has neuropathy in feet.       Pain Score: 8/10     Side Effects:denies     Other pain medication/neuromodulator: Duloxetine-/Gabapentin-no refill needed    OA 6/14/23    Currently on Ozempill for weight loss  OARRS:  Pia ALEMAN Stephan, APRN-CNP, APRN-CNS on 7/30/2024  3:16 PM  I have personally reviewed the OARRS report for Alysia Magdaleno. I have considered the risks of abuse, dependence, addiction and diversion    Is the patient prescribed a combination of a benzodiazepine and opioid?  No    Last Urine Drug Screen / ordered today: No  No results found for this or any previous visit (from the past 8760 hour(s)).  N/A    Controlled Substance Agreement:  Date of the Last Agreement:       Monitoring and compliance:    ORT:    PDUQ:    Office Agreement:      Review of Systems    ROS:   General: No fevers, chills, weight loss  Skin: Negative for lesions  Eyes: No acute vision changes  Ears: No vertigo  Nose, mouth, throat: No difficulty swallowing or speaking  Respiratory: No cough, shortness of breath, cyanosis  Cardiovascular: Negative for chest pain syncope or palpitation  Gastrointestinal: No constipation, nausea, vomiting  Neurological: Negative for headache, positive for: Paresthesia and weakness  Psychological: Negative for severe or debilitating anxiety, depression. Negative memory loss  Musculoskeletal: Positive for arthralgia, myalgia and pain  Endocrine: Negative for weight gain, appetite changes, excessive sweating  Allergy/immune: Negative    All 13  systems were reviewed and are within normal levels except as noted or in the history of present illness.  Positive or pertinent negative responses are noted or were in the history of present illness. As noted, the patient denies significant or impairing weakness in the bilateral upper and lower extremities, medication induced constipation, and bowel or bladder incontinence.     Current Outpatient Medications:     albuterol (ProAir HFA) 90 mcg/actuation inhaler, Inhale 2 puffs 4 times a day., Disp: , Rfl:     atorvastatin (Lipitor) 20 mg tablet, Take 1 tablet (20 mg) by mouth once daily at bedtime., Disp: 90 tablet, Rfl: 1    budesonide-glycopyr-formoterol (BREZTRI) 160-9-4.8 mcg/actuation HFA aerosol inhaler, Inhale 2 puffs 2 times a day., Disp: , Rfl:     cloNIDine (Catapres) 0.1 mg tablet, take 1 tablet by mouth every 8 hours, Disp: 270 tablet, Rfl: 0    DULoxetine (Cymbalta) 60 mg DR capsule, Take 1 capsule (60 mg) by mouth once daily. Do not crush or chew., Disp: 30 capsule, Rfl: 2    folic acid (Folvite) 1 mg tablet, Take 1 tablet (1 mg) by mouth once daily., Disp: , Rfl:     metoprolol succinate XL (Toprol-XL) 50 mg 24 hr tablet, Take 1 tablet (50 mg) by mouth once daily. Do not crush or chew., Disp: 90 tablet, Rfl: 3    propafenone (Rythmol) 225 mg tablet, Take 1 tablet (225 mg) by mouth every 8 hours., Disp: 270 tablet, Rfl: 1    roflumilast (Daliresp) 500 mcg tablet, Take 1 tablet (500 mcg) by mouth once daily., Disp: 90 tablet, Rfl: 3    traZODone (Desyrel) 50 mg tablet, take 1 tablet by mouth at bedtime if needed for sleep, Disp: 90 tablet, Rfl: 0    Trelegy Ellipta 200-62.5-25 mcg blister with device, Inhale 1 puff early in the morning.., Disp: 28 each, Rfl: 11    apixaban (Eliquis) 5 mg tablet, Take 1 tablet (5 mg) by mouth 2 times a day., Disp: 180 tablet, Rfl: 1    dexlansoprazole (Dexilant) 60 mg DR capsule, take 1 capsule by mouth every morning TAKE BEFORE MEALS, Disp: 90 capsule, Rfl: 3     famotidine (Pepcid) 40 mg tablet, take 1 tablet by mouth once daily, Disp: 30 tablet, Rfl: 0    gabapentin (Neurontin) 300 mg capsule, Take 1 capsule (300 mg) by mouth 3 times a day., Disp: 90 capsule, Rfl: 5    methotrexate (Trexall) 2.5 mg tablet, Take 6 tablets (15 mg total) by mouth 1 (one) time per week., Disp: , Rfl:     sulfaSALAzine (Azulfidine) 500 mg tablet, Take 1 tablet (500 mg) by mouth 2 times a day., Disp: , Rfl:      Past Medical History:   Diagnosis Date    Adenocarcinoma, lung (Multi)     Atrial fibrillation (Multi)     CHF (congestive heart failure) (Multi)     Chronic diastolic (congestive) heart failure (Multi)     CLL (chronic lymphocytic leukemia) (Multi)     COPD (chronic obstructive pulmonary disease) (Multi)     Hemorrhage of anus and rectum 09/19/2016    Rectal bleeding    Hemorrhage of anus and rectum 04/10/2017    Rectal bleeding    Hyperlipidemia     Neuropathy     Other specified disorders of gingiva and edentulous alveolar ridge 04/10/2017    Pain in gums    Pain in left foot 03/22/2017    Left foot pain    Personal history of (healed) traumatic fracture     History of fracture of nasal bone    Personal history of leukemia     History of leukemia    Personal history of Methicillin resistant Staphylococcus aureus infection     History of methicillin resistant Staphylococcus aureus infection    Personal history of other diseases of the circulatory system     History of atrial fibrillation    Personal history of other diseases of the circulatory system     History of hypertension    Personal history of other diseases of the digestive system     History of irritable bowel syndrome    Personal history of other diseases of the digestive system     History of constipation    Personal history of other diseases of the musculoskeletal system and connective tissue     History of rheumatoid arthritis    Personal history of other diseases of the musculoskeletal system and connective tissue      History of chronic back pain    Personal history of other diseases of the musculoskeletal system and connective tissue 06/17/2019    History of arthritis    Personal history of other diseases of the nervous system and sense organs     History of glaucoma    Personal history of other diseases of the respiratory system     History of chronic obstructive lung disease    Personal history of other diseases of the respiratory system     H/O emphysema    Personal history of other diseases of the respiratory system     History of asthma    Personal history of other endocrine, nutritional and metabolic disease     History of hyperlipidemia    Personal history of other endocrine, nutritional and metabolic disease     History of hyperthyroidism    Personal history of other endocrine, nutritional and metabolic disease     History of type 2 diabetes mellitus    Unspecified injury of right ankle, initial encounter 04/10/2017    Right ankle injury        Past Surgical History:   Procedure Laterality Date    CT GUIDED PERCUTANEOUS BIOPSY LUNG  7/19/2023    CT GUIDED PERCUTANEOUS BIOPSY LUNG [place]19/20[place] [facility] [place]    GALLBLADDER SURGERY  01/12/2016    Gallbladder Surgery    HIP SURGERY  01/12/2016    Hip Surgery    KNEE SURGERY  01/12/2016    Knee Surgery    OTHER SURGICAL HISTORY  04/22/2019    Throat surgery    OTHER SURGICAL HISTORY  09/18/2019    Surgery    OTHER SURGICAL HISTORY  09/18/2019    Iridotomy peripheral  laser    OTHER SURGICAL HISTORY  09/18/2019    Jaw surgery    OTHER SURGICAL HISTORY  09/18/2019    Cholecystectomy    OTHER SURGICAL HISTORY  09/18/2019    Hysterectomy        Family History   Problem Relation Name Age of Onset    Cancer Mother      Drug abuse Mother      Mental illness Mother      No Known Problems Father      Cancer Sister          Allergies   Allergen Reactions    Adhesive Tape-Silicones Itching    Aspirin Hives and Unknown    Hydrocodone-Acetaminophen Unknown     nausea    Morphine Hives    Naproxen  Unknown    Niacin Unknown    Nsaids (Non-Steroidal Anti-Inflammatory Drug) Unknown and Bleeding    Opioids-Meperidine And Related Other    Tizanidine Unknown    Other Rash     gold plated metal        Objective     Visit Vitals  /82   Pulse 78   Resp 16   Ht 1.524 m (5')   Wt 89.6 kg (197 lb 8 oz)   SpO2 92%   BMI 38.57 kg/m²   OB Status Hysterectomy   Smoking Status Former   BSA 1.95 m²        Physical Exam    PE:  General: Well-developed, well-nourished, no acute distress. The patient demonstrates no pain behavior, symptom magnification or overt drug-seeking behavior.  Eye: Pupils appropriate for room lighting  Neck/thyroid: No obvious goiter or enlargement of neck noted  Respiratory exam: Normal respiratory effort, unlabored respiration. No accessory muscle use noted  Cardiac exam: Bilateral radial pulses intact  Abdominal: Nondistended  Spine, lumbar: The patient is able to rise from a seated to standing position with hesitancy.  Gait is slow and deliberate.  Ambulates with a rollator.  Tenderness to paraspinous musculature lower lumbar spine.  Multiple myofascial tender points and muscle tightness in upper and lower extremities as well as neck/trunk.  Widespread polyarticular pain most bothersome in her neck, shoulders and knees.  Neurologic exam: Muscle strength is antigravity in all 4 extremities.  Equal muscle strength bilateral upper and lower extremities 5/5.  Psychiatric exam: Judgment and insight normal, affect normal, speech is fluent, affect appropriate, demonstrating no signs of hypersomnolence, sedation, or confusion        Assessment/Plan   Problem List Items Addressed This Visit             ICD-10-CM    Arthritis M19.90    Myofascial pain dysfunction syndrome M79.18    Polyarthralgia M25.50    Fibromyalgia M79.7    Neuropathic pain M79.2    Arthralgia of multiple joints - Primary M25.50     74-year-old female with past medical history significant for morbid obesity, status post bilateral TKA  and MARCO, diabetes, A-fib, COPD, Stage 1 adenocarcinoma of the lung and CLL presenting for follow-up.  Presents with widespread polyarticular pain which has recently worsened secondary to patient having to interrupt her disease modifying medication prescribed by rheumatology.  She feels that her total body pain/joint pain is far worse and is limiting her activity.  She does have a follow-up appointment scheduled with rheumatology to discuss restarting medication.  Continues to feel the duloxetine and gabapentin provide relief of neuropathic symptoms.  Patient also has experienced multiple falls which she relates to tripping in a cluttered house as well as lower extremity weakness.  Offered a formal course of physical therapy to work on lower extremity strengthening which patient declined at this time.  She would like to continue home therapy exercises and utilizing her exercise bands to strengthen her lower extremities.  If she does not feel that she is making progress with home therapy exercises she will notify our office and I will provide a referral for physical therapy.  Reminded patient that the best treatment course for fibromyalgia is consistent exercise.  She does feel that her chiropractor has provided additional relief and will continue chiropractic visits.  As far as medication she continues to do well with duloxetine 60 mg daily and gabapentin 300 mg 3 times daily. Plan reviewed with patient at today's office visit.    -Recommended a formal course of physical therapy targeting lower extremity strength/weakness.  Patient will continue her home exercises at this time and if she does not feel that she is making progress will notify our office and we will provide a referral for physical therapy.  -She will continue chiropractic visits that she finds this beneficial.  -Continue duloxetine 60 mg daily.  Currently tolerating without adverse effects.  -Continue gabapentin 300 mg 3 times daily.  May increase this  dose in the future if needed.  .The patient was counseled on the risks and potential side effects of gabapentin as well as its importance for dosage titration. Side effects included but were not limited to, drowsiness, sedation, cognitive decline, peripheral edema, weight gain, seizures, with abrupt withdrawal.  Patient was instructed to call the office with any concerns or side effects before abruptly stopping medication.   -Given the patient's report of fibromyalgia pain  I discussed with her in detail the recommendations most current with the [employer] in the treatment of fibromyalgia.  This includes most recent research showing that the most effective treatment for fibromyalgia is physical exercise.  Cognitive behavioral therapy has also been shown to have positive effects towards pain and other symptoms related to fibromyalgia.  Adjuvant treatments including acupuncture chiropractic and massage therapy have also been beneficial.  Specifically related to medication the FDA has approved few medications directly for the treatment of fibromyalgia and these include Cymbalta and Savella as well as older medications including Elavil.  Medications outside of the serotonin and norepinephrine realms include muscle relaxants as well as Lyrica and gabapentin.  Most importantly the College's recommendation is to avoid opiate narcotic medication for treating fibromyalgia the evidence shows at these drugs are not helpful to most people with fibromyalgia and in fact will cause greater pain sensitivity or make pain persistent.  -Patient will follow-up in our office in 6 months or sooner if needed.    Disclaimer: This note was transcribed using an audio transcription device.  As such, minor errors may be present with regard to spelling, punctuation, and inadvertent word insertion.  Please disregard such errors.

## 2024-07-31 ENCOUNTER — TELEPHONE (OUTPATIENT)
Dept: PRIMARY CARE | Facility: CLINIC | Age: 74
End: 2024-07-31
Payer: MEDICARE

## 2024-07-31 DIAGNOSIS — J43.9 PULMONARY EMPHYSEMA, UNSPECIFIED EMPHYSEMA TYPE (MULTI): ICD-10-CM

## 2024-07-31 DIAGNOSIS — J41.8 MIXED SIMPLE AND MUCOPURULENT CHRONIC BRONCHITIS (MULTI): Primary | ICD-10-CM

## 2024-07-31 RX ORDER — FLUTICASONE FUROATE, UMECLIDINIUM BROMIDE AND VILANTEROL TRIFENATATE 200; 62.5; 25 UG/1; UG/1; UG/1
1 POWDER RESPIRATORY (INHALATION) DAILY
Qty: 28 EACH | Refills: 0 | Status: SHIPPED | OUTPATIENT
Start: 2024-07-31 | End: 2024-08-28

## 2024-07-31 NOTE — TELEPHONE ENCOUNTER
Needs a  refill on her Trelegy 200-62.25 -25 mcg takes 1 puff a day please send to [facility] in [place]

## 2024-07-31 NOTE — TELEPHONE ENCOUNTER
Pharmacy called and said that they do not have trelegy in stock and they do not know when they will get it in stock, they are asking if you can send in an alternative medication. If not if the office can call the patient and let her know sh will have to wait until they have t in stock or look for a pharmacy that has it in stock

## 2024-08-05 NOTE — TELEPHONE ENCOUNTER
She called back to let you know that she can't do the Breatri. Please advise if there is anything else but she would really like her Trelegy

## 2024-08-07 NOTE — PROGRESS NOTES
Chief Complaint/Reason for Visit:   Patient is coming in for 3 month cardiovascular follow up.     History Of Present Illness:    Ms. Magdaleno is coming today as a 3-month cardiovascular follow-up.  We have followed this patient previously for atrial fibrillation, bradycardia, hypertension, and diastolic heart failure.  She has been maintaining sinus rhythm with the use of propafenone and metoprolol.  Patient is anticoagulated with Eliquis.  Patient also follows with the EP service.  Her last EP appointment was July of this year and the patient was continued on her current regimen.  She was in sinus rhythm and generally doing well.    Patient comes in today generally feeling well.  She has had 1 episode of palpitations lasting about 30 minutes after forgetting to take her cardiac medications.  When she remembered she did take her medications and her symptoms improved.  She has had no other recent episodes.  She denies any significant chest pain, orthopnea, or edema.  She has some mild dyspnea on exertion which is at baseline and a chronic cough.  She has had no recent hospitalizations.    Past Medical History:  She has a past medical history of Adenocarcinoma, lung (Multi), Atrial fibrillation (Multi), CHF (congestive heart failure) (Multi), Chronic diastolic (congestive) heart failure (Multi), CLL (chronic lymphocytic leukemia) (Multi), COPD (chronic obstructive pulmonary disease) (Multi), Hemorrhage of anus and rectum (09/19/2016), Hemorrhage of anus and rectum (04/10/2017), Hyperlipidemia, Neuropathy, Other specified disorders of gingiva and edentulous alveolar ridge (04/10/2017), Pain in left foot (03/22/2017), Personal history of (healed) traumatic fracture, Personal history of leukemia, Personal history of Methicillin resistant Staphylococcus aureus infection, Personal history of other diseases of the circulatory system, Personal history of other diseases of the circulatory system, Personal history of other  diseases of the digestive system, Personal history of other diseases of the digestive system, Personal history of other diseases of the musculoskeletal system and connective tissue, Personal history of other diseases of the musculoskeletal system and connective tissue, Personal history of other diseases of the musculoskeletal system and connective tissue (06/17/2019), Personal history of other diseases of the nervous system and sense organs, Personal history of other diseases of the respiratory system, Personal history of other diseases of the respiratory system, Personal history of other diseases of the respiratory system, Personal history of other endocrine, nutritional and metabolic disease, Personal history of other endocrine, nutritional and metabolic disease, Personal history of other endocrine, nutritional and metabolic disease, and Unspecified injury of right ankle, initial encounter (04/10/2017).    Past Surgical History:  She has a past surgical history that includes Hip surgery (01/12/2016); Gallbladder surgery (01/12/2016); Knee surgery (01/12/2016); Other surgical history (04/22/2019); Other surgical history (09/18/2019); Other surgical history (09/18/2019); Other surgical history (09/18/2019); Other surgical history (09/18/2019); Other surgical history (09/18/2019); and CT guided percutaneous biopsy lung (7/19/2023).      Social History:  She reports that she quit smoking about 9 years ago. Her smoking use included cigarettes. She started smoking about 49 years ago. She has a 40 pack-year smoking history. She has been exposed to tobacco smoke. She has never used smokeless tobacco. She reports that she does not currently use alcohol. She reports current drug use. Drug: Marijuana.    Family History:  Family History   Problem Relation Name Age of Onset    Cancer Mother      Drug abuse Mother      Mental illness Mother      No Known Problems Father      Cancer Sister          Allergies:  Adhesive  tape-silicones, Aspirin, Hydrocodone-acetaminophen, Morphine, Naproxen, Niacin, Nsaids (non-steroidal anti-inflammatory drug), Opioids-meperidine and related, Tizanidine, and Other    Medications:  Current Outpatient Medications   Medication Instructions    albuterol (ProAir HFA) 90 mcg/actuation inhaler 2 puffs, inhalation, 4 times daily RT    apixaban (ELIQUIS) 5 mg, oral, 2 times daily    atorvastatin (LIPITOR) 20 mg, oral, Nightly    budesonide-glycopyr-formoterol (BREZTRI) 160-9-4.8 mcg/actuation HFA aerosol inhaler 2 puffs, inhalation, 2 times daily RT    cloNIDine (CATAPRES) 0.1 mg, oral, Every 8 hours    dexlansoprazole (Dexilant) 60 mg DR capsule take 1 capsule by mouth every morning TAKE BEFORE MEALS    DULoxetine (CYMBALTA) 60 mg, oral, Daily, Do not crush or chew.    famotidine (Pepcid) 40 mg tablet take 1 tablet by mouth once daily    gabapentin (NEURONTIN) 300 mg, oral, 3 times daily    loratadine 10 mg capsule oral    metoprolol succinate XL (TOPROL-XL) 50 mg, oral, Daily, Do not crush or chew.    propafenone (RYTHMOL) 225 mg, oral, Every 8 hours    roflumilast (DALIRESP) 500 mcg, oral, Daily    traZODone (DESYREL) 50 mg, oral, Nightly PRN    Trelegy Ellipta 200-62.5-25 mcg blister with device 1 puff, inhalation, Daily       Review of Systems:  Review of Systems   Constitutional: Negative. Negative for decreased appetite and malaise/fatigue.   HENT: Negative.     Eyes:  Negative for blurred vision and visual disturbance.   Cardiovascular:  Positive for dyspnea on exertion and palpitations (Had episode of palpitations lasting ~30 min after forgeting to take heart meds, resolved when she took meds.). Negative for chest pain, irregular heartbeat, leg swelling, orthopnea and syncope.   Respiratory:  Positive for cough. Negative for shortness of breath.    Musculoskeletal:  Negative for arthritis and falls.   Gastrointestinal: Negative.    Neurological:  Negative for focal weakness and light-headedness  (Occasional lightheadedness).   Psychiatric/Behavioral:  Negative for depression and memory loss.         Vitals  Visit Vitals  /80 (BP Location: Right arm, Patient Position: Sitting, BP Cuff Size: Adult)   Pulse 64   Ht 1.524 m (5')   Wt 89.4 kg (197 lb)   SpO2 94%   BMI 38.47 kg/m²   OB Status Hysterectomy   Smoking Status Former   BSA 1.95 m²        Physical Exam:  Physical Exam  Constitutional:       Appearance: Normal appearance.   HENT:      Head: Normocephalic.   Eyes:      Conjunctiva/sclera: Conjunctivae normal.   Cardiovascular:      Rate and Rhythm: Regular rhythm. Bradycardia present.      Pulses: Normal pulses.      Heart sounds: S1 normal and S2 normal. No murmur heard.     No friction rub. No gallop.   Pulmonary:      Effort: Pulmonary effort is normal.      Breath sounds: Normal breath sounds.   Abdominal:      General: Bowel sounds are normal.      Palpations: Abdomen is soft.      Tenderness: There is no abdominal tenderness.   Musculoskeletal:      Cervical back: Neck supple.      Right lower leg: No edema.      Left lower leg: No edema.      Comments: Using wheeled walker for ambulation.   Skin:     General: Skin is warm and dry.   Neurological:      General: No focal deficit present.      Mental Status: She is alert and oriented to person, place, and time.   Psychiatric:         Attention and Perception: Attention normal.         Mood and Affect: Mood normal.           Last Labs:  CBC -  Lab Results   Component Value Date    WBC 13.7 (H) 07/17/2024    HGB 13.9 07/17/2024    HCT 44.3 07/17/2024     (H) 07/17/2024     07/17/2024     Lab Results   Component Value Date    GLUCOSE 127 (H) 07/17/2024    CALCIUM 10.4 (H) 07/17/2024     07/17/2024    K 4.5 07/17/2024    CO2 26 07/17/2024     07/17/2024    BUN 15 07/17/2024    CREATININE 1.01 07/17/2024      CMP -  Lab Results   Component Value Date    CALCIUM 10.4 (H) 07/17/2024    PROT 6.9 07/17/2024    ALBUMIN 3.7  07/17/2024    AST 18 07/17/2024    ALT 11 07/17/2024    ALKPHOS 85 07/17/2024    BILITOT 0.4 07/17/2024       LIPID PANEL -   Lab Results   Component Value Date    CHOL 159 06/11/2024    TRIG 118 06/11/2024    HDL 71.2 06/11/2024    CHHDL 2.2 06/11/2024    LDLF 57 06/19/2023    VLDL 24 06/11/2024    NHDL 88 06/11/2024       Lab Results   Component Value Date    BNP 66 06/19/2023    HGBA1C 7.1 (H) 12/11/2023    HGBA1C 7.4 (A) 06/27/2023       Last Cardiology Tests:    Echo:4-7-22  CONCLUSIONS:   1. The left ventricular systolic function is normal with a 60-65% estimated ejection fraction.   2. There is moderately reduced right ventricular systolic function.     Stress Test:10-12-22  Summary:   1. No clinical or electrocardiographic evidence for ischemia at maximal infusion.   2. No ECG changes from baseline.   3. Nuclear image results are reported separately.     IMPRESSION:  1. There is a small fixed perfusion defect in the apical wall. No  reversible perfusion defects seen. There is a low probability of  ischemia.  2. Calculated ejection fraction is 63% without segmental wall motion  abnormality seen.    Lab review: I have personally reviewed the laboratory result(s)     Assessment/Plan:  Atrial fibrillation/flutter: Patient has been maintaining sinus rhythm with the use of propafenone.  She also is on a beta-blocker.  Patient has an elevated UCA3NL3-RTCh score requiring anticoagulation.  She currently is on Eliquis 5 mg twice daily.  Patient is not having any abnormal bleeding or bruising and should continue anticoagulation.  She follows with the EP service as well.    Chronic diastolic heart failure: Patient appears to be well compensated on exam.  She has no edema, no JVD, lungs are clear.  She does not require diuretics at this time.    Dyslipidemia: Patient currently is on atorvastatin 20 mg nightly.  Lipid labs done in June show good control.  Patient will continue to follow a heart healthy diet and stay on  her current dose of atorvastatin.    Abnormal coronary artery calcification: Patient was previously noted to have abnormal coronary artery calcification noted on CT scan.  Stress test done October 12, 2022 showed low probability of ischemia.  Patient is currently angina class 0.  She will continue on atorvastatin and metoprolol.  She is not on aspirin due to the need for anticoagulation.    CLL/lung adenocarcinoma: Patient is currently being followed by hematology/oncology.    Patient will follow-up with Dr. Rose in 6 months.  Patient instructed to call with any cardiovascular complaints. All questions were answered.       Dragon dictation was utilized to create this document. Quite often unanticipated grammatical, syntax,  and other interpretive errors are inadvertently transcribed by the computer software.  Please disregard these errors.  Please excuse any errors that have escaped final proofreading.          Tika Curran, APRN-CNP

## 2024-08-08 ENCOUNTER — APPOINTMENT (OUTPATIENT)
Dept: CARDIOLOGY | Facility: CLINIC | Age: 74
End: 2024-08-08
Payer: MEDICARE

## 2024-08-12 ENCOUNTER — APPOINTMENT (OUTPATIENT)
Dept: CARDIOLOGY | Facility: HOSPITAL | Age: 74
End: 2024-08-12
Payer: MEDICARE

## 2024-08-12 VITALS
HEART RATE: 64 BPM | WEIGHT: 197 LBS | SYSTOLIC BLOOD PRESSURE: 126 MMHG | OXYGEN SATURATION: 94 % | HEIGHT: 60 IN | DIASTOLIC BLOOD PRESSURE: 80 MMHG | BODY MASS INDEX: 38.68 KG/M2

## 2024-08-12 DIAGNOSIS — R00.1 BRADYCARDIA: ICD-10-CM

## 2024-08-12 DIAGNOSIS — I48.19 PERSISTENT ATRIAL FIBRILLATION (MULTI): ICD-10-CM

## 2024-08-12 DIAGNOSIS — C34.90 PRIMARY ADENOCARCINOMA OF LUNG, UNSPECIFIED LATERALITY (MULTI): ICD-10-CM

## 2024-08-12 DIAGNOSIS — I25.10 CORONARY ARTERY CALCIFICATION: ICD-10-CM

## 2024-08-12 DIAGNOSIS — I10 BENIGN ESSENTIAL HYPERTENSION: ICD-10-CM

## 2024-08-12 DIAGNOSIS — E78.5 HYPERLIPIDEMIA, UNSPECIFIED HYPERLIPIDEMIA TYPE: ICD-10-CM

## 2024-08-12 DIAGNOSIS — I25.84 CORONARY ARTERY CALCIFICATION: ICD-10-CM

## 2024-08-12 DIAGNOSIS — E78.5 DYSLIPIDEMIA: ICD-10-CM

## 2024-08-12 DIAGNOSIS — C91.10 CLL (CHRONIC LYMPHOCYTIC LEUKEMIA) (MULTI): ICD-10-CM

## 2024-08-12 DIAGNOSIS — I50.32 CHRONIC DIASTOLIC HEART FAILURE (MULTI): ICD-10-CM

## 2024-08-12 DIAGNOSIS — I48.91 ATRIAL FIBRILLATION BY ELECTROCARDIOGRAM (MULTI): Primary | ICD-10-CM

## 2024-08-12 PROCEDURE — 3079F DIAST BP 80-89 MM HG: CPT | Performed by: NURSE PRACTITIONER

## 2024-08-12 PROCEDURE — 3074F SYST BP LT 130 MM HG: CPT | Performed by: NURSE PRACTITIONER

## 2024-08-12 PROCEDURE — 99213 OFFICE O/P EST LOW 20 MIN: CPT | Performed by: NURSE PRACTITIONER

## 2024-08-12 PROCEDURE — 3048F LDL-C <100 MG/DL: CPT | Performed by: NURSE PRACTITIONER

## 2024-08-12 PROCEDURE — 1160F RVW MEDS BY RX/DR IN RCRD: CPT | Performed by: NURSE PRACTITIONER

## 2024-08-12 PROCEDURE — 1036F TOBACCO NON-USER: CPT | Performed by: NURSE PRACTITIONER

## 2024-08-12 PROCEDURE — 1159F MED LIST DOCD IN RCRD: CPT | Performed by: NURSE PRACTITIONER

## 2024-08-12 PROCEDURE — 3008F BODY MASS INDEX DOCD: CPT | Performed by: NURSE PRACTITIONER

## 2024-08-12 ASSESSMENT — ENCOUNTER SYMPTOMS
MEMORY LOSS: 0
SHORTNESS OF BREATH: 0
ORTHOPNEA: 0
PALPITATIONS: 1
COUGH: 1
DEPRESSION: 0
LIGHT-HEADEDNESS: 0
GASTROINTESTINAL NEGATIVE: 1
BLURRED VISION: 0
DECREASED APPETITE: 0
SYNCOPE: 0
IRREGULAR HEARTBEAT: 0
FOCAL WEAKNESS: 0
FALLS: 0
DYSPNEA ON EXERTION: 1
CONSTITUTIONAL NEGATIVE: 1

## 2024-08-12 NOTE — PATIENT INSTRUCTIONS
Continue on current meds  Heart healthy, low sodium diet  Mediterranean diet is recommended  Follow up with Dr Rose in 6 months

## 2024-08-13 NOTE — TELEPHONE ENCOUNTER
Patient is using Trelegy from [facility] will transfer to [facility] or [facility]

## 2024-08-19 DIAGNOSIS — M79.7 FIBROMYALGIA: ICD-10-CM

## 2024-08-19 RX ORDER — DULOXETIN HYDROCHLORIDE 60 MG/1
60 CAPSULE, DELAYED RELEASE ORAL DAILY
Qty: 30 CAPSULE | Refills: 0 | Status: SHIPPED | OUTPATIENT
Start: 2024-08-19

## 2024-08-19 NOTE — TELEPHONE ENCOUNTER
Med Refill   DULoxetine (Cymbalta) 60 mg DR capsule [[id number]]     [facility] - [address] [address]  Phone: [phone]  Fax: [phone]  JUAN M #: --     LOV: 6/6/24     NOV: 12/5/24

## 2024-08-21 ENCOUNTER — APPOINTMENT (OUTPATIENT)
Dept: HEMATOLOGY/ONCOLOGY | Facility: CLINIC | Age: 74
End: 2024-08-21
Payer: MEDICARE

## 2024-08-21 DIAGNOSIS — D50.9 IRON DEFICIENCY ANEMIA, UNSPECIFIED IRON DEFICIENCY ANEMIA TYPE: Primary | ICD-10-CM

## 2024-08-21 DIAGNOSIS — K90.9 IDIOPATHIC STEATORRHEA (HHS-HCC): ICD-10-CM

## 2024-08-24 DIAGNOSIS — I48.91 ATRIAL FIBRILLATION BY ELECTROCARDIOGRAM (MULTI): ICD-10-CM

## 2024-08-28 DIAGNOSIS — I48.91 ATRIAL FIBRILLATION BY ELECTROCARDIOGRAM (MULTI): ICD-10-CM

## 2024-09-05 ENCOUNTER — APPOINTMENT (OUTPATIENT)
Dept: RHEUMATOLOGY | Facility: CLINIC | Age: 74
End: 2024-09-05
Payer: MEDICARE

## 2024-09-05 VITALS
DIASTOLIC BLOOD PRESSURE: 62 MMHG | OXYGEN SATURATION: 92 % | WEIGHT: 195.4 LBS | BODY MASS INDEX: 38.16 KG/M2 | SYSTOLIC BLOOD PRESSURE: 110 MMHG | HEART RATE: 60 BPM

## 2024-09-05 DIAGNOSIS — M05.79 RHEUMATOID ARTHRITIS INVOLVING MULTIPLE SITES WITH POSITIVE RHEUMATOID FACTOR (MULTI): Primary | ICD-10-CM

## 2024-09-05 PROCEDURE — 1125F AMNT PAIN NOTED PAIN PRSNT: CPT | Performed by: INTERNAL MEDICINE

## 2024-09-05 PROCEDURE — 1036F TOBACCO NON-USER: CPT | Performed by: INTERNAL MEDICINE

## 2024-09-05 PROCEDURE — 99213 OFFICE O/P EST LOW 20 MIN: CPT | Performed by: INTERNAL MEDICINE

## 2024-09-05 PROCEDURE — 3074F SYST BP LT 130 MM HG: CPT | Performed by: INTERNAL MEDICINE

## 2024-09-05 PROCEDURE — 3078F DIAST BP <80 MM HG: CPT | Performed by: INTERNAL MEDICINE

## 2024-09-05 PROCEDURE — 3048F LDL-C <100 MG/DL: CPT | Performed by: INTERNAL MEDICINE

## 2024-09-05 RX ORDER — METHOTREXATE 2.5 MG/1
15 TABLET ORAL WEEKLY
Qty: 72 TABLET | Refills: 0 | Status: SHIPPED | OUTPATIENT
Start: 2024-09-05 | End: 2024-12-04

## 2024-09-05 RX ORDER — FOLIC ACID 1 MG/1
1 TABLET ORAL DAILY
Qty: 90 TABLET | Refills: 0 | Status: SHIPPED | OUTPATIENT
Start: 2024-09-05 | End: 2024-12-04

## 2024-09-05 RX ORDER — METHOTREXATE 2.5 MG/1
15 TABLET ORAL WEEKLY
COMMUNITY
End: 2024-09-05 | Stop reason: SDUPTHER

## 2024-09-05 RX ORDER — SULFASALAZINE 500 MG/1
500 TABLET ORAL 2 TIMES DAILY
COMMUNITY
End: 2024-09-05 | Stop reason: SDUPTHER

## 2024-09-05 RX ORDER — FOLIC ACID 1 MG/1
1 TABLET ORAL DAILY
COMMUNITY
End: 2024-09-05 | Stop reason: SDUPTHER

## 2024-09-05 RX ORDER — SULFASALAZINE 500 MG/1
500 TABLET ORAL 2 TIMES DAILY
Qty: 180 TABLET | Refills: 0 | Status: SHIPPED | OUTPATIENT
Start: 2024-09-05 | End: 2024-12-04

## 2024-09-05 ASSESSMENT — PAIN SCALES - GENERAL: PAINLEVEL: 6

## 2024-09-05 NOTE — PATIENT INSTRUCTIONS
Take methotrexate 6 pills/week.  Take sulfasalazine 1 pill twice a day.  Call me if any question.  Follow-up in 3 months.

## 2024-09-05 NOTE — PROGRESS NOTES
Subjective  . Alysia Magdaleno is a 74 y.o. female who presents for Follow-up.    HPI. 74-year-old female with history of seropositive RA, psoriasis, chronic back pain, OA s/p bilateral TKR, s/p bilateral THR, CLL, COPD, PAF, GERD, diabetes, mandibular osteomyelitis s/p graft surgery presented for follow-up.     Last visit in October 2023.    She has chronic pain that gets worse with certain activities like getting up, walking and bending over.  Recently she noted soreness in the ankles.  She has not noticed swelling of the ankles.  She ran out of methotrexate and sulfasalazine about 4 weeks ago.    Immunosuppression: Methotrexate and sulfasalazine(9/2022).     Past immunosuppression: Prednisone.  Tapered to discontinued in July 2022.    Review of Systems   All other systems reviewed and are negative.    Objective     Blood pressure 110/62, pulse 60, weight 88.6 kg (195 lb 6.4 oz), SpO2 92%.    Physical Exam.  Gen. AAO x3, NAD.  HEENT: No pallor or icterus, PERRLA, EOMI. No cervical lymphadenopathy .  Skin: Small psoriatic rash at the dorsum of right hand.  Heart: S1, S2/ RRR.   Lungs: CTA B.  Abdomen: Soft, NT/ND, BS regular.  MSK: No.swelling or tenderness of the MCP, PIP, DIP, wrist, elbow and MTP joints.  Bilateral ankle with mild tenderness.  No erythema or effusion of the ankles.    Neuro: Sensation to touch intact.Strength 5/5 throughout.   Psych:Appropriate mood and behavior  EXT: No edema    Assessment/Plan  . 74-year-old female with history of seropositive RA, psoriasis, chronic back pain, OA s/p bilateral TKR, s/p bilateral THR, CLL, COPD, PAF, GERD, diabetes, mandibular osteomyelitis s/p graft surgery presented for follow-up.     #1: Seropositive RA, possible PsA.   -Resume methotrexate 15 mg/week.  -Resume sulfasalazine 1 pill twice a day.  -Resume folic acid 1 mg daily.  -Labs reviewed.    Follow-up in 3 months.     This note was partially generated using the Dragon Voice recognition system. There may be  some incorrect wording, spelling and/or spelling errors or punctuation errors that were not corrected prior to committing the note to the medical record.          Problem List Items Addressed This Visit       Rheumatoid arthritis with rheumatoid factor (Multi) - Primary    Relevant Medications    methotrexate (Trexall) 2.5 mg tablet    folic acid (Folvite) 1 mg tablet    sulfaSALAzine (Azulfidine) 500 mg tablet            Active Ambulatory Problems     Diagnosis Date Noted    Abdominal pain 04/19/2023    Low back pain, unspecified 07/13/2022    Abnormal computed tomography scan 04/19/2023    Acquired spondylolisthesis 04/19/2023    Allergic dermatitis 04/19/2023    Arthritis 04/19/2023    Pharyngitis 04/19/2023    Atrial fibrillation by electrocardiogram (Multi) 04/19/2023    Benign essential hypertension 04/19/2023    Breathing-related sleep disorder 04/19/2023    Strain of neck muscle 04/19/2023    Changing skin lesion 04/19/2023    CHF (congestive heart failure) (Multi) 04/19/2023    Chronic diastolic heart failure (Multi) 04/19/2023    Chronic idiopathic constipation 04/19/2023    Chronic pain 04/19/2023    Chronic pain syndrome 08/01/2022    Chronic lymphocytic leukemia (Multi) 07/14/2022    Polyp of colon 08/01/2022    Acute angle-closure glaucoma 04/19/2023    Acute conjunctivitis 04/19/2023    Calcification of coronary artery 08/01/2022    Dental infection 04/19/2023    Depressive disorder 04/19/2023    Diabetic neuropathy (Multi) 04/19/2023    Dysphagia 04/19/2023    Chronic obstructive pulmonary disease (Multi) 08/01/2022    Emphysema/COPD (Multi) 04/19/2023    Exocrine pancreatic insufficiency ([facility]) 04/19/2023    Gastroesophageal reflux disease without esophagitis 08/01/2022    Hemorrhoids 04/19/2023    History of colonic polyps 07/14/2022    Hyperglycemia 04/19/2023    Hyperlipidemia 04/19/2023    Insomnia 04/19/2023    Irritable bowel syndrome with diarrhea 04/19/2023    Irritable bowel syndrome  04/19/2023    Pain of left lower extremity 04/19/2023    Lesion of finger 04/19/2023    Infiltrate of lung present on imaging study 04/19/2023    Solitary pulmonary nodule 02/03/2023    Left ventricular dysfunction 07/15/2022    Severe obesity (Multi) 11/14/2022    Multilevel degenerative disc disease 04/19/2023    Myofascial pain dysfunction syndrome 04/19/2023    Osteoarthritis of both knees 04/19/2023    Osteoarthritis of right hip 04/19/2023    Otalgia of both ears 04/19/2023    Paroxysmal atrial fibrillation (Multi) 08/01/2022    Pain management 04/19/2023    Pain of both sacroiliac joints 04/19/2023    Pain of left upper extremity 04/19/2023    Arthralgia of hip 04/19/2023    Polyarthralgia 04/19/2023    Polyarticular osteoarthritis 04/19/2023    Pruritus 04/19/2023    Reactive airway disease ([facility]) 04/19/2023    Restless legs syndrome 04/19/2023    Rheumatoid arthritis with rheumatoid factor (Multi) 04/19/2023    Rib pain 04/19/2023    Right upper quadrant abdominal pain 04/19/2023    Current smoker 04/19/2023    Shortness of breath 04/19/2023    Typical atrial flutter (Multi) 04/19/2023    Hypothyroidism 08/01/2022    Edema of both lower extremities 06/27/2023    Abnormal foot pulse 06/27/2023    Adenocarcinoma of lung (Multi) 09/07/2023    Anatomical narrow angle borderline glaucoma of both eyes 06/18/2019    Epistaxis 03/31/2021    Chronic rhinitis 03/31/2021    Hypertension 09/07/2023    Lymphedema 09/07/2023    Morbid obesity (Multi) 08/13/2021    Dependence on supplemental oxygen 08/13/2021    Perforation of nasal septum 03/31/2021    Psoriasis with arthropathy (Multi) 09/07/2023    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    Fibromyalgia 12/18/2023    Abnormal foot color 05/01/2024    Cellulitis 05/01/2024    Compression fracture of lumbar vertebra (Multi) 05/01/2024    Contact dermatitis 04/19/2023    Contusion of rib 05/01/2024    Cough 05/01/2024    Edema of foot 06/19/2023    History of atrial  fibrillation 05/01/2024    History of elevated lipids 05/01/2024    History of glaucoma 05/01/2024    History of hypertension 05/01/2024    History of hyperthyroidism 05/01/2024    History of leukemia 05/01/2024    History of methicillin resistant Staphylococcus aureus infection 05/01/2024    History of type 2 diabetes mellitus 05/01/2024    Iron deficiency anemia 06/19/2023    Neuropathic pain 05/01/2024    Pneumonia due to infectious organism 05/01/2024    Seropositive rheumatoid arthritis (Multi) 08/01/2022    Type 2 diabetes mellitus (Multi) 08/01/2022    Arthralgia of multiple joints 04/19/2023    Cystitis 05/01/2024    Bradycardia 05/03/2024    Current moderate episode of major depressive disorder without prior episode (Multi) 05/14/2024    Injury of right ankle 06/22/2024    Multiple pulmonary nodules 06/22/2024    Hemorrhage of anus and rectum 06/22/2024    Neuropathy 06/22/2024    Other specified disorders of gingiva and edentulous alveolar ridge 06/22/2024    Personal history of (healed) traumatic fracture 06/22/2024    Personal history of other diseases of the digestive system 06/22/2024    Personal history of other diseases of the musculoskeletal system and connective tissue 06/22/2024    Personal history of other diseases of the respiratory system 06/22/2024    Encounter for monitoring anti-arrhythmic therapy 07/17/2024     Resolved Ambulatory Problems     Diagnosis Date Noted    SCC (squamous cell carcinoma) 09/07/2023     Past Medical History:   Diagnosis Date    Adenocarcinoma, lung (Multi)     Atrial fibrillation (Multi)     Chronic diastolic (congestive) heart failure (Multi)     CLL (chronic lymphocytic leukemia) (Multi)     COPD (chronic obstructive pulmonary disease) (Multi)     Pain in left foot 03/22/2017    Personal history of leukemia     Personal history of Methicillin resistant Staphylococcus aureus infection     Personal history of other diseases of the circulatory system     Personal  history of other diseases of the circulatory system     Personal history of other diseases of the nervous system and sense organs     Personal history of other endocrine, nutritional and metabolic disease     Personal history of other endocrine, nutritional and metabolic disease     Personal history of other endocrine, nutritional and metabolic disease     Unspecified injury of right ankle, initial encounter 04/10/2017       Family History   Problem Relation Name Age of Onset    Cancer Mother      Drug abuse Mother      Mental illness Mother      No Known Problems Father      Cancer Sister         Past Surgical History:   Procedure Laterality Date    CT GUIDED PERCUTANEOUS BIOPSY LUNG  2023    CT GUIDED PERCUTANEOUS BIOPSY LUNG 2023 [facility]    GALLBLADDER SURGERY  2016    Gallbladder Surgery    HIP SURGERY  2016    Hip Surgery    KNEE SURGERY  2016    Knee Surgery    OTHER SURGICAL HISTORY  2019    Throat surgery    OTHER SURGICAL HISTORY  2019    Surgery    OTHER SURGICAL HISTORY  2019    Iridotomy peripheral  laser    OTHER SURGICAL HISTORY  2019    Jaw surgery    OTHER SURGICAL HISTORY  2019    Cholecystectomy    OTHER SURGICAL HISTORY  2019    Hysterectomy       Social History     Tobacco Use   Smoking Status Former    Current packs/day: 0.00    Average packs/day: 1 pack/day for 40.0 years (40.0 ttl pk-yrs)    Types: Cigarettes    Start date: 1975    Quit date: 2015    Years since quittin.2    Passive exposure: Past   Smokeless Tobacco Never       Allergies  Adhesive tape-silicones, Aspirin, Hydrocodone-acetaminophen, Morphine, Naproxen, Niacin, Nsaids (non-steroidal anti-inflammatory drug), Opioids-meperidine and related, Tizanidine, and Other    Current Meds  Current Outpatient Medications   Medication Instructions    albuterol (ProAir HFA) 90 mcg/actuation inhaler 2 puffs, inhalation, 4 times daily RT    apixaban (ELIQUIS) 5 mg,  oral, 2 times daily    atorvastatin (LIPITOR) 20 mg, oral, Nightly    budesonide-glycopyr-formoterol (BREZTRI) 160-9-4.8 mcg/actuation HFA aerosol inhaler 2 puffs, inhalation, 2 times daily RT    cloNIDine (CATAPRES) 0.1 mg, oral, Every 8 hours    dexlansoprazole (Dexilant) 60 mg DR capsule take 1 capsule by mouth every morning TAKE BEFORE MEALS    DULoxetine (CYMBALTA) 60 mg, oral, Daily, Do not crush or chew.    famotidine (Pepcid) 40 mg tablet take 1 tablet by mouth once daily    folic acid (FOLVITE) 1 mg, oral, Daily    gabapentin (NEURONTIN) 300 mg, oral, 3 times daily    loratadine 10 mg capsule oral    methotrexate (TREXALL) 15 mg, oral, Weekly, Follow directions carefully, and ask to explain any part you do not understand. Take exactly as directed.    metoprolol succinate XL (TOPROL-XL) 50 mg, oral, Daily, Do not crush or chew.    propafenone (RYTHMOL) 225 mg, oral, Every 8 hours    roflumilast (DALIRESP) 500 mcg, oral, Daily    sulfaSALAzine (AZULFIDINE) 500 mg, oral, 2 times daily    traZODone (DESYREL) 50 mg, oral, Nightly PRN    Trelegy Ellipta 200-62.5-25 mcg blister with device 1 puff, inhalation, Daily        Lab Results   Component Value Date    RF 94 (H) 08/08/2019    SEDRATE 15 07/13/2023    CRP 1.12 (A) 07/13/2023    URICACID 6.0 07/13/2023             Ted Rivero MD

## 2024-09-09 ENCOUNTER — TELEPHONE (OUTPATIENT)
Dept: PRIMARY CARE | Facility: CLINIC | Age: 74
End: 2024-09-09
Payer: MEDICARE

## 2024-09-09 DIAGNOSIS — M79.7 FIBROMYALGIA: ICD-10-CM

## 2024-09-09 DIAGNOSIS — M79.2 NEUROPATHIC PAIN: ICD-10-CM

## 2024-09-09 DIAGNOSIS — J43.9 PULMONARY EMPHYSEMA, UNSPECIFIED EMPHYSEMA TYPE (MULTI): ICD-10-CM

## 2024-09-09 RX ORDER — FLUTICASONE FUROATE, UMECLIDINIUM BROMIDE AND VILANTEROL TRIFENATATE 200; 62.5; 25 UG/1; UG/1; UG/1
1 POWDER RESPIRATORY (INHALATION) DAILY
Qty: 28 EACH | Refills: 0 | Status: SHIPPED | OUTPATIENT
Start: 2024-09-09 | End: 2024-10-07

## 2024-09-09 RX ORDER — DULOXETIN HYDROCHLORIDE 60 MG/1
60 CAPSULE, DELAYED RELEASE ORAL DAILY
Qty: 30 CAPSULE | Refills: 11 | Status: SHIPPED | OUTPATIENT
Start: 2024-09-09

## 2024-09-09 NOTE — TELEPHONE ENCOUNTER
Med Refill   Tremina Ellipta 200-62.5-25 mcg blister with device [[id number]]       [facility] [place] [address], [place] [id number] [phone]        Patient wants to switch all active meds to this pharmacy please

## 2024-09-09 NOTE — TELEPHONE ENCOUNTER
Pt is requesting refill of   gabapentin 300 mg po [facility]                                                         LV:   7/30/24                   NV:  1/28/25                                        Pended RX to LEONARDO Trinh for transmission to pharmacy.

## 2024-09-09 NOTE — TELEPHONE ENCOUNTER
Medication: Duloxetine     Dosage: 60 mg DR capsule     Sig: Take 1 capsule (60 mg) by mouth once daily. Do not crush or chew.     Dispense Quantity:    Refills:     Pharmacy: [facility]     LOV: 6/6/24    NOV: 12/5/24

## 2024-09-10 RX ORDER — GABAPENTIN 300 MG/1
300 CAPSULE ORAL 3 TIMES DAILY
Qty: 90 CAPSULE | Refills: 5 | Status: SHIPPED | OUTPATIENT
Start: 2024-09-10

## 2024-09-18 ENCOUNTER — HOSPITAL ENCOUNTER (OUTPATIENT)
Dept: RADIOLOGY | Facility: HOSPITAL | Age: 74
Discharge: HOME | End: 2024-09-18
Payer: MEDICARE

## 2024-09-18 ENCOUNTER — LAB (OUTPATIENT)
Dept: LAB | Facility: LAB | Age: 74
End: 2024-09-18
Payer: MEDICARE

## 2024-09-18 ENCOUNTER — APPOINTMENT (OUTPATIENT)
Dept: RADIOLOGY | Facility: HOSPITAL | Age: 74
End: 2024-09-18
Payer: MEDICARE

## 2024-09-18 DIAGNOSIS — C34.32 MALIGNANT NEOPLASM OF LOWER LOBE OF LEFT LUNG (MULTI): ICD-10-CM

## 2024-09-18 DIAGNOSIS — D50.9 IRON DEFICIENCY ANEMIA, UNSPECIFIED IRON DEFICIENCY ANEMIA TYPE: ICD-10-CM

## 2024-09-18 DIAGNOSIS — K90.9 IDIOPATHIC STEATORRHEA (HHS-HCC): ICD-10-CM

## 2024-09-18 LAB
ALBUMIN SERPL BCP-MCNC: 3.7 G/DL (ref 3.4–5)
ALP SERPL-CCNC: 65 U/L (ref 33–136)
ALT SERPL W P-5'-P-CCNC: 9 U/L (ref 7–45)
ANION GAP SERPL CALC-SCNC: 11 MMOL/L (ref 10–20)
AST SERPL W P-5'-P-CCNC: 13 U/L (ref 9–39)
BASOPHILS # BLD AUTO: 0.06 X10*3/UL (ref 0–0.1)
BASOPHILS NFR BLD AUTO: 0.4 %
BILIRUB SERPL-MCNC: 0.3 MG/DL (ref 0–1.2)
BUN SERPL-MCNC: 21 MG/DL (ref 6–23)
BURR CELLS BLD QL SMEAR: NORMAL
CALCIUM SERPL-MCNC: 9.1 MG/DL (ref 8.6–10.3)
CHLORIDE SERPL-SCNC: 104 MMOL/L (ref 98–107)
CO2 SERPL-SCNC: 28 MMOL/L (ref 21–32)
CREAT SERPL-MCNC: 0.83 MG/DL (ref 0.5–1.05)
EGFRCR SERPLBLD CKD-EPI 2021: 75 ML/MIN/1.73M*2
EOSINOPHIL # BLD AUTO: 0.26 X10*3/UL (ref 0–0.4)
EOSINOPHIL NFR BLD AUTO: 1.7 %
ERYTHROCYTE [DISTWIDTH] IN BLOOD BY AUTOMATED COUNT: 13.8 % (ref 11.5–14.5)
FERRITIN SERPL-MCNC: 82 NG/ML (ref 8–150)
GLUCOSE SERPL-MCNC: 116 MG/DL (ref 74–99)
HCT VFR BLD AUTO: 41 % (ref 36–46)
HGB BLD-MCNC: 12.6 G/DL (ref 12–16)
IMM GRANULOCYTES # BLD AUTO: 0.06 X10*3/UL (ref 0–0.5)
IMM GRANULOCYTES NFR BLD AUTO: 0.4 % (ref 0–0.9)
IRON SATN MFR SERPL: 20 % (ref 25–45)
IRON SERPL-MCNC: 57 UG/DL (ref 35–150)
LYMPHOCYTES # BLD AUTO: 9.43 X10*3/UL (ref 0.8–3)
LYMPHOCYTES NFR BLD AUTO: 60 %
MCH RBC QN AUTO: 31.2 PG (ref 26–34)
MCHC RBC AUTO-ENTMCNC: 30.7 G/DL (ref 32–36)
MCV RBC AUTO: 102 FL (ref 80–100)
MONOCYTES # BLD AUTO: 0.88 X10*3/UL (ref 0.05–0.8)
MONOCYTES NFR BLD AUTO: 5.6 %
NEUTROPHILS # BLD AUTO: 5.02 X10*3/UL (ref 1.6–5.5)
NEUTROPHILS NFR BLD AUTO: 31.9 %
NRBC BLD-RTO: 0 /100 WBCS (ref 0–0)
PLATELET # BLD AUTO: 194 X10*3/UL (ref 150–450)
POTASSIUM SERPL-SCNC: 4.4 MMOL/L (ref 3.5–5.3)
PROT SERPL-MCNC: 6.3 G/DL (ref 6.4–8.2)
RBC # BLD AUTO: 4.04 X10*6/UL (ref 4–5.2)
RBC MORPH BLD: NORMAL
SODIUM SERPL-SCNC: 139 MMOL/L (ref 136–145)
TIBC SERPL-MCNC: 285 UG/DL (ref 240–445)
UIBC SERPL-MCNC: 228 UG/DL (ref 110–370)
VIT B12 SERPL-MCNC: 333 PG/ML (ref 211–911)
WBC # BLD AUTO: 15.7 X10*3/UL (ref 4.4–11.3)

## 2024-09-18 PROCEDURE — 82607 VITAMIN B-12: CPT

## 2024-09-18 PROCEDURE — 83540 ASSAY OF IRON: CPT

## 2024-09-18 PROCEDURE — 85025 COMPLETE CBC W/AUTO DIFF WBC: CPT

## 2024-09-18 PROCEDURE — 2550000001 HC RX 255 CONTRASTS: Performed by: STUDENT IN AN ORGANIZED HEALTH CARE EDUCATION/TRAINING PROGRAM

## 2024-09-18 PROCEDURE — 83550 IRON BINDING TEST: CPT

## 2024-09-18 PROCEDURE — 82728 ASSAY OF FERRITIN: CPT

## 2024-09-18 PROCEDURE — 36415 COLL VENOUS BLD VENIPUNCTURE: CPT

## 2024-09-18 PROCEDURE — 80053 COMPREHEN METABOLIC PANEL: CPT

## 2024-09-18 PROCEDURE — 71260 CT THORAX DX C+: CPT

## 2024-09-25 ENCOUNTER — APPOINTMENT (OUTPATIENT)
Dept: RADIATION ONCOLOGY | Facility: CLINIC | Age: 74
End: 2024-09-25
Payer: MEDICARE

## 2024-09-30 ENCOUNTER — OFFICE VISIT (OUTPATIENT)
Dept: HEMATOLOGY/ONCOLOGY | Facility: CLINIC | Age: 74
End: 2024-09-30
Payer: MEDICARE

## 2024-09-30 VITALS
TEMPERATURE: 97.5 F | BODY MASS INDEX: 38.35 KG/M2 | HEART RATE: 59 BPM | HEIGHT: 60 IN | WEIGHT: 195.33 LBS | RESPIRATION RATE: 17 BRPM | DIASTOLIC BLOOD PRESSURE: 69 MMHG | SYSTOLIC BLOOD PRESSURE: 113 MMHG | OXYGEN SATURATION: 94 %

## 2024-09-30 DIAGNOSIS — R91.8 MULTIPLE PULMONARY NODULES: Primary | ICD-10-CM

## 2024-09-30 DIAGNOSIS — I48.91 ATRIAL FIBRILLATION BY ELECTROCARDIOGRAM (MULTI): ICD-10-CM

## 2024-09-30 DIAGNOSIS — C34.90 ADENOCARCINOMA OF LUNG, UNSPECIFIED LATERALITY (MULTI): ICD-10-CM

## 2024-09-30 DIAGNOSIS — C91.10 CLL (CHRONIC LYMPHOCYTIC LEUKEMIA) (MULTI): ICD-10-CM

## 2024-09-30 PROCEDURE — 1159F MED LIST DOCD IN RCRD: CPT | Performed by: PHYSICIAN ASSISTANT

## 2024-09-30 PROCEDURE — 3074F SYST BP LT 130 MM HG: CPT | Performed by: PHYSICIAN ASSISTANT

## 2024-09-30 PROCEDURE — 3008F BODY MASS INDEX DOCD: CPT | Performed by: PHYSICIAN ASSISTANT

## 2024-09-30 PROCEDURE — 3048F LDL-C <100 MG/DL: CPT | Performed by: PHYSICIAN ASSISTANT

## 2024-09-30 PROCEDURE — 99214 OFFICE O/P EST MOD 30 MIN: CPT | Performed by: PHYSICIAN ASSISTANT

## 2024-09-30 PROCEDURE — 3078F DIAST BP <80 MM HG: CPT | Performed by: PHYSICIAN ASSISTANT

## 2024-09-30 PROCEDURE — 1125F AMNT PAIN NOTED PAIN PRSNT: CPT | Performed by: PHYSICIAN ASSISTANT

## 2024-09-30 SDOH — ECONOMIC STABILITY: FOOD INSECURITY: WITHIN THE PAST 12 MONTHS, THE FOOD YOU BOUGHT JUST DIDN'T LAST AND YOU DIDN'T HAVE MONEY TO GET MORE.: NEVER TRUE

## 2024-09-30 SDOH — ECONOMIC STABILITY: FOOD INSECURITY: WITHIN THE PAST 12 MONTHS, YOU WORRIED THAT YOUR FOOD WOULD RUN OUT BEFORE YOU GOT MONEY TO BUY MORE.: NEVER TRUE

## 2024-09-30 ASSESSMENT — PAIN SCALES - GENERAL: PAINLEVEL: 4

## 2024-09-30 NOTE — PROGRESS NOTES
Patient ID: Alysia Magdaleno is a 74 y.o. female.  Referring Physician: Darrell Mendez MD  [id number] [place] [address]  Primary Care Provider: Xi Dwyer MD      Subjective    HPI    Referred by Dr. Glover      Reason for referral: lung cancer, hx of CLL      HPI     73 year old woman with hx of CLL diagnosed since 2015 on surveillance and has not required treatment, smoking, COPD on oxygen, achalasia  by esophagogram, Afib, HTN, obesity, CHF, depression, DM with neuropathy, GERD, HL, IBS, OA, Rheumatoid arthritis who is referred for recently diagnosed lung adenocarcinoma.   The patient was followed with serial low dose CT scans Lung cancer screening   8/9/22 CT scan low dose showed a LIRADS 4b lesion suspicious in the LLL GGO possibly neoplastic vs inflammatory /infectious , and stable superior segment of LLL GGO, a 3 months follow up scan was recommended   1/14/23 CT low dose, interval increase in mixed solid and GGO in apical segment of the LLL measuring 2.3 x 1 cm increasing from 1.1 cm, interval development of a solid component measuring 0.7 cm, PET recommended, new 1 x 0.5 cm LLL opacity , additional  GGO   2/3/23 PET scan : GGO in the apical segment of LLL and CHELY are not FDG avid, low grade neoplastic process vs inflammatory   5/3/23 CT chest low dose: LIRADS 4X,S (very suspicious) lesion increased density of the apical segment LLL density, measured 2.6 cm from 2.3 cm and increased solid component measured 0.9 cm from 0.7 cm , CHELY GG density measuring up to 1.6 cm stable from  immediate prior but increased from previous could represent adenocarcinoma in situ, subsolid density measuring 1.1 cm in Right lung base, likely inflammatory   Referred to thoracic surgery   7/19/23: biopsy of LLL solid component of the density: invasive well differentiated adenoca, PDL1 < 1%, NGS showed KRAS G12C mutation   PFTs completed FEV1 81% and DLCO 44%, uses O2, deemed not a surgical  candidate and referred to rad onc and med onc   Patient had CT scan in 2024 that showed around 5 cm opacity in the CHELY   This are of mass like consolidation had increased size and fDG avidity on PET scan of 2024   She then had CT guided biopsy 2024 that showed adenocarcinoma   Case discussed on TB with radiation oncology , recommended for another surveillance scan, the carcinoma area thought to be scant in the background of necrosis    Other Labs:   Chronic leukocytosis with lymphocytes predominance flow cytometry from  showed population CD5+CD23+ CD10- consistent with CLL, FISH, IGHV mutation status and NGS lymphoid not ordered   Wbc stable 20-30K , hg from  10.2 g/dl, ferritin of 27 and Tsat of 9%       she was suffering from LE edema for months, it had improved finally, received dieretics and antibiotics   no DVT hx   she has chronic NORTH, has occasional cough, denies hemoptysis   she uses oxygen at night around 2 L/min   eating and drinking well   no nausea or vomiting   she gets today her second iron dose   no bleeding, no hematochezia or melena   has fatigue and tiredness   no weight loss   no fever or chills, no night sweats     PAST MEDICAL HISTORY:   CLL diagnosed since 2015 on surveillance and has not required treatment, smoking, COPD on oxygen, achalasia by esophagogram, Afib, HTN, obesity, CHF, depression, DM with neuropathy, GERD,  HL, IBS, OA, Rheumatoid/psoriatic arthritis on MTX      SOCIAL HISTORY:   quit smoking 7 years ago, smoked 1 ppd x 40 yrs   no alcohol   has one daughter and one granddaughter   she worked in a factory/ machine equipment      FAMILY HISTORY:    mother  of colon cancer, sister had thyroid cancer   No other specific history of bleeding, clotting or malignant disorder in the family.    Interval history:  Patient presents for follow up. Reports being fatigued but has been awake since yesterday. Since decluttering her house she has had no falls. Reports  feeling better with breathing stable. Continues to use her inhalers. Appetite and weight stable. Otherwise doing well with no new complaints.      REVIEW OF SYSTEMS:  All of the systems have been reviewed and are negative for complaints except what is stated in the HPI and/or past medical history.    Allergies:  Allergies   Allergen Reactions    Adhesive Tape-Silicones Itching    Aspirin Hives and Unknown    Hydrocodone-Acetaminophen Unknown     nausea    Morphine Hives    Naproxen Unknown    Niacin Unknown    Nsaids (Non-Steroidal Anti-Inflammatory Drug) Unknown and Bleeding    Opioids-Meperidine And Related Other    Tizanidine Unknown    Other Rash     gold plated metal     Outpatient medication:  Current Outpatient Medications on File Prior to Visit   Medication Sig Dispense Refill    albuterol (ProAir HFA) 90 mcg/actuation inhaler Inhale 2 puffs 4 times a day.      apixaban (Eliquis) 5 mg tablet Take 1 tablet (5 mg) by mouth 2 times a day. 180 tablet 1    atorvastatin (Lipitor) 20 mg tablet Take 1 tablet (20 mg) by mouth once daily at bedtime. 90 tablet 1    budesonide-glycopyr-formoterol (BREZTRI) 160-9-4.8 mcg/actuation HFA aerosol inhaler Inhale 2 puffs 2 times a day. (Patient not taking: Reported on 9/5/2024) 10.7 g 11    cloNIDine (Catapres) 0.1 mg tablet take 1 tablet by mouth every 8 hours 270 tablet 0    dexlansoprazole (Dexilant) 60 mg DR capsule take 1 capsule by mouth every morning TAKE BEFORE MEALS 90 capsule 0    DULoxetine (Cymbalta) 60 mg DR capsule Take 1 capsule (60 mg) by mouth once daily. Do not crush or chew. 30 capsule 11    famotidine (Pepcid) 40 mg tablet take 1 tablet by mouth once daily 30 tablet 0    fluticasone-umeclidin-vilanter (Trelegy Ellipta) 200-62.5-25 mcg blister with device Inhale 1 puff early in the morning. for 28 days. 28 each 0    folic acid (Folvite) 1 mg tablet Take 1 tablet (1 mg) by mouth once daily. 90 tablet 0    gabapentin (Neurontin) 300 mg capsule Take 1 capsule  "(300 mg) by mouth 3 times a day. 90 capsule 5    loratadine 10 mg capsule Take by mouth.      methotrexate (Trexall) 2.5 mg tablet Take 6 tablets (15 mg total) by mouth 1 (one) time per week.  Follow directions carefully, and ask to explain any part you do not understand. Take exactly as directed. 72 tablet 0    metoprolol succinate XL (Toprol-XL) 50 mg 24 hr tablet Take 1 tablet (50 mg) by mouth once daily. Do not crush or chew. 90 tablet 3    propafenone (Rythmol) 225 mg tablet Take 1 tablet (225 mg) by mouth every 8 hours. 270 tablet 1    roflumilast (Daliresp) 500 mcg tablet Take 1 tablet (500 mcg) by mouth once daily. 90 tablet 3    sulfaSALAzine (Azulfidine) 500 mg tablet Take 1 tablet (500 mg) by mouth 2 times a day. 180 tablet 0    traZODone (Desyrel) 50 mg tablet take 1 tablet by mouth at bedtime if needed for sleep 90 tablet 0     No current facility-administered medications on file prior to visit.     Vitals:      6/6/2024     2:37 PM 6/18/2024     1:10 PM 7/17/2024     1:34 PM 7/30/2024     2:40 PM 8/12/2024     3:10 PM 9/5/2024     3:22 PM 9/30/2024     9:54 AM   Vitals   Systolic 122 143 110 145 126 110 113   Diastolic 74 79 68 82 80 62 69   Heart Rate 77 88 76 78 64 60 59   Temp  36.5 °C (97.7 °F)     36.4 °C (97.5 °F)   Resp  20  16   17   Height (in) 1.524 m (5')  1.524 m (5') 1.524 m (5') 1.524 m (5')  1.519 m (4' 11.8\")   Weight (lb) 202.6 202.1 202 197.5 197 195.4 195.33   BMI 39.57 kg/m2 39.47 kg/m2 39.45 kg/m2 38.57 kg/m2 38.47 kg/m2 38.16 kg/m2 38.4 kg/m2   BSA (m2) 1.97 m2 1.97 m2 1.97 m2 1.95 m2 1.95 m2 1.94 m2 1.93 m2   Visit Report Report  Report Report Report Report Report     PHYSICAL EXAMINATION:    GENERAL:  Age-appropriate, in no acute discomfort.    VITAL SIGNS:  Reviewed in the EMR    HEENT:  Normocephalic and atraumatic.  Mucous membranes are moist. No oral lesions.    NECK:  Supple without lymphadenopathy.  No thyromegaly or bruits.    CHEST: bilaterally decreased breath sounds , " "area of tenderness on her right ribs   HEART:  Regular bradycardic   ABDOMEN:  Soft, nontender, and nondistended. No hepatosplenomegaly.    NEUROLOGICAL:  Alert, awake, and oriented.   LYMPHATICS: No significant lymphadenopathy.    Labs:  Lab Results   Component Value Date    WBC 15.7 (H) 09/18/2024    NEUTROABS 5.02 09/18/2024    IGABSOL 0.06 09/18/2024    LYMPHSABS 9.43 (H) 09/18/2024    MONOSABS 0.88 (H) 09/18/2024    EOSABS 0.26 09/18/2024    BASOSABS 0.06 09/18/2024    RBC 4.04 09/18/2024     (H) 09/18/2024    MCHC 30.7 (L) 09/18/2024    HGB 12.6 09/18/2024    HCT 41.0 09/18/2024     09/18/2024     No results found for: \"RETICCTPCT\"   Lab Results   Component Value Date    CREATININE 0.83 09/18/2024    BUN 21 09/18/2024    EGFR 75 09/18/2024     09/18/2024    K 4.4 09/18/2024     09/18/2024    CO2 28 09/18/2024      Lab Results   Component Value Date    ALT 9 09/18/2024    AST 13 09/18/2024    ALKPHOS 65 09/18/2024    BILITOT 0.3 09/18/2024      Lab Results   Component Value Date    TSH 5.74 (H) 12/11/2023     Lab Results   Component Value Date    TSH 5.74 (H) 12/11/2023     Lab Results   Component Value Date    IRON 57 09/18/2024    TIBC 285 09/18/2024    FERRITIN 82 09/18/2024      Lab Results   Component Value Date    LQQUTMSC55 333 09/18/2024      Images:  CT chest w IV contrast    Result Date: 9/23/2024  Interpreted By:  Jerod Yan, STUDY: CT CHEST W IV CONTRAST; 9/18/2024 2:17 pm   INDICATION: Signs/Symptoms:Follow up for stage IA3 [id number] left lower lobe adenocarcinoma s/p definitive SBRT 55 Gy/5 fractions completed 9/20/23.   COMPARISON: 06/12/2024   ACCESSION NUMBER(S): [id number]   ORDERING CLINICIAN: TRE DAILY   TECHNIQUE: The examination was performed with the intravenous injection of 75 ml of non-ionic iodinated contrast was injected intravenously.   A helical acquisition data was obtained with sagittal coronal reconstructions performed.   One or more of the " following dose reduction techniques were used: Automated exposure control Adjustment of the mA and/or kV according to patient size, and/or use of iterative reconstruction technique.   FINDINGS: There is a slightly prominent right hilar lymph node with short axis diameter of approximately 1.1-1.2 cm. No mediastinal lymphadenopathy. No left hilar lymphadenopathy is identified.   There is a new 7 mm nodule right apex, axial slice 64 series 3. There is a stable appearance of the mass within the superior segment left lower lobe. There is clearing of the previously identified ground-glass density within the left upper lobe anteriorly. No effusions are identified.   Chest Wall: No chest wall masses are identified.   Upper Abdominal Findings: Incidental note is made of the presence of surgical clips in the right upper quadrant consistent with a previous cholecystectomy. Focal parenchymal loss is noted at the upper pole right kidney likely from previous infection or infarction. Small accessory splenule is present.   Skeletal System: No fractures or destructive lesions are identified.       1. Stable slightly prominent right hilar lymph node. 2. Stable masslike infiltrate superior segment left lower lobe. 3. Clearing ground-glass infiltrate anteriorly left upper lobe. 4. Remainder of the findings are unchanged.   MACRO: none   Signed by: Jerod Yan 9/23/2024 3:35 PM Dictation workstation:   [id number]      Assessment/Plan      1. Lung adenocarcinoma      cT1cN0 noting her disease is not FDG avid and she never had a regular CT chest   additional CHELY nodules/GGO changes of unclear significance but possibly inflammatory   not a surgical candidate   she is planned for SBRT by radiation oncology   we discussed no current indication for chemo   but I recommended she gets CT scans of chest/Abd/pelvis at least subsequently post SBRT for her disease monitor     4/27/24-     Patient s/p SBRT to the lung followed by surveillance  "  Patient had CT scan in feb 2024 that showed around 5 cm opacity in the CHELY   This are of mass like consolidation had increased size and fDG avidity on PET scan of March 2024   She then had CT guided biopsy April 2024 that showed adenocarcinoma     Case discussed on TB with radiation oncology , recommended for another surveillance scan, the carcinoma area thought to be scant in the background of necrosis    She was therefore recommended for another surveillance CT scan, ordered by rad onc, will assist with scheduling   MRI brain also to be scheduled     Today however she presents after a fall and is bradycardic in the clinic, will refer to ER for further assessment, including EKG and chest imaging     9/30/2024:  CT scan with \"stable disease\" and new 7 mm nodule right apex,will see rad onc tomorrow   Scheduled another surveillance CT scan in 3 months  Referred to med onc closer to home       2. CLL   her anemia is related to iron deficiency and less likely CLL   her wbc has been overall stable   pending correction of anemia after iron replacement, no indications for treatment at this time   never had FISH for CLL or NGS lymphoid, will order   continue surveillance for CLL     3. Anemia   secondary to iron deficiency   receiving an IV iron course   to repeat following replacement   had colonoscopy last year with polyps detected     4/27- hg stable, mild anemia, order iron labs for next visit    9/30- Anemia stable, no need for IV iron, recommend Vitamin B12 1000 mcg daily     RTC 3 m after imaging     Patient verbalized understanding, and all her questions were answered to her satisfaction    30 min spent with patient greater than 50 % of which was spent in consultation and coordination of care.                   "

## 2024-10-01 ENCOUNTER — HOSPITAL ENCOUNTER (OUTPATIENT)
Dept: RADIATION ONCOLOGY | Facility: CLINIC | Age: 74
Setting detail: RADIATION/ONCOLOGY SERIES
Discharge: HOME | End: 2024-10-01
Payer: MEDICARE

## 2024-10-01 VITALS
OXYGEN SATURATION: 93 % | RESPIRATION RATE: 18 BRPM | DIASTOLIC BLOOD PRESSURE: 60 MMHG | BODY MASS INDEX: 38.49 KG/M2 | TEMPERATURE: 97 F | WEIGHT: 195.8 LBS | SYSTOLIC BLOOD PRESSURE: 123 MMHG | HEART RATE: 67 BPM

## 2024-10-01 DIAGNOSIS — C34.92 ADENOCARCINOMA OF LEFT LUNG (MULTI): Primary | ICD-10-CM

## 2024-10-01 DIAGNOSIS — C34.32 MALIGNANT NEOPLASM OF LOWER LOBE OF LEFT LUNG (MULTI): ICD-10-CM

## 2024-10-01 PROCEDURE — 99214 OFFICE O/P EST MOD 30 MIN: CPT | Performed by: STUDENT IN AN ORGANIZED HEALTH CARE EDUCATION/TRAINING PROGRAM

## 2024-10-01 ASSESSMENT — ENCOUNTER SYMPTOMS
OCCASIONAL FEELINGS OF UNSTEADINESS: 0
DEPRESSION: 0
LOSS OF SENSATION IN FEET: 0

## 2024-10-01 ASSESSMENT — COLUMBIA-SUICIDE SEVERITY RATING SCALE - C-SSRS
1. IN THE PAST MONTH, HAVE YOU WISHED YOU WERE DEAD OR WISHED YOU COULD GO TO SLEEP AND NOT WAKE UP?: NO
2. HAVE YOU ACTUALLY HAD ANY THOUGHTS OF KILLING YOURSELF?: NO
6. HAVE YOU EVER DONE ANYTHING, STARTED TO DO ANYTHING, OR PREPARED TO DO ANYTHING TO END YOUR LIFE?: NO

## 2024-10-01 ASSESSMENT — PAIN SCALES - GENERAL: PAINLEVEL: 0-NO PAIN

## 2024-10-01 NOTE — PROGRESS NOTES
Radiation Oncology Follow-Up    Patient Name:  Alysia Magdaleno  MRN:  [id number]  :  1950    Referring Provider: No ref. provider found  Primary Care Provider: Xi Dwyer MD  Care Team: Patient Care Team:  Xi Dwyer MD as PCP - General (Family Medicine)  Xi Dwyer MD as PCP - [facility]ributed [facility]  Darrell Mendez MD as Medical Oncologist (Hematology and Oncology)    Date of Service: 10/1/2024    SUBJECTIVE  History of Present Illness:  Alysia Magdaleno is a 74 y.o. female who was previously seen at the [facility] Department of Radiation Oncology for stage IA3 [id number] left lower lobe adenocarcinoma s/p definitive SBRT 55 Gy/5 fractions completed 23. She also has a history of CLL.     PET/CT on 3/14/24 showed focal uptake in the LLL mass that was increased compared to 2023 (SUV 5.1 compared 1.4). There was no evidence of musa or distant metastases. She underwent CT-guided biopsy of the LLL mass on 24 and pathology showed adenocarcinoma (lepidic pattern). NGS testing could not be done because of insufficient tumor cellularity. Her imaging and pathology were discussed at thoracic tumor board, and it was felt that the scant adenocarcinoma cells in the setting of fibrosis may not represent true local recurrence given that studies of lung resection following SBRT showed less than complete response even months later. The recommendation was for repeat chest imaging. No re-irradiation or salvage systemic therapy was recommended.    Her latest CT chest on 24 showed stable appearance of the LLL masslike infiltrate and a slightly prominent right hilar node compared to 2024. There was a new 7 mm RUL nodule noted.    Today, she feels well overall. Her breathing has been about the same with use of NC oxygen at night only. She denies chest pain, hemoptysis, headache, vision changes, nausea/vomiting, or focal weakness/numbness.    Treatment  Rendered:   Left lung SBRT 55 Gy/5 fractions completed 9/20/23          Review of Systems:   Review of Systems   All other systems reviewed and are negative.      Performance Status:   The Karnofsky performance scale today is 70, Cares for self; unable to carry on normal activity or to do active work (ECOG equivalent 1).       OBJECTIVE  Vital Signs:  /60   Pulse 67   Temp 36.1 °C (97 °F) (Temporal)   Resp 18   Wt 88.8 kg (195 lb 12.8 oz)   SpO2 93%   BMI 38.49 kg/m²   Physical Exam  Constitutional:       General: She is not in acute distress.     Appearance: She is obese. She is not toxic-appearing.   HENT:      Head: Normocephalic and atraumatic.      Mouth/Throat:      Mouth: Mucous membranes are moist.      Pharynx: Oropharynx is clear. No oropharyngeal exudate or posterior oropharyngeal erythema.   Eyes:      General: No scleral icterus.     Extraocular Movements: Extraocular movements intact.      Conjunctiva/sclera: Conjunctivae normal.      Pupils: Pupils are equal, round, and reactive to light.   Cardiovascular:      Rate and Rhythm: Normal rate and regular rhythm.      Heart sounds: No murmur heard.     No friction rub. No gallop.   Pulmonary:      Effort: Pulmonary effort is normal. No respiratory distress.      Breath sounds: Normal breath sounds. No stridor. No wheezing, rhonchi or rales.   Chest:      Chest wall: No tenderness.   Musculoskeletal:         General: Normal range of motion.      Cervical back: Normal range of motion and neck supple.      Right lower leg: No edema.      Left lower leg: No edema.   Skin:     General: Skin is warm and dry.      Coloration: Skin is not jaundiced.      Findings: No lesion or rash.   Neurological:      General: No focal deficit present.      Mental Status: She is alert and oriented to person, place, and time.      Cranial Nerves: No cranial nerve deficit.      Sensory: No sensory deficit.      Motor: No weakness.      Coordination: Coordination  normal.      Gait: Gait normal.   Psychiatric:         Mood and Affect: Mood normal.         Behavior: Behavior normal.            ASSESSMENT:   Alysia Magdaleno is a 74 y.o. female with stage IA3 [id number] left lower lobe adenocarcinoma s/p definitive SBRT 55 Gy/5 fractions completed 9/20/23.      Her CT c/a/p on 2/12/24 and PET/CT on 3/14/24 showed enlargement of the treated LLL mass with increased uptake concerning for local recurrence, with no evidence of musa or distant metastases. CT-guided biopsy of the mass on 4/4/24 showed scant adenocarcinoma cells in the setting of extensive fibrosis. Thoracic tumor board did not recommend salvage therapy as it was too soon to call this local recurrence.    Her latest CT chest on 9/18/24 showed stable appearance of the LLL mass compared to 6/2024. There was a new 7 mm RUL nodule noted.     She maintains fair performance status (KPS 70) and her respiratory status has been stable. With no evidence of disease progression I do not recommend any biopsy or re-irradiation at this time.    She is scheduled for CT c/a/p 12/30/24. I will see her for follow up in 1/2025.    NCCN Guidelines were applicable to guide this patients treatment plan.    Bakari Mead MD

## 2024-10-01 NOTE — PROGRESS NOTES
Radiation Oncology Nursing Note    Pain: The patient's current pain level was assessed.  They report currently having a pain of 0 out of 10.  They feel their pain is under control with the use of pain medications. Has arthritic pain.    Review of Systems:  Review of Systems - Oncology

## 2024-10-02 RX ORDER — PROPAFENONE HYDROCHLORIDE 225 MG/1
225 TABLET, COATED ORAL EVERY 8 HOURS
Qty: 270 TABLET | Refills: 1 | Status: SHIPPED | OUTPATIENT
Start: 2024-10-02

## 2024-10-03 DIAGNOSIS — I48.91 ATRIAL FIBRILLATION BY ELECTROCARDIOGRAM (MULTI): ICD-10-CM

## 2024-10-03 DIAGNOSIS — I50.32 CHRONIC DIASTOLIC HEART FAILURE: ICD-10-CM

## 2024-10-03 RX ORDER — METOPROLOL SUCCINATE 50 MG/1
50 TABLET, EXTENDED RELEASE ORAL DAILY
Qty: 90 TABLET | Refills: 3 | Status: SHIPPED | OUTPATIENT
Start: 2024-10-03 | End: 2025-10-03

## 2024-10-03 NOTE — TELEPHONE ENCOUNTER
Pt called and requested RF of duloxetine 60 mg 1 capsule po daily.  Pt has RX for this with 11 RF @ [facility].  Called and left VM for pt to call the office.  Jenna Bustos RN

## 2024-10-07 ENCOUNTER — TELEPHONE (OUTPATIENT)
Dept: PAIN MEDICINE | Facility: HOSPITAL | Age: 74
End: 2024-10-07
Payer: MEDICARE

## 2024-10-07 NOTE — TELEPHONE ENCOUNTER
Pt called requesting RF of gabapentin be sent to new pharmacy [facility].  Called pt and advised her she has RX for gabapentin with 5 RF @ [facility] and she would need to call and have them transfer the RX to her new pharmacy.  Left VM relaying the above to the patient.  Jenna Bustos RN

## 2024-10-15 ENCOUNTER — TELEPHONE (OUTPATIENT)
Dept: PRIMARY CARE | Facility: CLINIC | Age: 74
End: 2024-10-15
Payer: MEDICARE

## 2024-10-15 NOTE — TELEPHONE ENCOUNTER
Patient is not available. Patient called and left a message for a prescription refill. Could not understand which medication she would like a refill on. If patient call the office please verify medication refill request    1st attempt

## 2024-10-22 DIAGNOSIS — M05.79 RHEUMATOID ARTHRITIS INVOLVING MULTIPLE SITES WITH POSITIVE RHEUMATOID FACTOR (MULTI): ICD-10-CM

## 2024-10-22 RX ORDER — SULFASALAZINE 500 MG/1
500 TABLET ORAL 2 TIMES DAILY
Qty: 180 TABLET | Refills: 0 | Status: SHIPPED | OUTPATIENT
Start: 2024-10-22 | End: 2025-01-20

## 2024-10-23 DIAGNOSIS — I10 BENIGN ESSENTIAL HYPERTENSION: ICD-10-CM

## 2024-10-23 RX ORDER — CLONIDINE HYDROCHLORIDE 0.1 MG/1
0.1 TABLET ORAL EVERY 8 HOURS
Qty: 270 TABLET | Refills: 0 | Status: SHIPPED | OUTPATIENT
Start: 2024-10-23

## 2024-10-23 NOTE — TELEPHONE ENCOUNTER
Patient called to request medication refill.    Last appointment with our providers: 06/06/2024    Next appointment with our providers: 12/05/2024  Name of Medication:   cloNIDine (Catapres) 0.1 mg tablet       Pharmacy: [facility]

## 2024-11-06 ENCOUNTER — APPOINTMENT (OUTPATIENT)
Dept: PRIMARY CARE | Facility: CLINIC | Age: 74
End: 2024-11-06
Payer: MEDICARE

## 2024-12-05 ENCOUNTER — APPOINTMENT (OUTPATIENT)
Dept: PRIMARY CARE | Facility: CLINIC | Age: 74
End: 2024-12-05
Payer: MEDICARE

## 2024-12-16 DIAGNOSIS — I48.91 ATRIAL FIBRILLATION BY ELECTROCARDIOGRAM (MULTI): ICD-10-CM

## 2024-12-17 RX ORDER — APIXABAN 5 MG/1
5 TABLET, FILM COATED ORAL 2 TIMES DAILY
Qty: 180 TABLET | Refills: 2 | Status: SHIPPED | OUTPATIENT
Start: 2024-12-17

## 2024-12-19 ENCOUNTER — APPOINTMENT (OUTPATIENT)
Dept: RHEUMATOLOGY | Facility: CLINIC | Age: 74
End: 2024-12-19
Payer: MEDICARE

## 2024-12-30 ENCOUNTER — APPOINTMENT (OUTPATIENT)
Dept: RADIOLOGY | Facility: HOSPITAL | Age: 74
End: 2024-12-30
Payer: MEDICARE

## 2025-01-02 ENCOUNTER — TELEPHONE (OUTPATIENT)
Dept: CARDIOLOGY | Facility: HOSPITAL | Age: 75
End: 2025-01-02

## 2025-01-02 ENCOUNTER — APPOINTMENT (OUTPATIENT)
Facility: CLINIC | Age: 75
End: 2025-01-02
Payer: MEDICARE

## 2025-01-02 NOTE — TELEPHONE ENCOUNTER
LVM for pt to r/s 6 month visit with Zaira SNIDER from 1/17 (out of office). Req pt call office back to r/s.

## 2025-01-06 ENCOUNTER — APPOINTMENT (OUTPATIENT)
Dept: HEMATOLOGY/ONCOLOGY | Facility: CLINIC | Age: 75
End: 2025-01-06
Payer: MEDICARE

## 2025-01-06 ENCOUNTER — APPOINTMENT (OUTPATIENT)
Dept: RADIATION ONCOLOGY | Facility: CLINIC | Age: 75
End: 2025-01-06
Payer: MEDICARE

## 2025-01-13 ENCOUNTER — TELEPHONE (OUTPATIENT)
Dept: CARDIOLOGY | Facility: HOSPITAL | Age: 75
End: 2025-01-13
Payer: MEDICARE

## 2025-01-13 DIAGNOSIS — M05.79 RHEUMATOID ARTHRITIS INVOLVING MULTIPLE SITES WITH POSITIVE RHEUMATOID FACTOR (MULTI): ICD-10-CM

## 2025-01-13 DIAGNOSIS — M79.7 FIBROMYALGIA: ICD-10-CM

## 2025-01-13 RX ORDER — SULFASALAZINE 500 MG/1
500 TABLET ORAL 2 TIMES DAILY
Qty: 180 TABLET | Refills: 0 | Status: SHIPPED | OUTPATIENT
Start: 2025-01-13 | End: 2025-04-13

## 2025-01-13 RX ORDER — FOLIC ACID 1 MG/1
1 TABLET ORAL DAILY
Qty: 90 TABLET | Refills: 0 | OUTPATIENT
Start: 2025-01-13 | End: 2025-04-13

## 2025-01-13 RX ORDER — FOLIC ACID 1 MG/1
1 TABLET ORAL DAILY
Qty: 90 TABLET | Refills: 0 | Status: SHIPPED | OUTPATIENT
Start: 2025-01-13 | End: 2025-04-13

## 2025-01-13 RX ORDER — DULOXETIN HYDROCHLORIDE 60 MG/1
60 CAPSULE, DELAYED RELEASE ORAL DAILY
Qty: 30 CAPSULE | Refills: 11 | OUTPATIENT
Start: 2025-01-13

## 2025-01-13 RX ORDER — SULFASALAZINE 500 MG/1
500 TABLET ORAL 2 TIMES DAILY
Qty: 180 TABLET | Refills: 0 | OUTPATIENT
Start: 2025-01-13 | End: 2025-04-13

## 2025-01-13 NOTE — TELEPHONE ENCOUNTER
LVM x2 for pt to reschedule EP follow up with Zaira SNIDER (out of office 1/17). Moved pt appt to 1/31 1pm - req she call the office to please confirm this change. Both calls went straight to , but was able to leave message.

## 2025-01-15 DIAGNOSIS — I10 BENIGN ESSENTIAL HYPERTENSION: ICD-10-CM

## 2025-01-15 RX ORDER — CLONIDINE HYDROCHLORIDE 0.1 MG/1
TABLET ORAL
Qty: 270 TABLET | Refills: 3 | Status: SHIPPED | OUTPATIENT
Start: 2025-01-15

## 2025-01-17 ENCOUNTER — APPOINTMENT (OUTPATIENT)
Dept: CARDIOLOGY | Facility: CLINIC | Age: 75
End: 2025-01-17
Payer: MEDICARE

## 2025-01-17 DIAGNOSIS — J43.9 PULMONARY EMPHYSEMA, UNSPECIFIED EMPHYSEMA TYPE (MULTI): ICD-10-CM

## 2025-01-17 RX ORDER — FLUTICASONE FUROATE, UMECLIDINIUM BROMIDE AND VILANTEROL TRIFENATATE 200; 62.5; 25 UG/1; UG/1; UG/1
1 POWDER RESPIRATORY (INHALATION)
Qty: 60 EACH | Refills: 11 | Status: SHIPPED | OUTPATIENT
Start: 2025-01-17

## 2025-01-28 ENCOUNTER — APPOINTMENT (OUTPATIENT)
Dept: PAIN MEDICINE | Facility: HOSPITAL | Age: 75
End: 2025-01-28
Payer: MEDICARE

## 2025-01-31 ENCOUNTER — APPOINTMENT (OUTPATIENT)
Dept: CARDIOLOGY | Facility: HOSPITAL | Age: 75
End: 2025-01-31
Payer: MEDICARE

## 2025-02-04 ENCOUNTER — HOSPITAL ENCOUNTER (OUTPATIENT)
Dept: RADIOLOGY | Facility: HOSPITAL | Age: 75
End: 2025-02-04
Payer: MEDICARE

## 2025-02-25 ENCOUNTER — APPOINTMENT (OUTPATIENT)
Dept: RADIOLOGY | Facility: HOSPITAL | Age: 75
End: 2025-02-25
Payer: MEDICARE

## 2025-03-18 DIAGNOSIS — I48.91 ATRIAL FIBRILLATION BY ELECTROCARDIOGRAM (MULTI): ICD-10-CM

## 2025-03-25 ENCOUNTER — TELEPHONE (OUTPATIENT)
Dept: PRIMARY CARE | Facility: CLINIC | Age: 75
End: 2025-03-25
Payer: MEDICARE

## 2025-03-25 ENCOUNTER — TELEPHONE (OUTPATIENT)
Dept: CARDIOLOGY | Facility: HOSPITAL | Age: 75
End: 2025-03-25
Payer: MEDICARE

## 2025-03-25 DIAGNOSIS — M79.2 NEUROPATHIC PAIN: ICD-10-CM

## 2025-03-25 DIAGNOSIS — M05.79 RHEUMATOID ARTHRITIS INVOLVING MULTIPLE SITES WITH POSITIVE RHEUMATOID FACTOR (MULTI): ICD-10-CM

## 2025-03-25 DIAGNOSIS — K21.9 GERD WITHOUT ESOPHAGITIS: ICD-10-CM

## 2025-03-25 RX ORDER — DEXLANSOPRAZOLE 60 MG/1
CAPSULE, DELAYED RELEASE ORAL
Qty: 90 CAPSULE | Refills: 0 | Status: SHIPPED | OUTPATIENT
Start: 2025-03-25

## 2025-03-25 RX ORDER — GABAPENTIN 300 MG/1
300 CAPSULE ORAL 3 TIMES DAILY
Qty: 270 CAPSULE | Refills: 0 | Status: SHIPPED | OUTPATIENT
Start: 2025-03-25

## 2025-03-25 RX ORDER — SULFASALAZINE 500 MG/1
500 TABLET ORAL 2 TIMES DAILY
Qty: 180 TABLET | Refills: 0 | Status: SHIPPED | OUTPATIENT
Start: 2025-03-25 | End: 2025-06-23

## 2025-03-25 NOTE — TELEPHONE ENCOUNTER
sulfaSALAzine (Azulfidine) 500 mg tablet [[id number]]    Order Details  Dose: 500 mg Route: oral Frequency: 2 times daily   Dispense Quantity: 180 tablet Refills: 0          Sig: Take 1 tablet (500 mg) by mouth 2 times a day.   methotrexate (Trexall) 2.5 mg tablet [[id number]]      Order Details  Dose: 15 mg Route: oral Frequency: Weekly   Dispense Quantity: 72 tablet Refills: 0          Sig: Take 6 tablets (15 mg total) by mouth 1 (one) time per week.  Follow directions carefully, and ask to explain any part you do not understand. Take exactly as directed.   There done by Dr. Matthews but he left are you able to fill them?    gabapentin (Neurontin) 300 mg capsule [[id number]]    Order Details  Dose: 300 mg Route: oral Frequency: 3 times daily   Dispense Quantity: 90 capsule Refills: 5          Sig: Take 1 capsule (300 mg) by mouth 3 times a day.   dexlansoprazole (Dexilant) 60 mg DR capsule [[id number]]    Order Details  Dose, Route, Frequency: As Directed   Dispense Quantity: 90 capsule Refills: 0          Sig: take 1 capsule by mouth every morning TAKE BEFORE MEALS   Needs a refill on these please send to [facility] in [place]

## 2025-03-25 NOTE — TELEPHONE ENCOUNTER
LVM for pt to r/s her follow up with Zaira SNIDER. Pt will need appt on the books for next med refill.     4/1 - Attempted to call pt again to r/s missed appts with Zaira SNIDER and Dr. Rose. Unable to reach pt and cannot leave message as mailbox is full at this time.

## 2025-03-26 RX ORDER — PROPAFENONE HYDROCHLORIDE 225 MG/1
TABLET, COATED ORAL
Qty: 270 TABLET | Refills: 0 | Status: SHIPPED | OUTPATIENT
Start: 2025-03-26

## 2025-03-27 NOTE — TELEPHONE ENCOUNTER
She called back and I let her know that methotrexate (Trexall) 2.5 mg tablet [[id number]]      Order Details  Dose: 15 mg Route: oral Frequency: Weekly   Dispense Quantity: 72 tablet R    Is . She would like to know would Dr. Dwyer refill it. And she is also on the Sulfasalazine & needs these   folic acid (Folvite) 1 mg tablet [[id number]]    Order Details  Dose: 1 mg Route: oral Frequency: Daily   Dispense Quantity: 90 tablet Refills: 0          Sig: Take 1 tablet (1 mg) by mouth once daily.         Start Date: 25 End Date: 25 after 90 doses   Written Date: 25 Rx Expiration Date: 26    traZODone (Desyrel) 50 mg tablet [[id number]]    Order Details  Dose: 50 mg Route: oral Frequency: Nightly PRN for sleep   Dispense Quantity: 90 tablet Refills: 0          Sig: take 1 tablet by mouth at bedtime if needed for sleep         Start Date: 24 End Date: --   Written Date: 24 Rx Expiration Date: 25    roflumilast (Daliresp) 500 mcg tablet [[id number]]      Order Details  Dose: 500 mcg Route: oral Frequency: Daily   Dispense Quantity: 90 tablet Refills: 3          Sig: Take 1 tablet (500 mcg) by mouth once daily.         Start Date: 23 End Date: 12/10/24 after 365 doses   Written Date: 23 Rx Expiration Date: 12/10/24    nystatin (Mycostatin) cream [[id number]]      Order Details  Dose: -- Route: Topical Frequency: 2 times daily   Dispense Quantity: 30 g Refills: 1          Sig: Apply topically 2 times a day for 14 days. apply to under bilateral breast and bilateral groin at         Start Date: 24 End Date: 24 after 28 doses   Written Date: 24 Rx Expiration Date: 25    Please advise

## 2025-04-18 DIAGNOSIS — G47.00 INSOMNIA, UNSPECIFIED TYPE: ICD-10-CM

## 2025-04-18 RX ORDER — TRAZODONE HYDROCHLORIDE 50 MG/1
50 TABLET ORAL NIGHTLY PRN
Qty: 90 TABLET | Refills: 0 | Status: SHIPPED | OUTPATIENT
Start: 2025-04-18

## 2025-04-18 NOTE — TELEPHONE ENCOUNTER
Rx Refill Request Telephone Encounter    Name:  Alysia Magdaleno  :  [id number]  Medication Name:  Trazadone  50 mg        Take 1 tablet by mouth at bedtime if needed for sleep  [facility]  Date of last appointment:  24

## 2025-05-22 ENCOUNTER — APPOINTMENT (OUTPATIENT)
Dept: RADIATION ONCOLOGY | Facility: CLINIC | Age: 75
End: 2025-05-22
Payer: MEDICARE

## 2025-05-31 ENCOUNTER — HOSPITAL ENCOUNTER (EMERGENCY)
Facility: HOSPITAL | Age: 75
Discharge: HOME | End: 2025-05-31
Payer: MEDICARE

## 2025-05-31 VITALS
BODY MASS INDEX: 35.68 KG/M2 | HEIGHT: 59 IN | OXYGEN SATURATION: 95 % | WEIGHT: 177 LBS | RESPIRATION RATE: 20 BRPM | HEART RATE: 68 BPM | DIASTOLIC BLOOD PRESSURE: 83 MMHG | TEMPERATURE: 98 F | SYSTOLIC BLOOD PRESSURE: 130 MMHG

## 2025-05-31 DIAGNOSIS — S80.861A NONVENOMOUS SPIDER BITE OF RIGHT LOWER LEG, INITIAL ENCOUNTER: Primary | ICD-10-CM
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
## 2025-05-31 PROCEDURE — 2500000005 HC RX 250 GENERAL PHARMACY W/O HCPCS: Performed by: PHYSICIAN ASSISTANT

## 2025-05-31 PROCEDURE — 99283 EMERGENCY DEPT VISIT LOW MDM: CPT

## 2025-05-31 PROCEDURE — 99282 EMERGENCY DEPT VISIT SF MDM: CPT

## 2025-05-31 RX ORDER — MUPIROCIN 20 MG/G
OINTMENT TOPICAL 2 TIMES DAILY
Qty: 22 G | Refills: 0 | Status: SHIPPED | OUTPATIENT
Start: 2025-05-31 | End: 2025-06-07

## 2025-05-31 RX ORDER — BACITRACIN ZINC 500 UNIT/G
OINTMENT IN PACKET (EA) TOPICAL ONCE
Status: COMPLETED | OUTPATIENT
Start: 2025-05-31 | End: 2025-05-31

## 2025-05-31 RX ADMIN — BACITRACIN 1 APPLICATION: 500 OINTMENT TOPICAL at 18:19

## 2025-05-31 ASSESSMENT — COLUMBIA-SUICIDE SEVERITY RATING SCALE - C-SSRS
6. HAVE YOU EVER DONE ANYTHING, STARTED TO DO ANYTHING, OR PREPARED TO DO ANYTHING TO END YOUR LIFE?: NO
2. HAVE YOU ACTUALLY HAD ANY THOUGHTS OF KILLING YOURSELF?: NO
1. IN THE PAST MONTH, HAVE YOU WISHED YOU WERE DEAD OR WISHED YOU COULD GO TO SLEEP AND NOT WAKE UP?: NO

## 2025-05-31 ASSESSMENT — PAIN - FUNCTIONAL ASSESSMENT: PAIN_FUNCTIONAL_ASSESSMENT: 0-10

## 2025-05-31 ASSESSMENT — PAIN SCALES - GENERAL: PAINLEVEL_OUTOF10: 0 - NO PAIN

## 2025-05-31 NOTE — ED PROVIDER NOTES
HPI   Chief Complaint   Patient presents with    Insect Bite     Spider bite       History of present illness: 74-year-old female has concern of a spider bite.  Patient states that she saw a black spider about the size of a quarter crawling on her leg and then felt a bite on her leg.  Patient states that she has some discomfort and itching on the leg.  Patient notes slight swelling around the area.  Denies chest pain shortness of breath lightheadedness or dizziness.  Denies any other colors or symbols or patterns on the spider.    Review of systems: Constitutional, eye, ENT, cardiovascular, respiratory, gastrointestinal, genitourinary, neurologic, musculoskeletal, dermatologic, hematologic, endocrine systems were evaluated and were negative unless otherwise specified in history of present illness.    Medications: Reviewed and per nursing note.    Family history: Denies relevant medical conditions.    Social history: Denies tobacco, alcohol, drug use.      Physical exam:    Appearance: Well-developed, well-nourished, nontoxic-appearing, alert and oriented x3. Talking in complete sentences.    HEENT:  Head normocephalic atraumatic,    NECK:  Nml Inspection    Respiratory: Clear to auscultation    Cardiovascular: Regular rate and rhythm. Capillary refill less than 3 seconds on all extremities.    Neuro:  Oriented x 3, Speech Clear, cranial nerves grossly intact. Normal sensation to light touch in all 4 extremities. Deep tendon reflexes 2+ symmetrically in upper and lower extremities.    Musculoskeletal: Patient spontaneously moves all 4 extremities with 5/5 muscle strength.    Skin: 2 punctate lesions on right shin anteriorly with slight tenderness with minimal erythema and no induration.  No streaking crepitus or discharge.            Patient History   Medical History[1]  Surgical History[2]  Family History[3]  Social History[4]    Physical Exam   ED Triage Vitals [05/31/25 1535]   Temperature Heart Rate Respirations  BP   36.7 °C (98 °F) 68 20 130/83      Pulse Ox Temp src Heart Rate Source Patient Position   94 % -- -- --      BP Location FiO2 (%)     -- --       Physical Exam      ED Course & MDM   Diagnoses as of 05/31/25 1818   Nonvenomous spider bite of right lower leg, initial encounter                 No data recorded     [place] Coma Scale Score: 15 (05/31/25 1537 : Anna Kan RN)                           Medical Decision Making  74-year-old female presents with concern of spider bite.  Differential diagnosis of spider bite, abscess, cellulitis, potential spider that could be will spider, brown recluse, black .  There is no pattern in the spider.  Black  is not indigenous to this area.  This does not look like it is ulcerating and was not described like a brown recluse.  This sounds like a hurtado spider.  Patient did not clean the area.  This was cleansed with alcohol swab and topical antibiotic was placed.  There is no clear abscess or cellulitis at this time.  Antibiotic topical will be placed for prevention of secondary infection.    Patient will be discharged to home with prescription for topical antibiotic mupirocin.  Patient is educated in signs and symptoms of worsening symptoms and reasons to come back to the emergency department.  Will need to follow up with primary care provider.  Patient does not report social determinants of health impacting ability to obtain care that is needed.  Patient agrees with plan.    This is a transcription.  Text was reviewed for errors, but some transcription errors may remain.  Please call for any questions.          Procedure  Procedures       [1]   Past Medical History:  Diagnosis Date    Adenocarcinoma, lung (Multi)     Atrial fibrillation (Multi)     CHF (congestive heart failure)     Chronic diastolic (congestive) heart failure     CLL (chronic lymphocytic leukemia) (Multi)     COPD (chronic obstructive pulmonary disease) (Multi)     Hemorrhage of anus and rectum  09/19/2016    Rectal bleeding    Hemorrhage of anus and rectum 04/10/2017    Rectal bleeding    Hyperlipidemia     Neuropathy     Other specified disorders of gingiva and edentulous alveolar ridge 04/10/2017    Pain in gums    Pain in left foot 03/22/2017    Left foot pain    Personal history of (healed) traumatic fracture     History of fracture of nasal bone    Personal history of leukemia     History of leukemia    Personal history of Methicillin resistant Staphylococcus aureus infection     History of methicillin resistant Staphylococcus aureus infection    Personal history of other diseases of the circulatory system     History of atrial fibrillation    Personal history of other diseases of the circulatory system     History of hypertension    Personal history of other diseases of the digestive system     History of irritable bowel syndrome    Personal history of other diseases of the digestive system     History of constipation    Personal history of other diseases of the musculoskeletal system and connective tissue     History of rheumatoid arthritis    Personal history of other diseases of the musculoskeletal system and connective tissue     History of chronic back pain    Personal history of other diseases of the musculoskeletal system and connective tissue 06/17/2019    History of arthritis    Personal history of other diseases of the nervous system and sense organs     History of glaucoma    Personal history of other diseases of the respiratory system     History of chronic obstructive lung disease    Personal history of other diseases of the respiratory system     H/O emphysema    Personal history of other diseases of the respiratory system     History of asthma    Personal history of other endocrine, nutritional and metabolic disease     History of hyperlipidemia    Personal history of other endocrine, nutritional and metabolic disease     History of hyperthyroidism    Personal history of other  endocrine, nutritional and metabolic disease     History of type 2 diabetes mellitus    Unspecified injury of right ankle, initial encounter 04/10/2017    Right ankle injury   [2]   Past Surgical History:  Procedure Laterality Date    CT GUIDED PERCUTANEOUS BIOPSY LUNG  2023    CT GUIDED PERCUTANEOUS BIOPSY LUNG 2023 [facility] CT    GALLBLADDER SURGERY  2016    Gallbladder Surgery    HIP SURGERY  2016    Hip Surgery    KNEE SURGERY  2016    Knee Surgery    OTHER SURGICAL HISTORY  2019    Throat surgery    OTHER SURGICAL HISTORY  2019    Surgery    OTHER SURGICAL HISTORY  2019    Iridotomy peripheral  laser    OTHER SURGICAL HISTORY  2019    Jaw surgery    OTHER SURGICAL HISTORY  2019    Cholecystectomy    OTHER SURGICAL HISTORY  2019    Hysterectomy   [3]   Family History  Problem Relation Name Age of Onset    Cancer Mother      Drug abuse Mother      Mental illness Mother      No Known Problems Father      Cancer Sister     [4]   Social History  Tobacco Use    Smoking status: Former     Current packs/day: 0.00     Average packs/day: 1 pack/day for 40.0 years (40.0 ttl pk-yrs)     Types: Cigarettes     Start date: 1975     Quit date: 2015     Years since quittin.9     Passive exposure: Past    Smokeless tobacco: Never   Vaping Use    Vaping status: Never Used   Substance Use Topics    Alcohol use: Not Currently    Drug use: Yes     Types: Marijuana     Comment: occasional - has medical marijuana card        Abdi Lowry PA-C  [phone]

## 2025-05-31 NOTE — ED TRIAGE NOTES
Pt presented to the ED with c/o spider bite. Pt states that she was bitten last night by a spider. Pts right leg is red more than the other.

## 2025-06-02 ENCOUNTER — OFFICE VISIT (OUTPATIENT)
Dept: HEMATOLOGY/ONCOLOGY | Facility: CLINIC | Age: 75
End: 2025-06-02
Payer: MEDICARE

## 2025-06-02 VITALS
SYSTOLIC BLOOD PRESSURE: 109 MMHG | WEIGHT: 185.52 LBS | BODY MASS INDEX: 37.4 KG/M2 | HEIGHT: 59 IN | OXYGEN SATURATION: 98 % | DIASTOLIC BLOOD PRESSURE: 50 MMHG | TEMPERATURE: 97.7 F | HEART RATE: 61 BPM | RESPIRATION RATE: 16 BRPM

## 2025-06-02 DIAGNOSIS — C34.90 ADENOCARCINOMA OF LUNG, UNSPECIFIED LATERALITY (MULTI): ICD-10-CM

## 2025-06-02 DIAGNOSIS — C91.10 CLL (CHRONIC LYMPHOCYTIC LEUKEMIA) (MULTI): ICD-10-CM

## 2025-06-02 DIAGNOSIS — R91.8 MULTIPLE PULMONARY NODULES: ICD-10-CM

## 2025-06-02 LAB
ALBUMIN SERPL BCP-MCNC: 3.7 G/DL (ref 3.4–5)
ALP SERPL-CCNC: 61 U/L (ref 33–136)
ALT SERPL W P-5'-P-CCNC: 10 U/L (ref 7–45)
ANION GAP SERPL CALC-SCNC: 10 MMOL/L (ref 10–20)
AST SERPL W P-5'-P-CCNC: 13 U/L (ref 9–39)
BASOPHILS # BLD MANUAL: 0.16 X10*3/UL (ref 0–0.1)
BASOPHILS NFR BLD MANUAL: 1 %
BILIRUB SERPL-MCNC: 0.4 MG/DL (ref 0–1.2)
BUN SERPL-MCNC: 15 MG/DL (ref 6–23)
CALCIUM SERPL-MCNC: 9 MG/DL (ref 8.6–10.3)
CHLORIDE SERPL-SCNC: 100 MMOL/L (ref 98–107)
CO2 SERPL-SCNC: 29 MMOL/L (ref 21–32)
CREAT SERPL-MCNC: 0.96 MG/DL (ref 0.5–1.05)
EGFRCR SERPLBLD CKD-EPI 2021: 63 ML/MIN/1.73M*2
EOSINOPHIL # BLD MANUAL: 0.16 X10*3/UL (ref 0–0.4)
EOSINOPHIL NFR BLD MANUAL: 1 %
ERYTHROCYTE [DISTWIDTH] IN BLOOD BY AUTOMATED COUNT: 15.1 % (ref 11.5–14.5)
FERRITIN SERPL-MCNC: 78 NG/ML (ref 8–150)
GLUCOSE SERPL-MCNC: 122 MG/DL (ref 74–99)
HCT VFR BLD AUTO: 34.4 % (ref 36–46)
HGB BLD-MCNC: 10.5 G/DL (ref 12–16)
IMM GRANULOCYTES # BLD AUTO: 0.04 X10*3/UL (ref 0–0.5)
IMM GRANULOCYTES NFR BLD AUTO: 0.3 % (ref 0–0.9)
IRON SATN MFR SERPL: 21 % (ref 25–45)
IRON SERPL-MCNC: 56 UG/DL (ref 35–150)
LDH SERPL L TO P-CCNC: 139 U/L (ref 84–246)
LYMPHOCYTES # BLD MANUAL: 11.29 X10*3/UL (ref 0.8–3)
LYMPHOCYTES NFR BLD MANUAL: 71 %
MCH RBC QN AUTO: 33.3 PG (ref 26–34)
MCHC RBC AUTO-ENTMCNC: 30.5 G/DL (ref 32–36)
MCV RBC AUTO: 109 FL (ref 80–100)
MONOCYTES # BLD MANUAL: 0.95 X10*3/UL (ref 0.05–0.8)
MONOCYTES NFR BLD MANUAL: 6 %
NEUTS SEG # BLD MANUAL: 3.34 X10*3/UL (ref 1.6–5)
NEUTS SEG NFR BLD MANUAL: 21 %
NRBC BLD-RTO: ABNORMAL /100{WBCS}
PLATELET # BLD AUTO: 212 X10*3/UL (ref 150–450)
POTASSIUM SERPL-SCNC: 4.4 MMOL/L (ref 3.5–5.3)
PROT SERPL-MCNC: 6.5 G/DL (ref 6.4–8.2)
RBC # BLD AUTO: 3.15 X10*6/UL (ref 4–5.2)
RBC MORPH BLD: ABNORMAL
SODIUM SERPL-SCNC: 135 MMOL/L (ref 136–145)
TIBC SERPL-MCNC: 269 UG/DL (ref 240–445)
TOTAL CELLS COUNTED BLD: 100
UIBC SERPL-MCNC: 213 UG/DL (ref 110–370)
VIT B12 SERPL-MCNC: >2000 PG/ML (ref 211–911)
WBC # BLD AUTO: 15.9 X10*3/UL (ref 4.4–11.3)

## 2025-06-02 PROCEDURE — 1126F AMNT PAIN NOTED NONE PRSNT: CPT | Performed by: INTERNAL MEDICINE

## 2025-06-02 PROCEDURE — 3074F SYST BP LT 130 MM HG: CPT | Performed by: INTERNAL MEDICINE

## 2025-06-02 PROCEDURE — 99215 OFFICE O/P EST HI 40 MIN: CPT | Performed by: INTERNAL MEDICINE

## 2025-06-02 PROCEDURE — 85027 COMPLETE CBC AUTOMATED: CPT | Performed by: INTERNAL MEDICINE

## 2025-06-02 PROCEDURE — 36415 COLL VENOUS BLD VENIPUNCTURE: CPT | Performed by: INTERNAL MEDICINE

## 2025-06-02 PROCEDURE — 3008F BODY MASS INDEX DOCD: CPT | Performed by: INTERNAL MEDICINE

## 2025-06-02 PROCEDURE — 80053 COMPREHEN METABOLIC PANEL: CPT | Performed by: INTERNAL MEDICINE

## 2025-06-02 PROCEDURE — 85007 BL SMEAR W/DIFF WBC COUNT: CPT | Performed by: INTERNAL MEDICINE

## 2025-06-02 PROCEDURE — 82728 ASSAY OF FERRITIN: CPT | Performed by: INTERNAL MEDICINE

## 2025-06-02 PROCEDURE — 3078F DIAST BP <80 MM HG: CPT | Performed by: INTERNAL MEDICINE

## 2025-06-02 PROCEDURE — 83550 IRON BINDING TEST: CPT | Performed by: INTERNAL MEDICINE

## 2025-06-02 PROCEDURE — 1159F MED LIST DOCD IN RCRD: CPT | Performed by: INTERNAL MEDICINE

## 2025-06-02 PROCEDURE — 1160F RVW MEDS BY RX/DR IN RCRD: CPT | Performed by: INTERNAL MEDICINE

## 2025-06-02 PROCEDURE — 83615 LACTATE (LD) (LDH) ENZYME: CPT | Performed by: INTERNAL MEDICINE

## 2025-06-02 PROCEDURE — 82607 VITAMIN B-12: CPT | Mod: PORLAB | Performed by: INTERNAL MEDICINE

## 2025-06-02 ASSESSMENT — PAIN SCALES - GENERAL: PAINLEVEL_OUTOF10: 0-NO PAIN

## 2025-06-02 NOTE — PROGRESS NOTES
Patient ID: Alysia Magdaleno is a 74 y.o. female.  Referring Physician: Chelsey Alaniz PA-C  [id number] [place] [address]  Primary Care Provider: Xi Dwyer MD    Oncology /hematology diagnosis   #1 stage IA3 [id number] left lower lobe adenocarcinoma s/p definitive SBRT 55 Gy/5 fractions completed 9/20/23.       #2 CLL, diagnosed in 2015, on surveillance      Subjective  /interval history  6/2/25  This is a 74-year-old female with history of CLL diagnosed in 2015, no treatment that the patient had diagnosis adenocarcinoma left lower lobe status post SBRT completed on September 28, 2023.  Patient has been followed clinically.  Initially patient was seen by Dr. Vanessa Ramírez  Patient has a history of left lower lobe adenocarcinoma s/p radiotherapy, left upper lobe density which has been followed clinically and right upper lobe nodule measuring 7 mm.  Patient has chronic shortness of breath no hemoptysis no chest pain, not very active      HPI    73 year old woman with hx of CLL diagnosed since 2015 on surveillance and has not required treatment, smoking, COPD on oxygen, achalasia  by esophagogram, Afib, HTN, obesity, CHF, depression, DM with neuropathy, GERD, HL, IBS, OA, Rheumatoid arthritis who is referred for recently diagnosed lung adenocarcinoma.   The patient was followed with serial low dose CT scans Lung cancer screening   8/9/22 CT scan low dose showed a LIRADS 4b lesion suspicious in the LLL GGO possibly neoplastic vs inflammatory /infectious , and stable superior segment of LLL GGO, a 3 months follow up scan was recommended   1/14/23 CT low dose, interval increase in mixed solid and GGO in apical segment of the LLL measuring 2.3 x 1 cm increasing from 1.1 cm, interval development of a solid component measuring 0.7 cm, PET recommended, new 1 x 0.5 cm LLL opacity , additional  GGO   2/3/23 PET scan : GGO in the apical segment of LLL and CHELY are not FDG avid, low grade neoplastic process vs  inflammatory   5/3/23 CT chest low dose: LIRADS 4X,S (very suspicious) lesion increased density of the apical segment LLL density, measured 2.6 cm from 2.3 cm and increased solid component measured 0.9 cm from 0.7 cm , CHELY GG density measuring up to 1.6 cm stable from  immediate prior but increased from previous could represent adenocarcinoma in situ, subsolid density measuring 1.1 cm in Right lung base, likely inflammatory   Referred to thoracic surgery   23: biopsy of LLL solid component of the density: invasive well differentiated adenoca, PDL1 < 1%, NGS showed KRAS G12C mutation   PFTs completed FEV1 81% and DLCO 44%, uses O2, deemed not a surgical candidate and referred to rad onc and med onc   Patient had CT scan in 2024 that showed around 5 cm opacity in the CHELY   This are of mass like consolidation had increased size and fDG avidity on PET scan of 2024   She then had CT guided biopsy 2024 that showed adenocarcinoma   Case discussed on TB with radiation oncology , recommended for another surveillance scan, the carcinoma area thought to be scant in the background of necrosis     Other Labs:   Chronic leukocytosis with lymphocytes predominance flow cytometry from  showed population CD5+CD23+ CD10- consistent with CLL, FISH, IGHV mutation status and NGS lymphoid not ordered   Wbc stable 20-30K , hg from  10.2 g/dl, ferritin of 27 and Tsat of 9%       PAST MEDICAL HISTORY:   CLL diagnosed since 2015 on surveillance and has not required treatment, smoking, COPD on oxygen, achalasia by esophagogram, Afib, HTN, obesity, CHF, depression, DM with neuropathy, GERD,  HL, IBS, OA, Rheumatoid/psoriatic arthritis on MTX      SOCIAL HISTORY:   quit smoking 7 years ago, smoked 1 ppd x 40 yrs   no alcohol   has one daughter and one granddaughter   she worked in a factory/ machine equipment      FAMILY HISTORY:    mother  of colon cancer, sister had thyroid cancer   No other specific history of  "bleeding, clotting or malignant disorder in the family.    Family History[1]  Oncology History   Adenocarcinoma of lung (Multi)   7/19/2023 Cancer Staged    Staging form: Lung, AJCC 8th Edition, Clinical stage from 7/19/2023: Stage IA3 (cT1c, cN0, cM0) - Signed by Bakari PARR MD on 12/13/2023 9/7/2023 Initial Diagnosis    Adenocarcinoma, lung (CMS/HCC)         Alysia Magdaleno  reports that she quit smoking about 9 years ago. Her smoking use included cigarettes. She started smoking about 49 years ago. She has a 40 pack-year smoking history. She has been exposed to tobacco smoke. She has never used smokeless tobacco.  She  reports that she does not currently use alcohol.  She  reports current drug use. Drug: Marijuana.    Review of Systems - Oncology   Shortness of breath, some arthritis no hemoptysis no chest pain, other review of systems unremarkable  Objective   BSA: 1.87 meters squared  /50 (BP Location: Left arm, Patient Position: Sitting)   Pulse 61   Temp 36.5 °C (97.7 °F)   Resp 16   Ht 1.499 m (4' 11.02\")   Wt 84.1 kg (185 lb 8.3 oz)   SpO2 98%   BMI 37.45 kg/m²      Physical Exam  GENERAL:  Age-appropriate, in no acute discomfort.    VITAL SIGNS:  Reviewed in the EMR    HEENT:  Normocephalic and atraumatic.  Mucous membranes are moist. No oral lesions.    NECK:  Supple without lymphadenopathy.  No thyromegaly or bruits.    CHEST: bilaterally decreased breath sounds , area of tenderness on her right ribs   HEART:  Regular bradycardic   ABDOMEN:  Soft, nontender, and nondistended. No hepatosplenomegaly.    NEUROLOGICAL:  Alert, awake, and oriented.   LYMPHATICS: No significant lymphadenopathy.  Extremities some redness of the right lower extremity due to insect bite  Performance Status: ECOG 2   Labs  15.9 High  P 15.7 High  13.7 High  19.9 High  20.0 High  17.0 High  23.2 High     nRBC P 0.0 R 0.0 R 0.0 R 0.0 R 0.0 R CM   Comment: Not Measured   RBC  4.00 - 5.20 x10*6/uL 3.15 Low  P 4.04 " 4.41 3.82 Low  3.48 Low  3.35 Low  3.61 Low    Hemoglobin  12.0 - 16.0 g/dL 10.5 Low  P 12.6 13.9 13.0 11.8 Low  11.4 Low  12.3   Hematocrit  36.0 - 46.0 % 34.4 Low  P 41.0 44.3 40.9 37.7 37.1 41.2   MCV  80 - 100 fL 109 High  P 102 High  101 High  107 High  108 High  111 High  114 High    MCH  26.0 - 34.0 pg 33.3 P 31.2 31.5 34.0 33.9 34.0 34.1 High    MCHC  32.0 - 36.0 g/dL 30.5 Low  P 30.7 Low  31.4 Low  31.8 Low  31.3 Low  30.7 Low  29.9 Low    RDW  11.5 - 14.5 % 15.1 High  P 13.8 13.6 12.7 12.7 13.8 14.4   Platelets  150 - 450 x10*3/uL 212 P           Assessment/Plan      #1 stage IA3 [id number] left lower lobe adenocarcinoma s/p definitive SBRT 55 Gy/5 fractions completed 9/20/23.     #2 left upper lobe nodularity    3.  The right upper lobe nodule measuring 7 mm    4.  CLL diagnosed 2015 stable  Plan, clinically stable, CLL stable, schedule for PET scan follow-up after 2-month     Plan discussed with patient  Amanda Mayer MD                              [1]   Family History  Problem Relation Name Age of Onset    Cancer Mother      Drug abuse Mother      Mental illness Mother      No Known Problems Father      Cancer Sister

## 2025-06-02 NOTE — PATIENT INSTRUCTIONS
Follow up visit for history of CLL and lung cancer.     PET scan in 4 weeks. Follow up with Dr. Mayer after PET scan to review results.   Call the office at [phone] with questions or needs.  You may also contact your nurse or doctor with non-urgent issues by sending a "[employer]hart message.  Reminders  Medicine refills: call or send a [employer] message to request medicine refills at least 5 to 7 days before you run out.  Labs: You will need labs drawn at your follow-up doctor visit

## 2025-06-16 DIAGNOSIS — K21.9 GERD WITHOUT ESOPHAGITIS: ICD-10-CM

## 2025-06-16 DIAGNOSIS — J44.9 CHRONIC OBSTRUCTIVE PULMONARY DISEASE, UNSPECIFIED COPD TYPE (MULTI): ICD-10-CM

## 2025-06-16 DIAGNOSIS — G47.00 INSOMNIA, UNSPECIFIED TYPE: ICD-10-CM

## 2025-06-16 DIAGNOSIS — L40.50 PSORIASIS WITH ARTHROPATHY (MULTI): ICD-10-CM

## 2025-06-16 RX ORDER — TRAZODONE HYDROCHLORIDE 50 MG/1
50 TABLET ORAL NIGHTLY PRN
Qty: 90 TABLET | Refills: 0 | Status: SHIPPED | OUTPATIENT
Start: 2025-06-16

## 2025-06-16 RX ORDER — ROFLUMILAST 500 UG/1
500 TABLET ORAL DAILY
Qty: 90 TABLET | Refills: 0 | Status: SHIPPED | OUTPATIENT
Start: 2025-06-16 | End: 2025-09-14

## 2025-06-16 RX ORDER — METHOTREXATE 2.5 MG/1
15 TABLET ORAL WEEKLY
Qty: 72 TABLET | Refills: 3 | Status: SHIPPED | OUTPATIENT
Start: 2025-06-16 | End: 2026-06-16

## 2025-06-18 ENCOUNTER — TELEPHONE (OUTPATIENT)
Facility: CLINIC | Age: 75
End: 2025-06-18
Payer: MEDICARE

## 2025-06-18 DIAGNOSIS — M05.79 RHEUMATOID ARTHRITIS INVOLVING MULTIPLE SITES WITH POSITIVE RHEUMATOID FACTOR (MULTI): ICD-10-CM

## 2025-06-18 DIAGNOSIS — I48.91 ATRIAL FIBRILLATION BY ELECTROCARDIOGRAM (MULTI): ICD-10-CM

## 2025-06-18 RX ORDER — SULFASALAZINE 500 MG/1
500 TABLET ORAL 2 TIMES DAILY
Qty: 180 TABLET | Refills: 3 | Status: SHIPPED | OUTPATIENT
Start: 2025-06-18

## 2025-06-18 NOTE — TELEPHONE ENCOUNTER
Requested Prescriptions     Pending Prescriptions Disp Refills    sulfaSALAzine (Azulfidine) 500 mg tablet [Pharmacy Med Name: SULFASALAZINE 500MG TABLETS] 180 tablet 0     Sig: TAKE 1 TABLET(500 MG) BY MOUTH TWICE DAILY     Lov 6/6/24    Nov 8/7/25

## 2025-06-25 RX ORDER — DEXLANSOPRAZOLE 60 MG/1
CAPSULE, DELAYED RELEASE ORAL
Qty: 90 CAPSULE | Refills: 3 | Status: SHIPPED | OUTPATIENT
Start: 2025-06-25

## 2025-07-02 ENCOUNTER — APPOINTMENT (OUTPATIENT)
Facility: CLINIC | Age: 75
End: 2025-07-02
Payer: MEDICARE

## 2025-07-02 VITALS
BODY MASS INDEX: 36.29 KG/M2 | WEIGHT: 180 LBS | DIASTOLIC BLOOD PRESSURE: 52 MMHG | SYSTOLIC BLOOD PRESSURE: 122 MMHG | OXYGEN SATURATION: 93 % | HEIGHT: 59 IN | HEART RATE: 76 BPM

## 2025-07-02 DIAGNOSIS — D12.3 ADENOMATOUS POLYP OF TRANSVERSE COLON: Primary | ICD-10-CM

## 2025-07-02 PROBLEM — K62.5 HEMORRHAGE OF ANUS AND RECTUM: Status: RESOLVED | Noted: 2024-06-22 | Resolved: 2025-07-02

## 2025-07-02 PROBLEM — K58.0 IRRITABLE BOWEL SYNDROME WITH DIARRHEA: Status: RESOLVED | Noted: 2023-04-19 | Resolved: 2025-07-02

## 2025-07-02 PROBLEM — Z87.19 PERSONAL HISTORY OF OTHER DISEASES OF THE DIGESTIVE SYSTEM: Status: RESOLVED | Noted: 2024-06-22 | Resolved: 2025-07-02

## 2025-07-02 PROCEDURE — 3074F SYST BP LT 130 MM HG: CPT | Performed by: PHYSICIAN ASSISTANT

## 2025-07-02 PROCEDURE — 3078F DIAST BP <80 MM HG: CPT | Performed by: PHYSICIAN ASSISTANT

## 2025-07-02 PROCEDURE — 1160F RVW MEDS BY RX/DR IN RCRD: CPT | Performed by: PHYSICIAN ASSISTANT

## 2025-07-02 PROCEDURE — 1159F MED LIST DOCD IN RCRD: CPT | Performed by: PHYSICIAN ASSISTANT

## 2025-07-02 PROCEDURE — 1036F TOBACCO NON-USER: CPT | Performed by: PHYSICIAN ASSISTANT

## 2025-07-02 PROCEDURE — 3008F BODY MASS INDEX DOCD: CPT | Performed by: PHYSICIAN ASSISTANT

## 2025-07-02 PROCEDURE — 99214 OFFICE O/P EST MOD 30 MIN: CPT | Performed by: PHYSICIAN ASSISTANT

## 2025-07-02 RX ORDER — POLYETHYLENE GLYCOL 3350, SODIUM SULFATE ANHYDROUS, SODIUM BICARBONATE, SODIUM CHLORIDE, POTASSIUM CHLORIDE 236; 22.74; 6.74; 5.86; 2.97 G/4L; G/4L; G/4L; G/4L; G/4L
4 POWDER, FOR SOLUTION ORAL ONCE
Qty: 4000 ML | Refills: 0 | Status: SHIPPED | OUTPATIENT
Start: 2025-07-02 | End: 2025-07-02

## 2025-07-02 NOTE — PATIENT INSTRUCTIONS
Thank you for coming in regarding a colonoscopy. Because of your history of polyps, we recommend a screening colonoscopy for surveillance. Please let us know if you have any questions about the preparation or procedure. Your laxative has been sent to your pharmacy. Call [phone] with questions or concerns.    You will need to hold your Eliquis 2-3 days

## 2025-07-02 NOTE — ASSESSMENT & PLAN NOTE
Patient with one TA removed in 2022. Repeat colonoscopy was recommended in 3-5 years. She has been doing well overall. Colonoscopy in OR with golytely prep recommended. We discussed that she will need to hold Eliquis 2-3 days. GFR normal recently.

## 2025-07-02 NOTE — PROGRESS NOTES
Subjective   Patient ID: Alysia Magdaleno is a 74 y.o. female who presents for Colonoscopy (OV due to Eliquis ).    Patient's PCP is Dr. Xi Dwyer    PMH: CLL, atrial fibrillation on Eliquis, HTN, CHF, COPD, HLD, RAD, IBS-mixed     HPI  Patient was last seen by myself on 3/9/2023 for IBS. Patient states that she is doing well overall. She states that she stopped Linzess due to concerns of diarrhea. She is currently having 1 BM daily. She states that it is hard at time to push the stool out. She states that she has small amounts of red blood with wiping. She has lost some weight recently. She states that occasionally she will have some upper abdominal pain, but nothing different or more severe than normal. She continues on dexilant and famotidine for chronic heartburn with good control. She denies melena or hematochezia, change in bowel habits, dysphagia to solids/liquids.    Family History: Parent with colon cancer in late 70s.    Prior GI evaluation:  Esophagram 8/2020: mild impression of the cricophayngeal muscle into the esophagus and few distal esophageal tertiary contractions    Colonoscopy 8/2022: 2 colonic polyps (1 TA and 1 hyperplastic). Repeat colonoscopy recommended in 3-5 years. Diverticulosis seen.    Review of Systems:  Constitutional: Weight is down 5 pounds since June.  GI: +difficulty pushing out stool, occasional upper abdominal pain    Medications:  Prior to Admission medications    Medication Sig Start Date End Date Taking? Authorizing Provider   albuterol (ProAir HFA) 90 mcg/actuation inhaler Inhale 2 puffs 4 times a day. 9/5/16   Historical Provider, MD   apixaban (Eliquis) 5 mg tablet TAKE 1 TABLET BY MOUTH TWICE DAILY 12/17/24   TERESE White-CNP   atorvastatin (Lipitor) 20 mg tablet Take 1 tablet (20 mg) by mouth once daily at bedtime. 2/6/24 9/5/24  TERESE Ward-CNP   cloNIDine (Catapres) 0.1 mg tablet TAKE 1 TABLET(0.1 MG) BY MOUTH EVERY 8 HOURS 1/15/25   Xi PARSONS  MD Reymundo   dexlansoprazole (Dexilant) 60 mg DR capsule take 1 capsule by mouth every morning TAKE BEFORE MEALS 6/25/25   Xi Dwyer MD   DULoxetine (Cymbalta) 60 mg DR capsule Take 1 capsule (60 mg) by mouth once daily. Do not crush or chew. 9/9/24   Xi Dwyer MD   famotidine (Pepcid) 40 mg tablet take 1 tablet by mouth once daily 6/19/23   Xi Dwyer MD   fluticasone-umeclidin-vilanter (Trelegy Ellipta) 200-62.5-25 mcg blister with device INHALE 1 PUFF EVERY MORNING 1/17/25   Xi Dwyer MD   folic acid (Folvite) 1 mg tablet Take 1 tablet (1 mg) by mouth once daily. 1/13/25 4/13/25  Ted Rivero MD   gabapentin (Neurontin) 300 mg capsule Take 1 capsule (300 mg) by mouth 3 times a day. 3/25/25   Xi Dwyer MD   loratadine 10 mg capsule Take by mouth.    Chuy Ayers MD   methotrexate (Trexall) 2.5 mg tablet Take 6 tablets (15 mg total) by mouth 1 (one) time per week. 6/16/25 6/16/26  Xi Dwyer MD   metoprolol succinate XL (Toprol-XL) 50 mg 24 hr tablet Take 1 tablet (50 mg) by mouth once daily. Do not crush or chew. 10/3/24 10/3/25  TERESE Ward-CNP   propafenone (Rythmol) 225 mg tablet TAKE 1 TABLET(225 MG) BY MOUTH EVERY 8 HOURS 3/26/25   TERESE Freitas-CNP   roflumilast (Daliresp) 500 mcg tablet Take 1 tablet (500 mcg) by mouth once daily. 6/16/25 9/14/25  Xi Dwyer MD   sulfaSALAzine (Azulfidine) 500 mg tablet TAKE 1 TABLET(500 MG) BY MOUTH TWICE DAILY 6/18/25   Xi Dwyer MD   traZODone (Desyrel) 50 mg tablet Take 1 tablet (50 mg) by mouth as needed at bedtime for sleep. 6/16/25   Xi Dwyer MD       Allergies:  Adhesive tape-silicones, Aspirin, Hydrocodone-acetaminophen, Morphine, Naproxen, Niacin, Nsaids (non-steroidal anti-inflammatory drug), Opioids-meperidine and related, Tizanidine, and Other    Objective   Physical exam:  Constitutional: Well developed, well nourished 74 y.o. year old in no  acute distress. Ambulates with walker.  Skin: Warm and dry. Normal turgor. No rash, ulcer, trauma, jaundice.   Eyes: Pupils symmetric and reactive to light.  Respiratory: Clear to auscultation bilaterally. No wheezes, rhonchi, or rales heard.  Cardiovascular: Regular rate and rhythm. S1 and S2 appreciated and normal. No murmur, rub, or gallop heard.   Edema: No edema noted.  GI: Normal bowel sounds. Soft, non-distended, non-tender. No rebound or guarding present. No hepatomegaly or splenomegaly appreciated. Abdominal aorta not palpably abnormal.  Musculoskeletal: Limbs and Joints grossly normal. Full range of motion in major joint.   Neuro: Alert and oriented x 3. Cranial nerves 2-12 grossly intact.   Psych: Normal mood and affect.        Relevant Results Recent labs reviewed in the EMR.    Radiology: Reviewed imaging reviewed in the EMR.  Imaging  No results found.    Cardiology, Vascular, and Other Imaging  No other imaging results found for the past 7 days      Assessment/Plan   Problem List Items Addressed This Visit           ICD-10-CM    Polyp of colon - Primary K63.5    Patient with one TA removed in 2022. Repeat colonoscopy was recommended in 3-5 years. She has been doing well overall. Colonoscopy in OR with golytely prep recommended. We discussed that she will need to hold Eliquis 2-3 days. GFR normal recently.         Relevant Medications    polyethylene glycol (Golytely) 236-22.74-6.74 -5.86 gram solution    Other Relevant Orders    Colonoscopy Screening; High Risk Patient   We discussed pelvic floor PT referral for issues with defecation, patient declines at this time.    Meds to hold: Eliquis x 2-3 days.  Kiera Young PA-C

## 2025-07-03 ENCOUNTER — HOSPITAL ENCOUNTER (OUTPATIENT)
Dept: RADIOLOGY | Facility: HOSPITAL | Age: 75
Discharge: HOME | End: 2025-07-03
Payer: MEDICARE

## 2025-07-03 DIAGNOSIS — C34.90 ADENOCARCINOMA OF LUNG, UNSPECIFIED LATERALITY (MULTI): ICD-10-CM

## 2025-07-03 DIAGNOSIS — R91.8 MULTIPLE PULMONARY NODULES: ICD-10-CM

## 2025-07-03 DIAGNOSIS — C91.10 CLL (CHRONIC LYMPHOCYTIC LEUKEMIA) (MULTI): ICD-10-CM

## 2025-07-03 PROCEDURE — 78815 PET IMAGE W/CT SKULL-THIGH: CPT | Mod: PI

## 2025-07-03 PROCEDURE — A9552 F18 FDG: HCPCS | Performed by: INTERNAL MEDICINE

## 2025-07-03 PROCEDURE — 3430000001 HC RX 343 DIAGNOSTIC RADIOPHARMACEUTICALS: Performed by: INTERNAL MEDICINE

## 2025-07-03 RX ORDER — FLUDEOXYGLUCOSE F 18 200 MCI/ML
12.9 INJECTION, SOLUTION INTRAVENOUS
Status: COMPLETED | OUTPATIENT
Start: 2025-07-03 | End: 2025-07-03

## 2025-07-03 RX ADMIN — FLUDEOXYGLUCOSE F 18 12.9 MILLICURIE: 200 INJECTION, SOLUTION INTRAVENOUS at 13:07

## 2025-07-11 ENCOUNTER — TELEPHONE (OUTPATIENT)
Dept: HEMATOLOGY/ONCOLOGY | Facility: CLINIC | Age: 75
End: 2025-07-11
Payer: MEDICARE

## 2025-07-11 ENCOUNTER — TELEPHONE (OUTPATIENT)
Dept: CARDIOLOGY | Facility: HOSPITAL | Age: 75
End: 2025-07-11
Payer: MEDICARE

## 2025-07-11 NOTE — TELEPHONE ENCOUNTER
Yajaira RN informed me we received refill request for pt but she will need to r/s missed follow up appts with Dr. Rose and Zaira SNIDER in order to get further refills. LVM for pt to please call the office to schedule these visits.

## 2025-07-28 RX ORDER — PROPAFENONE HYDROCHLORIDE 225 MG/1
225 TABLET, COATED ORAL EVERY 8 HOURS
Qty: 270 TABLET | Refills: 0 | Status: SHIPPED | OUTPATIENT
Start: 2025-07-28

## 2025-07-28 NOTE — TELEPHONE ENCOUNTER
7/28 - Was able to reach pt sister Samara and she assisted in scheduling overdue follow up appts for pt. Dr. Adan scheduled 8/22 1:30pm and Zaira SNIDER scheduled 9/22 1pm.

## 2025-07-31 ENCOUNTER — OFFICE VISIT (OUTPATIENT)
Dept: HEMATOLOGY/ONCOLOGY | Facility: CLINIC | Age: 75
End: 2025-07-31
Payer: MEDICARE

## 2025-07-31 VITALS
RESPIRATION RATE: 16 BRPM | HEIGHT: 60 IN | OXYGEN SATURATION: 93 % | TEMPERATURE: 97.9 F | WEIGHT: 176.26 LBS | SYSTOLIC BLOOD PRESSURE: 88 MMHG | DIASTOLIC BLOOD PRESSURE: 56 MMHG | BODY MASS INDEX: 34.6 KG/M2 | HEART RATE: 76 BPM

## 2025-07-31 DIAGNOSIS — R91.8 MULTIPLE PULMONARY NODULES: ICD-10-CM

## 2025-07-31 DIAGNOSIS — C34.90 ADENOCARCINOMA OF LUNG, UNSPECIFIED LATERALITY (MULTI): ICD-10-CM

## 2025-07-31 DIAGNOSIS — C91.10 CLL (CHRONIC LYMPHOCYTIC LEUKEMIA) (MULTI): ICD-10-CM

## 2025-07-31 LAB
BASOPHILS # BLD MANUAL: 0 X10*3/UL (ref 0–0.1)
BASOPHILS NFR BLD MANUAL: 0 %
EOSINOPHIL # BLD MANUAL: 0 X10*3/UL (ref 0–0.4)
EOSINOPHIL NFR BLD MANUAL: 0 %
ERYTHROCYTE [DISTWIDTH] IN BLOOD BY AUTOMATED COUNT: 13.5 % (ref 11.5–14.5)
HCT VFR BLD AUTO: 36.7 % (ref 36–46)
HGB BLD-MCNC: 11.4 G/DL (ref 12–16)
IMM GRANULOCYTES # BLD AUTO: 0.03 X10*3/UL (ref 0–0.5)
IMM GRANULOCYTES NFR BLD AUTO: 0.2 % (ref 0–0.9)
LYMPHOCYTES # BLD MANUAL: 12.21 X10*3/UL (ref 0.8–3)
LYMPHOCYTES NFR BLD MANUAL: 74 %
MCH RBC QN AUTO: 33.6 PG (ref 26–34)
MCHC RBC AUTO-ENTMCNC: 31.1 G/DL (ref 32–36)
MCV RBC AUTO: 108 FL (ref 80–100)
MONOCYTES # BLD MANUAL: 1.16 X10*3/UL (ref 0.05–0.8)
MONOCYTES NFR BLD MANUAL: 7 %
NEUTS SEG # BLD MANUAL: 3.14 X10*3/UL (ref 1.6–5)
NEUTS SEG NFR BLD MANUAL: 19 %
NRBC BLD-RTO: ABNORMAL /100{WBCS}
PLATELET # BLD AUTO: 185 X10*3/UL (ref 150–450)
RBC # BLD AUTO: 3.39 X10*6/UL (ref 4–5.2)
RBC MORPH BLD: ABNORMAL
TOTAL CELLS COUNTED BLD: 100
WBC # BLD AUTO: 16.5 X10*3/UL (ref 4.4–11.3)

## 2025-07-31 PROCEDURE — 1125F AMNT PAIN NOTED PAIN PRSNT: CPT | Performed by: INTERNAL MEDICINE

## 2025-07-31 PROCEDURE — 1159F MED LIST DOCD IN RCRD: CPT | Performed by: INTERNAL MEDICINE

## 2025-07-31 PROCEDURE — 85007 BL SMEAR W/DIFF WBC COUNT: CPT | Performed by: INTERNAL MEDICINE

## 2025-07-31 PROCEDURE — 1160F RVW MEDS BY RX/DR IN RCRD: CPT | Performed by: INTERNAL MEDICINE

## 2025-07-31 PROCEDURE — 3074F SYST BP LT 130 MM HG: CPT | Performed by: INTERNAL MEDICINE

## 2025-07-31 PROCEDURE — 3078F DIAST BP <80 MM HG: CPT | Performed by: INTERNAL MEDICINE

## 2025-07-31 PROCEDURE — 3008F BODY MASS INDEX DOCD: CPT | Performed by: INTERNAL MEDICINE

## 2025-07-31 PROCEDURE — 36415 COLL VENOUS BLD VENIPUNCTURE: CPT | Performed by: INTERNAL MEDICINE

## 2025-07-31 PROCEDURE — 99214 OFFICE O/P EST MOD 30 MIN: CPT | Performed by: INTERNAL MEDICINE

## 2025-07-31 PROCEDURE — 85027 COMPLETE CBC AUTOMATED: CPT | Performed by: INTERNAL MEDICINE

## 2025-07-31 ASSESSMENT — PAIN SCALES - GENERAL: PAINLEVEL_OUTOF10: 9

## 2025-07-31 NOTE — PATIENT INSTRUCTIONS
Follow up visit for history of CLL and lung cancer.     PET scan was good.     Return in 4 months for routine follow up with Dr. Mayer    Call the office at [phone] with questions or needs.  You may also contact your nurse or doctor with non-urgent issues by sending a [employer] message.  Reminders  Medicine refills: call or send a [employer] message to request medicine refills at least 5 to 7 days before you run out.  Labs: You will need labs drawn at your follow-up doctor visit

## 2025-07-31 NOTE — PROGRESS NOTES
Patient ID: Alysia Magdaleno is a 75 y.o. female.  Referring Physician: Amanda Mayer MD  [address] [facility], [address]  [place] [id number]  Primary Care Provider: Xi Dwyer MD    Oncology /hematology diagnosis   #1 stage IA [id number] left lower lobe adenocarcinoma s/p definitive SBRT 55 Gy/5 fractions completed 9/20/23.   7/3/25 PET scan , Evolving postradiation changes in the superior segment of left lower lobe, no PET evidence of local recurrence at the site of radiation bed. 2. Stable non FDG avid right hilar and right upper lobe pulmonary nodules. 3. No PET evidence of musa or distant metastasis 4. Innumerable non FDG avid subcentimeter lymph nodes above and below diaphragm, consistent with history of CLL        #2 CLL, diagnosed in 2015, on surveillance      Subjective  /interval history  7/31/25  This is a 74-year-old female with history of CLL diagnosed in 2015, no treatment that the patient had diagnosis adenocarcinoma left lower lobe status post SBRT completed on September 28, 2023.  Patient has been followed clinically.  Initially patient was seen by Dr. Vanessa Ramírez  Patient has a history of left lower lobe adenocarcinoma s/p radiotherapy, left upper lobe density which has been followed clinically and right upper lobe nodule measuring 7 mm.  Patient has chronic shortness of breath no hemoptysis no chest pain, not very active  PET scan result discussed with the patient    HPI    73 year old woman with hx of CLL diagnosed since 2015 on surveillance and has not required treatment, smoking, COPD on oxygen, achalasia  by esophagogram, Afib, HTN, obesity, CHF, depression, DM with neuropathy, GERD, HL, IBS, OA, Rheumatoid arthritis who is referred for recently diagnosed lung adenocarcinoma.   The patient was followed with serial low dose CT scans Lung cancer screening   8/9/22 CT scan low dose showed a LIRADS 4b lesion suspicious in the LLL GGO possibly neoplastic vs  inflammatory /infectious , and stable superior segment of LLL GGO, a 3 months follow up scan was recommended   1/14/23 CT low dose, interval increase in mixed solid and GGO in apical segment of the LLL measuring 2.3 x 1 cm increasing from 1.1 cm, interval development of a solid component measuring 0.7 cm, PET recommended, new 1 x 0.5 cm LLL opacity , additional  GGO   2/3/23 PET scan : GGO in the apical segment of LLL and CHELY are not FDG avid, low grade neoplastic process vs inflammatory   5/3/23 CT chest low dose: LIRADS 4X,S (very suspicious) lesion increased density of the apical segment LLL density, measured 2.6 cm from 2.3 cm and increased solid component measured 0.9 cm from 0.7 cm , CHELY GG density measuring up to 1.6 cm stable from  immediate prior but increased from previous could represent adenocarcinoma in situ, subsolid density measuring 1.1 cm in Right lung base, likely inflammatory   Referred to thoracic surgery   7/19/23: biopsy of LLL solid component of the density: invasive well differentiated adenoca, PDL1 < 1%, NGS showed KRAS G12C mutation   PFTs completed FEV1 81% and DLCO 44%, uses O2, deemed not a surgical candidate and referred to rad onc and med onc   Patient had CT scan in feb 2024 that showed around 5 cm opacity in the CHELY   This are of mass like consolidation had increased size and fDG avidity on PET scan of March 2024   She then had CT guided biopsy April 2024 that showed adenocarcinoma   Case discussed on TB with radiation oncology , recommended for another surveillance scan, the carcinoma area thought to be scant in the background of necrosis     Other Labs:   Chronic leukocytosis with lymphocytes predominance flow cytometry from 7/26 showed population CD5+CD23+ CD10- consistent with CLL, FISH, IGHV mutation status and NGS lymphoid not ordered   Wbc stable 20-30K , hg from 7/26 10.2 g/dl, ferritin of 27 and Tsat of 9%       PAST MEDICAL HISTORY:   CLL diagnosed since 2015 on  "surveillance and has not required treatment, smoking, COPD on oxygen, achalasia by esophagogram, Afib, HTN, obesity, CHF, depression, DM with neuropathy, GERD,  HL, IBS, OA, Rheumatoid/psoriatic arthritis on MTX      SOCIAL HISTORY:   quit smoking 7 years ago, smoked 1 ppd x 40 yrs   no alcohol   has one daughter and one granddaughter   she worked in a factory/ machine equipment      FAMILY HISTORY:    mother  of colon cancer, sister had thyroid cancer   No other specific history of bleeding, clotting or malignant disorder in the family.    Family History[1]  Oncology History   Adenocarcinoma of lung (Multi)   2023 Cancer Staged    Staging form: Lung, AJCC 8th Edition, Clinical stage from 2023: Stage IA3 (cT1c, cN0, cM0) - Signed by Bakari PARR MD on 2023 Initial Diagnosis    Adenocarcinoma, lung (CMS/HCC)         Alysia Magdaleno  reports that she quit smoking about 10 years ago. Her smoking use included cigarettes. She started smoking about 50 years ago. She has a 40 pack-year smoking history. She has been exposed to tobacco smoke. She has never used smokeless tobacco.  She  reports that she does not currently use alcohol.  She  reports current drug use. Drug: Marijuana.    Review of Systems - Oncology   Shortness of breath, some arthritis no hemoptysis no chest pain, other review of systems unremarkable  Objective   BSA: 1.87 meters squared  /50 (BP Location: Left arm, Patient Position: Sitting)   Pulse 61   Temp 36.5 °C (97.7 °F)   Resp 16   Ht 1.499 m (4' 11.02\")   Wt 84.1 kg (185 lb 8.3 oz)   SpO2 98%   BMI 37.45 kg/m²      Physical Exam  GENERAL:  Age-appropriate, in no acute discomfort.    VITAL SIGNS:  Reviewed in the EMR    HEENT:  Normocephalic and atraumatic.  Mucous membranes are moist. No oral lesions.    NECK:  Supple without lymphadenopathy.  No thyromegaly or bruits.    CHEST: bilaterally decreased breath sounds , area of tenderness on her right ribs "   HEART:  Regular bradycardic   ABDOMEN:  Soft, nontender, and nondistended. No hepatosplenomegaly.    NEUROLOGICAL:  Alert, awake, and oriented.   LYMPHATICS: No significant lymphadenopathy.  Extremities some redness of the right lower extremity due to insect bite  Performance Status: ECOG 2   Labs  15.9 High  P 15.7 High  13.7 High  19.9 High  20.0 High  17.0 High  23.2 High     nRBC P 0.0 R 0.0 R 0.0 R 0.0 R 0.0 R CM   Comment: Not Measured   RBC  4.00 - 5.20 x10*6/uL 3.15 Low  P 4.04 4.41 3.82 Low  3.48 Low  3.35 Low  3.61 Low    Hemoglobin  12.0 - 16.0 g/dL 10.5 Low  P 12.6 13.9 13.0 11.8 Low  11.4 Low  12.3   Hematocrit  36.0 - 46.0 % 34.4 Low  P 41.0 44.3 40.9 37.7 37.1 41.2   MCV  80 - 100 fL 109 High  P 102 High  101 High  107 High  108 High  111 High  114 High    MCH  26.0 - 34.0 pg 33.3 P 31.2 31.5 34.0 33.9 34.0 34.1 High    MCHC  32.0 - 36.0 g/dL 30.5 Low  P 30.7 Low  31.4 Low  31.8 Low  31.3 Low  30.7 Low  29.9 Low    RDW  11.5 - 14.5 % 15.1 High  P 13.8 13.6 12.7 12.7 13.8 14.4   Platelets  150 - 450 x10*3/uL 212 P         Radiology report, PET scan  Evolving postradiation changes in the superior segment of left  lower lobe, no PET evidence of local recurrence at the site of  radiation bed.  2. Stable non FDG avid right hilar and right upper lobe pulmonary  nodules.  3. No PET evidence of musa or distant metastasis  4. Innumerable non FDG avid subcentimeter lymph nodes above and below  diaphragm, consistent with history of CLL    Assessment/Plan      #1 stage IA [id number] left lower lobe adenocarcinoma s/p definitive SBRT 55 Gy/5 fractions completed 9/20/23.     #2 left upper lobe nodularity    3.  The right upper lobe nodule measuring 7 mm    4.  CLL diagnosed 2015 stable    Plan, clinically stable, CLL stable, PET scan result discussed with the patient.  I will continue to monitor clinically.  Patient will be reevaluated after 4 months.  Plan discussed with patient  Amanda Mayer,  MD                                [1]   Family History  Problem Relation Name Age of Onset    Cancer Mother      Drug abuse Mother      Mental illness Mother      No Known Problems Father      Cancer Sister

## 2025-08-05 ENCOUNTER — ANESTHESIA EVENT (OUTPATIENT)
Dept: OPERATING ROOM | Facility: HOSPITAL | Age: 75
End: 2025-08-05
Payer: MEDICARE

## 2025-08-06 ENCOUNTER — PRE-ADMISSION TESTING (OUTPATIENT)
Dept: PREADMISSION TESTING | Facility: HOSPITAL | Age: 75
End: 2025-08-06
Payer: MEDICARE

## 2025-08-06 VITALS
OXYGEN SATURATION: 96 % | HEIGHT: 59 IN | BODY MASS INDEX: 35.72 KG/M2 | DIASTOLIC BLOOD PRESSURE: 65 MMHG | TEMPERATURE: 98.2 F | RESPIRATION RATE: 16 BRPM | SYSTOLIC BLOOD PRESSURE: 140 MMHG | HEART RATE: 78 BPM | WEIGHT: 177.2 LBS

## 2025-08-06 DIAGNOSIS — E11.69 TYPE 2 DIABETES MELLITUS WITH OTHER SPECIFIED COMPLICATION, UNSPECIFIED WHETHER LONG TERM INSULIN USE (MULTI): ICD-10-CM

## 2025-08-06 DIAGNOSIS — Z86.0100 HISTORY OF COLONIC POLYPS: ICD-10-CM

## 2025-08-06 DIAGNOSIS — Z86.79 HISTORY OF ATRIAL FIBRILLATION: ICD-10-CM

## 2025-08-06 DIAGNOSIS — I10 PRIMARY HYPERTENSION: ICD-10-CM

## 2025-08-06 DIAGNOSIS — Z85.6 HISTORY OF CHRONIC LYMPHOCYTIC LEUKEMIA: ICD-10-CM

## 2025-08-06 DIAGNOSIS — Z01.818 PRE-OP EVALUATION: Primary | ICD-10-CM

## 2025-08-06 LAB
ANION GAP SERPL CALC-SCNC: 14 MMOL/L (ref 10–20)
BUN SERPL-MCNC: 21 MG/DL (ref 6–23)
CALCIUM SERPL-MCNC: 9.6 MG/DL (ref 8.6–10.3)
CHLORIDE SERPL-SCNC: 106 MMOL/L (ref 98–107)
CO2 SERPL-SCNC: 23 MMOL/L (ref 21–32)
CREAT SERPL-MCNC: 1.01 MG/DL (ref 0.5–1.05)
EGFRCR SERPLBLD CKD-EPI 2021: 59 ML/MIN/1.73M*2
ERYTHROCYTE [DISTWIDTH] IN BLOOD BY AUTOMATED COUNT: 13.5 % (ref 11.5–14.5)
GLUCOSE SERPL-MCNC: 106 MG/DL (ref 74–99)
HCT VFR BLD AUTO: 34.6 % (ref 36–46)
HGB BLD-MCNC: 10.9 G/DL (ref 12–16)
MCH RBC QN AUTO: 33.1 PG (ref 26–34)
MCHC RBC AUTO-ENTMCNC: 31.5 G/DL (ref 32–36)
MCV RBC AUTO: 105 FL (ref 80–100)
NRBC BLD-RTO: 0 /100 WBCS (ref 0–0)
PLATELET # BLD AUTO: 229 X10*3/UL (ref 150–450)
POTASSIUM SERPL-SCNC: 3.5 MMOL/L (ref 3.5–5.3)
RBC # BLD AUTO: 3.29 X10*6/UL (ref 4–5.2)
SODIUM SERPL-SCNC: 139 MMOL/L (ref 136–145)
WBC # BLD AUTO: 20.5 X10*3/UL (ref 4.4–11.3)

## 2025-08-06 PROCEDURE — 85027 COMPLETE CBC AUTOMATED: CPT

## 2025-08-06 PROCEDURE — 93010 ELECTROCARDIOGRAM REPORT: CPT | Performed by: INTERNAL MEDICINE

## 2025-08-06 PROCEDURE — 93005 ELECTROCARDIOGRAM TRACING: CPT

## 2025-08-06 PROCEDURE — 36415 COLL VENOUS BLD VENIPUNCTURE: CPT

## 2025-08-06 PROCEDURE — 80048 BASIC METABOLIC PNL TOTAL CA: CPT

## 2025-08-06 ASSESSMENT — LIFESTYLE VARIABLES: SMOKING_STATUS: NONSMOKER

## 2025-08-06 NOTE — PREPROCEDURE INSTRUCTIONS
Pre Surgical Instructions:    Tale metoprolol, clonidine , propafenone and trelegy morning of colonoscopy.  Hold eliquis 2 days, last dose 8/8/25    Do not drink any liquid after midnight the night before your surgery  Do not eat any solid food the day before your surgery/procedure.  Follow bowel prep instructions.  Candy, gum, mints and smoking of cigarettes, marijuana or vaping is not permitted after midnight prior to your surgery   Do not drink Alcohol 24 hours prior to surgery      Increase fluid intake day before surgery    Additional Instructions:      Review your medication instructions, take indicated medications    Wear  comfortable loose fitting clothing  Do not use moisturizers, creams, lotions or perfume  All jewelry and valuables should be left at home. May bring glasses and partials.    Stop blood thinning medications as instructed by ordering physician or surgeon    Shower or bathe the night before or day of surgery.   Brush teeth and avoid perfumes, colognes, powders, makeup, aftershave and hair spray    Go to Registration, in the main lobby, upon arrival on the day of surgery and have 's license and medical insurance card available.    Call [phone] the day before your surgery/procedure to find out what time you are to arrive the next day.     Please have a responsible adult to drive you home and be available to help you as needed after surgery.

## 2025-08-07 ENCOUNTER — APPOINTMENT (OUTPATIENT)
Dept: PRIMARY CARE | Facility: CLINIC | Age: 75
End: 2025-08-07
Payer: MEDICARE

## 2025-08-07 VITALS
HEART RATE: 84 BPM | RESPIRATION RATE: 16 BRPM | HEIGHT: 59 IN | DIASTOLIC BLOOD PRESSURE: 72 MMHG | BODY MASS INDEX: 35.28 KG/M2 | OXYGEN SATURATION: 98 % | SYSTOLIC BLOOD PRESSURE: 122 MMHG | WEIGHT: 175 LBS

## 2025-08-07 DIAGNOSIS — B37.2 CANDIDOSIS OF SKIN: ICD-10-CM

## 2025-08-07 DIAGNOSIS — C34.92 ADENOCARCINOMA OF LEFT LUNG (MULTI): ICD-10-CM

## 2025-08-07 DIAGNOSIS — E66.01 SEVERE OBESITY (MULTI): ICD-10-CM

## 2025-08-07 DIAGNOSIS — C91.10 CHRONIC LYMPHOCYTIC LEUKEMIA (MULTI): ICD-10-CM

## 2025-08-07 DIAGNOSIS — Z12.31 VISIT FOR SCREENING MAMMOGRAM: ICD-10-CM

## 2025-08-07 DIAGNOSIS — R73.9 HYPERGLYCEMIA: ICD-10-CM

## 2025-08-07 DIAGNOSIS — L40.50 PSORIASIS WITH ARTHROPATHY (MULTI): ICD-10-CM

## 2025-08-07 DIAGNOSIS — M05.9 SEROPOSITIVE RHEUMATOID ARTHRITIS: ICD-10-CM

## 2025-08-07 DIAGNOSIS — F32.1 CURRENT MODERATE EPISODE OF MAJOR DEPRESSIVE DISORDER WITHOUT PRIOR EPISODE (MULTI): ICD-10-CM

## 2025-08-07 DIAGNOSIS — J43.2 CENTRILOBULAR EMPHYSEMA (MULTI): ICD-10-CM

## 2025-08-07 DIAGNOSIS — E11.42 DIABETIC POLYNEUROPATHY ASSOCIATED WITH TYPE 2 DIABETES MELLITUS: ICD-10-CM
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
## 2025-08-07 DIAGNOSIS — Z78.0 MENOPAUSE: ICD-10-CM

## 2025-08-07 DIAGNOSIS — Z00.00 ROUTINE GENERAL MEDICAL EXAMINATION AT HEALTH CARE FACILITY: Primary | ICD-10-CM

## 2025-08-07 DIAGNOSIS — E78.2 MIXED HYPERLIPIDEMIA: ICD-10-CM

## 2025-08-07 DIAGNOSIS — L98.9 SKIN LESION OF LEFT LOWER EXTREMITY: ICD-10-CM

## 2025-08-07 DIAGNOSIS — I50.32 CHRONIC DIASTOLIC HEART FAILURE: ICD-10-CM

## 2025-08-07 DIAGNOSIS — I10 BENIGN ESSENTIAL HYPERTENSION: ICD-10-CM

## 2025-08-07 DIAGNOSIS — I48.0 PAROXYSMAL ATRIAL FIBRILLATION (MULTI): ICD-10-CM

## 2025-08-07 PROBLEM — I48.91 ATRIAL FIBRILLATION BY ELECTROCARDIOGRAM (MULTI): Status: RESOLVED | Noted: 2023-04-19 | Resolved: 2025-08-07

## 2025-08-07 PROBLEM — M25.559 HIP PAIN: Status: ACTIVE | Noted: 2025-08-07

## 2025-08-07 PROBLEM — I48.3 TYPICAL ATRIAL FLUTTER (MULTI): Status: RESOLVED | Noted: 2023-04-19 | Resolved: 2025-08-07

## 2025-08-07 PROBLEM — J43.9 EMPHYSEMA/COPD (MULTI): Status: RESOLVED | Noted: 2023-04-19 | Resolved: 2025-08-07

## 2025-08-07 PROBLEM — J44.9 CHRONIC OBSTRUCTIVE PULMONARY DISEASE (MULTI): Status: RESOLVED | Noted: 2022-08-01 | Resolved: 2025-08-07

## 2025-08-07 LAB
ATRIAL RATE: 73 BPM
P AXIS: -66 DEGREES
PR INTERVAL: 140 MS
Q ONSET: 252 MS
QRS COUNT: 12 BEATS
QRS DURATION: 92 MS
QT INTERVAL: 400 MS
QTC CALCULATION(BAZETT): 441 MS
QTC FREDERICIA: 427 MS
R AXIS: -32 DEGREES
T AXIS: 17 DEGREES
T OFFSET: 452 MS
VENTRICULAR RATE: 73 BPM

## 2025-08-07 PROCEDURE — 99214 OFFICE O/P EST MOD 30 MIN: CPT | Performed by: FAMILY MEDICINE

## 2025-08-07 PROCEDURE — 3008F BODY MASS INDEX DOCD: CPT | Performed by: FAMILY MEDICINE

## 2025-08-07 PROCEDURE — G0439 PPPS, SUBSEQ VISIT: HCPCS | Performed by: FAMILY MEDICINE

## 2025-08-07 PROCEDURE — 1170F FXNL STATUS ASSESSED: CPT | Performed by: FAMILY MEDICINE

## 2025-08-07 PROCEDURE — 1158F ADVNC CARE PLAN TLK DOCD: CPT | Performed by: FAMILY MEDICINE

## 2025-08-07 PROCEDURE — 1159F MED LIST DOCD IN RCRD: CPT | Performed by: FAMILY MEDICINE

## 2025-08-07 PROCEDURE — 3074F SYST BP LT 130 MM HG: CPT | Performed by: FAMILY MEDICINE

## 2025-08-07 PROCEDURE — 1123F ACP DISCUSS/DSCN MKR DOCD: CPT | Performed by: FAMILY MEDICINE

## 2025-08-07 PROCEDURE — 3078F DIAST BP <80 MM HG: CPT | Performed by: FAMILY MEDICINE

## 2025-08-07 RX ORDER — NYSTATIN 100000 U/G
CREAM TOPICAL 2 TIMES DAILY
Qty: 30 G | Refills: 1 | Status: SHIPPED | OUTPATIENT
Start: 2025-08-07 | End: 2025-08-14

## 2025-08-07 RX ORDER — TRIAMCINOLONE ACETONIDE 1 MG/G
OINTMENT TOPICAL 2 TIMES DAILY PRN
Qty: 60 G | Refills: 0 | Status: SHIPPED | OUTPATIENT
Start: 2025-08-07 | End: 2025-12-05

## 2025-08-07 RX ORDER — POLYETHYLENE GLYCOL-3350 AND ELECTROLYTES 236; 6.74; 5.86; 2.97; 22.74 G/274.31G; G/274.31G; G/274.31G; G/274.31G; G/274.31G
POWDER, FOR SOLUTION ORAL
COMMUNITY
Start: 2025-08-05

## 2025-08-07 ASSESSMENT — ACTIVITIES OF DAILY LIVING (ADL)
DRESSING: INDEPENDENT
TAKING_MEDICATION: INDEPENDENT
DOING_HOUSEWORK: INDEPENDENT
GROCERY_SHOPPING: INDEPENDENT
MANAGING_FINANCES: INDEPENDENT
BATHING: INDEPENDENT

## 2025-08-07 ASSESSMENT — ANXIETY QUESTIONNAIRES
5. BEING SO RESTLESS THAT IT IS HARD TO SIT STILL: NOT AT ALL
6. BECOMING EASILY ANNOYED OR IRRITABLE: NOT AT ALL
GAD7 TOTAL SCORE: 0
7. FEELING AFRAID AS IF SOMETHING AWFUL MIGHT HAPPEN: NOT AT ALL
1. FEELING NERVOUS, ANXIOUS, OR ON EDGE: NOT AT ALL
3. WORRYING TOO MUCH ABOUT DIFFERENT THINGS: NOT AT ALL
IF YOU CHECKED OFF ANY PROBLEMS ON THIS QUESTIONNAIRE, HOW DIFFICULT HAVE THESE PROBLEMS MADE IT FOR YOU TO DO YOUR WORK, TAKE CARE OF THINGS AT HOME, OR GET ALONG WITH OTHER PEOPLE: NOT DIFFICULT AT ALL
4. TROUBLE RELAXING: NOT AT ALL
2. NOT BEING ABLE TO STOP OR CONTROL WORRYING: NOT AT ALL

## 2025-08-07 ASSESSMENT — ENCOUNTER SYMPTOMS
UNEXPECTED WEIGHT CHANGE: 1
LOSS OF SENSATION IN FEET: 0
DEPRESSION: 0
OCCASIONAL FEELINGS OF UNSTEADINESS: 0

## 2025-08-07 NOTE — ASSESSMENT & PLAN NOTE
Stable and controlled, Rythmol  Orders:    Follow Up In [facility] - PCP - Medicare Annual; Future    CBC and Auto Differential; Future    Comprehensive Metabolic Panel; Future    Lipid Panel; Future    TSH with reflex to Free T4 if abnormal; Future

## 2025-08-07 NOTE — ASSESSMENT & PLAN NOTE
Stable and controlled, clonidine  Orders:    Follow Up In [facility] - PCP - Medicare Annual; Future    CBC and Auto Differential; Future    Comprehensive Metabolic Panel; Future    Lipid Panel; Future    TSH with reflex to Free T4 if abnormal; Future

## 2025-08-07 NOTE — ASSESSMENT & PLAN NOTE
Orders:  •  nystatin (Mycostatin) cream; Apply topically 2 times a day for 7 days. apply to affected area to buttock area

## 2025-08-07 NOTE — ASSESSMENT & PLAN NOTE
Stable and controlled atorvastatin 20 mg  Orders:    Follow Up In [facility] - PCP - Medicare Annual; Future    CBC and Auto Differential; Future    Comprehensive Metabolic Panel; Future    Lipid Panel; Future    TSH with reflex to Free T4 if abnormal; Future

## 2025-08-07 NOTE — ASSESSMENT & PLAN NOTE
Orders:  •  triamcinolone (Kenalog) 0.1 % ointment; Apply topically 2 times a day as needed for irritation or rash. On left leg

## 2025-08-07 NOTE — ASSESSMENT & PLAN NOTE
Stable and controlled.  Patient is on no medications and last hemoglobin A1c was over a year ago.  Reordered

## 2025-08-07 NOTE — ASSESSMENT & PLAN NOTE
Recheck hemoglobin A1c with most recent lab work  Orders:    Follow Up In [facility] - PCP - Medicare Annual; Future    CBC and Auto Differential; Future    Comprehensive Metabolic Panel; Future    Lipid Panel; Future    TSH with reflex to Free T4 if abnormal; Future    Hemoglobin A1C; Future

## 2025-08-07 NOTE — PATIENT INSTRUCTIONS
Adde cream for you leg(triamcinolone)   Added cream for rash on back side  (nystatin)   Ordered mammogram and bone scan.

## 2025-08-07 NOTE — PROGRESS NOTES
"Subjective   Reason for Visit: Alysia Magdaleno is an 75 y.o. female here for a Medicare Wellness visit.     Past Medical, Surgical, and Family History reviewed and updated in chart.    Reviewed all medications by prescribing practitioner or clinical pharmacist (such as prescriptions, OTCs, herbal therapies and supplements) and documented in the medical record.    HPI  Last seen one year ago, and she has lost 20 pounds. She is working a lot over the summer on cleaning her house. She [facility] at [facility]. She is ot have colonoscopy next week. She current was told she has no cancer. Her leukemia is stable. Had Stage 1A3 LLL adenocarcinoma, CLL both are in remission. CMP and CBC look good. She IS TRYING TO BE MORE ACTIVE, Has RASH LLE. Stud in mouth fell out. Has lesions on leg that she would like to have removed.   Patient Care Team:  Xi Dwyer MD as PCP - General (Family Medicine)  Darrell Mendez MD as Medical Oncologist (Hematology and Oncology)  Amanda Mayer MD as Consulting Physician (Hematology and Oncology)   Boston Esteves  Rhematologist -rheumatoid and psoriatic. Sulfasalazine, methotrexate.   Cardiology- [facility]  Pain medicine  Gastroenterology- [facility], to have colonoscopy tomorrwo.         Review of Systems   Constitutional:  Positive for unexpected weight change (weight loss of twenty pounds.).       Objective   Vitals:  /72   Pulse 84   Resp 16   Ht (!) 1.499 m (4' 11\")   Wt 79.4 kg (175 lb)   SpO2 98%   BMI 35.35 kg/m²       Physical Exam  Vitals reviewed.   Constitutional:       Appearance: Normal appearance.   HENT:      Head: Normocephalic and atraumatic.      Right Ear: Tympanic membrane normal.      Left Ear: Tympanic membrane normal.      Nose: Nose normal.      Mouth/Throat:      Mouth: Mucous membranes are moist.      Pharynx: Oropharynx is clear.     Eyes:      Extraocular Movements: Extraocular movements intact.      Conjunctiva/sclera: Conjunctivae normal.      Pupils: " Pupils are equal, round, and reactive to light.       Cardiovascular:      Rate and Rhythm: Normal rate and regular rhythm.      Pulses: Normal pulses.      Heart sounds: Normal heart sounds.   Pulmonary:      Effort: Pulmonary effort is normal.      Breath sounds: Normal breath sounds.   Abdominal:      General: Abdomen is flat. Bowel sounds are normal.      Palpations: Abdomen is soft.     Musculoskeletal:         General: Normal range of motion.      Cervical back: Normal range of motion and neck supple.     Skin:     General: Skin is warm and dry.      Capillary Refill: Capillary refill takes less than 2 seconds.     Neurological:      General: No focal deficit present.      Mental Status: She is alert and oriented to person, place, and time.     Psychiatric:         Mood and Affect: Mood normal.         Behavior: Behavior normal.         Assessment & Plan  Routine general medical examination at health care facility    Orders:    1 Year Follow Up In [facility] - PCP - Wellness Exam; Future    Skin lesion of left lower extremity  From multiple bug bites. Apply steroid cream to area       Psoriasis with arthropathy (Multi)    Orders:    Referral to Rheumatology; Future    Referral to Dermatology    Seropositive rheumatoid arthritis    Orders:    Referral to Rheumatology; Future    Candidosis of skin    Orders:    nystatin (Mycostatin) cream; Apply topically 2 times a day for 7 days. apply to affected area to buttock area    Bug bite without infection, initial encounter    Orders:    triamcinolone (Kenalog) 0.1 % ointment; Apply topically 2 times a day as needed for irritation or rash. On left leg    Body mass index [BMI] 35.0-35.9, adult (Z68.35)         Chronic diastolic heart failure  Stable and controlled, Rythmol  Orders:    Follow Up In [facility] - PCP - Medicare Annual; Future    CBC and Auto Differential; Future    Comprehensive Metabolic Panel; Future    Lipid Panel; Future    TSH  with reflex to Free T4 if abnormal; Future    Benign essential hypertension  Stable and controlled, clonidine  Orders:    Follow Up In [facility] - PCP - Medicare Annual; Future    CBC and Auto Differential; Future    Comprehensive Metabolic Panel; Future    Lipid Panel; Future    TSH with reflex to Free T4 if abnormal; Future    Hyperglycemia  Recheck hemoglobin A1c with most recent lab work  Orders:    Follow Up In [facility] - PCP - Medicare Annual; Future    CBC and Auto Differential; Future    Comprehensive Metabolic Panel; Future    Lipid Panel; Future    TSH with reflex to Free T4 if abnormal; Future    Hemoglobin A1C; Future    Mixed hyperlipidemia  Stable and controlled atorvastatin 20 mg  Orders:    Follow Up In [facility] - PCP - Medicare Annual; Future    CBC and Auto Differential; Future    Comprehensive Metabolic Panel; Future    Lipid Panel; Future    TSH with reflex to Free T4 if abnormal; Future    Menopause    Orders:    XR DEXA bone density; Future    BI mammo bilateral screening tomosynthesis; Future    Visit for screening mammogram    Orders:    XR DEXA bone density; Future    BI mammo bilateral screening tomosynthesis; Future    Centrilobular emphysema (Multi)  Stable and controlled, Trelegy albuterol Daliresp       Paroxysmal atrial fibrillation (Multi)  Stable and controlled, patient is in regular rhythm today.  Continues apixaban       Current moderate episode of major depressive disorder without prior episode (Multi)  Duloxetine.  Stable and controlled       Diabetic polyneuropathy associated with type 2 diabetes mellitus  Stable and controlled.  Patient is on no medications and last hemoglobin A1c was over a year ago.  Reordered       Chronic lymphocytic leukemia (Multi)  Last counts were stable and chronic lymphocytic leukemia is also in remission       Adenocarcinoma of left lung (Multi)  Currently in remission.  Sees oncology.  Had PET scan this year  which was negative       Overall with the amount of adversity Alysia has based in the last year she is doing great.  Referrals were made to dermatology for lesions on her legs which are bothering her and to rheumatology to reestablish as she is on sulfasalazine and methotrexate for psoriatic and rheumatoid arthritis.

## 2025-08-11 ENCOUNTER — TELEPHONE (OUTPATIENT)
Dept: PRIMARY CARE | Facility: CLINIC | Age: 75
End: 2025-08-11

## 2025-08-11 ENCOUNTER — ANESTHESIA (OUTPATIENT)
Dept: OPERATING ROOM | Facility: HOSPITAL | Age: 75
End: 2025-08-11
Payer: MEDICARE

## 2025-08-11 ENCOUNTER — HOSPITAL ENCOUNTER (OUTPATIENT)
Dept: OPERATING ROOM | Facility: HOSPITAL | Age: 75
Discharge: HOME | End: 2025-08-11
Payer: MEDICARE

## 2025-08-11 VITALS
BODY MASS INDEX: 35.28 KG/M2 | SYSTOLIC BLOOD PRESSURE: 154 MMHG | HEIGHT: 59 IN | RESPIRATION RATE: 18 BRPM | WEIGHT: 175 LBS | HEART RATE: 82 BPM | OXYGEN SATURATION: 97 % | TEMPERATURE: 98.7 F | DIASTOLIC BLOOD PRESSURE: 74 MMHG

## 2025-08-11 DIAGNOSIS — D12.3 ADENOMATOUS POLYP OF TRANSVERSE COLON: ICD-10-CM

## 2025-08-11 LAB — GLUCOSE BLD MANUAL STRIP-MCNC: 92 MG/DL (ref 74–99)

## 2025-08-11 PROCEDURE — 3600000002 HC OR TIME - INITIAL BASE CHARGE - PROCEDURE LEVEL TWO

## 2025-08-11 PROCEDURE — 3700000002 HC GENERAL ANESTHESIA TIME - EACH INCREMENTAL 1 MINUTE

## 2025-08-11 PROCEDURE — 82947 ASSAY GLUCOSE BLOOD QUANT: CPT

## 2025-08-11 PROCEDURE — 2500000004 HC RX 250 GENERAL PHARMACY W/ HCPCS (ALT 636 FOR OP/ED): Performed by: ANESTHESIOLOGY

## 2025-08-11 PROCEDURE — 2500000004 HC RX 250 GENERAL PHARMACY W/ HCPCS (ALT 636 FOR OP/ED)

## 2025-08-11 PROCEDURE — 3600000007 HC OR TIME - EACH INCREMENTAL 1 MINUTE - PROCEDURE LEVEL TWO

## 2025-08-11 PROCEDURE — 3700000001 HC GENERAL ANESTHESIA TIME - INITIAL BASE CHARGE

## 2025-08-11 PROCEDURE — 45385 COLONOSCOPY W/LESION REMOVAL: CPT | Performed by: INTERNAL MEDICINE

## 2025-08-11 PROCEDURE — 7100000009 HC PHASE TWO TIME - INITIAL BASE CHARGE

## 2025-08-11 PROCEDURE — 7100000010 HC PHASE TWO TIME - EACH INCREMENTAL 1 MINUTE

## 2025-08-11 RX ORDER — LIDOCAINE HYDROCHLORIDE 10 MG/ML
0.1 INJECTION, SOLUTION EPIDURAL; INFILTRATION; INTRACAUDAL; PERINEURAL ONCE
Status: DISCONTINUED | OUTPATIENT
Start: 2025-08-11 | End: 2025-08-12 | Stop reason: HOSPADM

## 2025-08-11 RX ORDER — FAMOTIDINE 10 MG/ML
20 INJECTION, SOLUTION INTRAVENOUS ONCE
Status: COMPLETED | OUTPATIENT
Start: 2025-08-11 | End: 2025-08-11

## 2025-08-11 RX ORDER — LIDOCAINE HYDROCHLORIDE 20 MG/ML
INJECTION, SOLUTION EPIDURAL; INFILTRATION; INTRACAUDAL; PERINEURAL AS NEEDED
Status: DISCONTINUED | OUTPATIENT
Start: 2025-08-11 | End: 2025-08-11

## 2025-08-11 RX ORDER — SODIUM CHLORIDE, SODIUM LACTATE, POTASSIUM CHLORIDE, CALCIUM CHLORIDE 600; 310; 30; 20 MG/100ML; MG/100ML; MG/100ML; MG/100ML
100 INJECTION, SOLUTION INTRAVENOUS CONTINUOUS
Status: ACTIVE | OUTPATIENT
Start: 2025-08-11 | End: 2025-08-11

## 2025-08-11 RX ORDER — SODIUM CHLORIDE, SODIUM LACTATE, POTASSIUM CHLORIDE, CALCIUM CHLORIDE 600; 310; 30; 20 MG/100ML; MG/100ML; MG/100ML; MG/100ML
75 INJECTION, SOLUTION INTRAVENOUS CONTINUOUS
Status: ACTIVE | OUTPATIENT
Start: 2025-08-11 | End: 2025-08-11

## 2025-08-11 RX ORDER — PROPOFOL 10 MG/ML
INJECTION, EMULSION INTRAVENOUS AS NEEDED
Status: DISCONTINUED | OUTPATIENT
Start: 2025-08-11 | End: 2025-08-11

## 2025-08-11 RX ORDER — ONDANSETRON HYDROCHLORIDE 2 MG/ML
4 INJECTION, SOLUTION INTRAVENOUS ONCE AS NEEDED
Status: DISCONTINUED | OUTPATIENT
Start: 2025-08-11 | End: 2025-08-12 | Stop reason: HOSPADM

## 2025-08-11 RX ADMIN — PROPOFOL 350 MG: 10 INJECTION, EMULSION INTRAVENOUS at 09:57

## 2025-08-11 RX ADMIN — SODIUM CHLORIDE, POTASSIUM CHLORIDE, SODIUM LACTATE AND CALCIUM CHLORIDE: 600; 310; 30; 20 INJECTION, SOLUTION INTRAVENOUS at 09:53

## 2025-08-11 RX ADMIN — LIDOCAINE HYDROCHLORIDE 60 MG: 20 INJECTION, SOLUTION EPIDURAL; INFILTRATION; INTRACAUDAL; PERINEURAL at 09:57

## 2025-08-11 RX ADMIN — FAMOTIDINE 20 MG: 10 INJECTION, SOLUTION INTRAVENOUS at 09:30

## 2025-08-11 SDOH — HEALTH STABILITY: MENTAL HEALTH: CURRENT SMOKER: 0

## 2025-08-11 ASSESSMENT — PAIN SCALES - GENERAL
PAINLEVEL_OUTOF10: 0 - NO PAIN
PAINLEVEL_OUTOF10: 0 - NO PAIN
PAIN_LEVEL: 0
PAINLEVEL_OUTOF10: 0 - NO PAIN

## 2025-08-11 ASSESSMENT — PAIN - FUNCTIONAL ASSESSMENT
PAIN_FUNCTIONAL_ASSESSMENT: 0-10

## 2025-08-15 LAB
LABORATORY COMMENT REPORT: NORMAL
PATH REPORT.FINAL DX SPEC: NORMAL
PATH REPORT.GROSS SPEC: NORMAL
PATH REPORT.RELEVANT HX SPEC: NORMAL
PATH REPORT.TOTAL CANCER: NORMAL

## 2025-08-18 ENCOUNTER — TELEPHONE (OUTPATIENT)
Dept: PRIMARY CARE | Facility: CLINIC | Age: 75
End: 2025-08-18
Payer: MEDICARE

## 2025-08-18 DIAGNOSIS — M05.79 RHEUMATOID ARTHRITIS INVOLVING MULTIPLE SITES WITH POSITIVE RHEUMATOID FACTOR (MULTI): ICD-10-CM

## 2025-08-19 RX ORDER — FOLIC ACID 1 MG/1
1 TABLET ORAL DAILY
Qty: 90 TABLET | Refills: 0 | Status: SHIPPED | OUTPATIENT
Start: 2025-08-19 | End: 2025-08-22 | Stop reason: SDUPTHER

## 2025-08-19 RX ORDER — SULFASALAZINE 500 MG/1
500 TABLET ORAL 2 TIMES DAILY
Qty: 180 TABLET | Refills: 0 | Status: SHIPPED | OUTPATIENT
Start: 2025-08-19

## 2025-08-22 DIAGNOSIS — M05.79 RHEUMATOID ARTHRITIS INVOLVING MULTIPLE SITES WITH POSITIVE RHEUMATOID FACTOR (MULTI): ICD-10-CM

## 2025-08-24 RX ORDER — FOLIC ACID 1 MG/1
1 TABLET ORAL DAILY
Qty: 90 TABLET | Refills: 3 | Status: SHIPPED | OUTPATIENT
Start: 2025-08-24 | End: 2026-08-19

## 2025-08-26 ENCOUNTER — APPOINTMENT (OUTPATIENT)
Dept: RADIOLOGY | Facility: CLINIC | Age: 75
End: 2025-08-26
Payer: MEDICARE

## 2025-08-28 ENCOUNTER — APPOINTMENT (OUTPATIENT)
Dept: CARDIOLOGY | Facility: HOSPITAL | Age: 75
End: 2025-08-28
Payer: MEDICARE

## 2025-09-22 ENCOUNTER — APPOINTMENT (OUTPATIENT)
Dept: CARDIOLOGY | Facility: HOSPITAL | Age: 75
End: 2025-09-22
Payer: MEDICARE

## 2026-08-19 ENCOUNTER — APPOINTMENT (OUTPATIENT)
Dept: PRIMARY CARE | Facility: CLINIC | Age: 76
End: 2026-08-19
Payer: MEDICARE